# Patient Record
Sex: MALE | Race: WHITE | NOT HISPANIC OR LATINO | URBAN - METROPOLITAN AREA
[De-identification: names, ages, dates, MRNs, and addresses within clinical notes are randomized per-mention and may not be internally consistent; named-entity substitution may affect disease eponyms.]

---

## 2018-01-17 PROBLEM — Z00.00 ENCOUNTER FOR PREVENTIVE HEALTH EXAMINATION: Status: ACTIVE | Noted: 2018-01-17

## 2018-09-20 VITALS
OXYGEN SATURATION: 95 % | WEIGHT: 199.96 LBS | TEMPERATURE: 97 F | HEIGHT: 66 IN | DIASTOLIC BLOOD PRESSURE: 67 MMHG | HEART RATE: 61 BPM | SYSTOLIC BLOOD PRESSURE: 113 MMHG | RESPIRATION RATE: 18 BRPM

## 2018-09-20 NOTE — PATIENT PROFILE ADULT. - PMH
CAD (coronary artery disease)    Chronic bilateral low back pain with bilateral sciatica    Claudication    HLD (hyperlipidemia)    HTN (hypertension) CAD (coronary artery disease)  coronory stents x 2  Chronic bilateral low back pain with bilateral sciatica    Claudication    HLD (hyperlipidemia)    HTN (hypertension)

## 2018-09-20 NOTE — PATIENT PROFILE ADULT. - PSH
History of partial thyroidectomy    History of percutaneous transluminal coronary angioplasty History of appendectomy    History of partial thyroidectomy    History of percutaneous transluminal coronary angioplasty

## 2018-09-21 ENCOUNTER — INPATIENT (INPATIENT)
Facility: HOSPITAL | Age: 83
LOS: 2 days | Discharge: HOME HEALTH | DRG: 460 | End: 2018-09-24
Attending: NEUROLOGICAL SURGERY | Admitting: NEUROLOGICAL SURGERY
Payer: MEDICARE

## 2018-09-21 DIAGNOSIS — Z90.49 ACQUIRED ABSENCE OF OTHER SPECIFIED PARTS OF DIGESTIVE TRACT: Chronic | ICD-10-CM

## 2018-09-21 DIAGNOSIS — Z98.890 OTHER SPECIFIED POSTPROCEDURAL STATES: Chronic | ICD-10-CM

## 2018-09-21 DIAGNOSIS — I10 ESSENTIAL (PRIMARY) HYPERTENSION: ICD-10-CM

## 2018-09-21 DIAGNOSIS — E78.00 PURE HYPERCHOLESTEROLEMIA, UNSPECIFIED: ICD-10-CM

## 2018-09-21 DIAGNOSIS — M48.062 SPINAL STENOSIS, LUMBAR REGION WITH NEUROGENIC CLAUDICATION: ICD-10-CM

## 2018-09-21 DIAGNOSIS — Z98.61 CORONARY ANGIOPLASTY STATUS: Chronic | ICD-10-CM

## 2018-09-21 DIAGNOSIS — I25.10 ATHEROSCLEROTIC HEART DISEASE OF NATIVE CORONARY ARTERY WITHOUT ANGINA PECTORIS: ICD-10-CM

## 2018-09-21 RX ORDER — HYDROMORPHONE HYDROCHLORIDE 2 MG/ML
0.3 INJECTION INTRAMUSCULAR; INTRAVENOUS; SUBCUTANEOUS
Qty: 0 | Refills: 0 | Status: DISCONTINUED | OUTPATIENT
Start: 2018-09-21 | End: 2018-09-24

## 2018-09-21 RX ORDER — CYCLOBENZAPRINE HYDROCHLORIDE 10 MG/1
1 TABLET, FILM COATED ORAL
Qty: 30 | Refills: 0 | OUTPATIENT
Start: 2018-09-21

## 2018-09-21 RX ORDER — ACETAMINOPHEN 500 MG
975 TABLET ORAL EVERY 6 HOURS
Qty: 0 | Refills: 0 | Status: DISCONTINUED | OUTPATIENT
Start: 2018-09-21 | End: 2018-09-24

## 2018-09-21 RX ORDER — CYCLOBENZAPRINE HYDROCHLORIDE 10 MG/1
10 TABLET, FILM COATED ORAL THREE TIMES A DAY
Qty: 0 | Refills: 0 | Status: DISCONTINUED | OUTPATIENT
Start: 2018-09-21 | End: 2018-09-24

## 2018-09-21 RX ORDER — OXYCODONE HYDROCHLORIDE 5 MG/1
10 TABLET ORAL EVERY 4 HOURS
Qty: 0 | Refills: 0 | Status: DISCONTINUED | OUTPATIENT
Start: 2018-09-21 | End: 2018-09-24

## 2018-09-21 RX ORDER — OXYCODONE HYDROCHLORIDE 5 MG/1
5 TABLET ORAL EVERY 4 HOURS
Qty: 0 | Refills: 0 | Status: DISCONTINUED | OUTPATIENT
Start: 2018-09-21 | End: 2018-09-24

## 2018-09-21 RX ORDER — HYDROMORPHONE HYDROCHLORIDE 2 MG/ML
0.5 INJECTION INTRAMUSCULAR; INTRAVENOUS; SUBCUTANEOUS EVERY 4 HOURS
Qty: 0 | Refills: 0 | Status: DISCONTINUED | OUTPATIENT
Start: 2018-09-21 | End: 2018-09-24

## 2018-09-21 RX ORDER — SENNA PLUS 8.6 MG/1
2 TABLET ORAL AT BEDTIME
Qty: 0 | Refills: 0 | Status: DISCONTINUED | OUTPATIENT
Start: 2018-09-21 | End: 2018-09-24

## 2018-09-21 RX ORDER — SODIUM CHLORIDE 9 MG/ML
1000 INJECTION INTRAMUSCULAR; INTRAVENOUS; SUBCUTANEOUS
Qty: 0 | Refills: 0 | Status: DISCONTINUED | OUTPATIENT
Start: 2018-09-21 | End: 2018-09-21

## 2018-09-21 RX ORDER — ONDANSETRON 8 MG/1
4 TABLET, FILM COATED ORAL EVERY 6 HOURS
Qty: 0 | Refills: 0 | Status: DISCONTINUED | OUTPATIENT
Start: 2018-09-21 | End: 2018-09-24

## 2018-09-21 RX ORDER — DOCUSATE SODIUM 100 MG
1 CAPSULE ORAL
Qty: 40 | Refills: 0 | OUTPATIENT
Start: 2018-09-21

## 2018-09-21 RX ORDER — FAMOTIDINE 10 MG/ML
20 INJECTION INTRAVENOUS DAILY
Qty: 0 | Refills: 0 | Status: DISCONTINUED | OUTPATIENT
Start: 2018-09-21 | End: 2018-09-24

## 2018-09-21 RX ORDER — FAMOTIDINE 10 MG/ML
20 INJECTION INTRAVENOUS
Qty: 0 | Refills: 0 | Status: DISCONTINUED | OUTPATIENT
Start: 2018-09-21 | End: 2018-09-21

## 2018-09-21 RX ORDER — INFLUENZA VIRUS VACCINE 15; 15; 15; 15 UG/.5ML; UG/.5ML; UG/.5ML; UG/.5ML
0.5 SUSPENSION INTRAMUSCULAR ONCE
Qty: 0 | Refills: 0 | Status: COMPLETED | OUTPATIENT
Start: 2018-09-21 | End: 2018-09-24

## 2018-09-21 RX ORDER — CEFAZOLIN SODIUM 1 G
1000 VIAL (EA) INJECTION EVERY 8 HOURS
Qty: 0 | Refills: 0 | Status: COMPLETED | OUTPATIENT
Start: 2018-09-21 | End: 2018-09-22

## 2018-09-21 RX ORDER — LOSARTAN POTASSIUM 100 MG/1
50 TABLET, FILM COATED ORAL DAILY
Qty: 0 | Refills: 0 | Status: DISCONTINUED | OUTPATIENT
Start: 2018-09-21 | End: 2018-09-21

## 2018-09-21 RX ADMIN — Medication 100 MILLIGRAM(S): at 15:40

## 2018-09-21 RX ADMIN — HYDROMORPHONE HYDROCHLORIDE 0.3 MILLIGRAM(S): 2 INJECTION INTRAMUSCULAR; INTRAVENOUS; SUBCUTANEOUS at 13:09

## 2018-09-21 RX ADMIN — Medication 975 MILLIGRAM(S): at 22:12

## 2018-09-21 RX ADMIN — Medication 975 MILLIGRAM(S): at 17:52

## 2018-09-21 RX ADMIN — Medication 975 MILLIGRAM(S): at 21:08

## 2018-09-21 RX ADMIN — Medication 975 MILLIGRAM(S): at 15:44

## 2018-09-21 RX ADMIN — Medication 100 MILLIGRAM(S): at 21:18

## 2018-09-21 RX ADMIN — HYDROMORPHONE HYDROCHLORIDE 0.3 MILLIGRAM(S): 2 INJECTION INTRAMUSCULAR; INTRAVENOUS; SUBCUTANEOUS at 13:31

## 2018-09-21 RX ADMIN — SODIUM CHLORIDE 75 MILLILITER(S): 9 INJECTION INTRAMUSCULAR; INTRAVENOUS; SUBCUTANEOUS at 16:18

## 2018-09-21 NOTE — H&P ADULT - PMH
CAD (coronary artery disease)  coronory stents x 2  Chronic bilateral low back pain with bilateral sciatica    Claudication    HLD (hyperlipidemia)    HTN (hypertension)

## 2018-09-21 NOTE — BRIEF OPERATIVE NOTE - PRE-OP DX
Lumbar spondylosis  09/21/2018    Active  Lloyd Miranda  Lumbar stenosis with neurogenic claudication  09/21/2018  Right L2-3, L3-4  Active  Lloyd Miranda

## 2018-09-21 NOTE — H&P ADULT - HISTORY OF PRESENT ILLNESS
This is a 84 y/o male with PMHx of CAD ( on ASA)  HLD and HTN who presents with progressive LBP and radiculopathy.   The symptoms started suddenly without history of trauma. There is subjective________  He has failed conservative measures and his daily life has been affected due to the symptoms.   He denies urinary or bowel incontinence. Imaging revealed ______ and after discussion presents for surgical intervention. This is a LEFT HANDED/ FOOTED 82 y/o male with PMHx of CAD ( on ASA)  HLD and HTN who presents with progressive LBP and RLE radiculopathy.   The symptoms started suddenly without history of trauma. There is subjective weakness of his right foot and the p-ain goes from the back down the buttock to the posterior thigh ending at the knee. Though he does not need assistive ambulation devices these symptoms are exacerbated with walking more than a block and limit the distance he can do.   He has failed conservative measures and his daily life has been affected due to the symptoms.   He denies urinary or bowel incontinence. Imaging revealed neuroforaminal stenosis and HNP  and after discussion presents for surgical intervention.   (+) ASA use and stopped 1 week ago.

## 2018-09-21 NOTE — PROGRESS NOTE ADULT - SUBJECTIVE AND OBJECTIVE BOX
NEUROSURGERY POST OP NOTE:    POD# 0 S/P right L2-4 lami    S: Seen/evaluated at bedside.  Doing well, pain controlled.  No acute events.  No other complaints      T(C): 35.9 (09-21-18 @ 10:23), Max: 35.9 (09-21-18 @ 10:23)  HR: 76 (09-21-18 @ 12:30) (72 - 76)  BP: 115/63 (09-21-18 @ 12:30) (111/80 - 124/69)  RR: 19 (09-21-18 @ 12:30) (11 - 23)  SpO2: 95% (09-21-18 @ 12:30) (94% - 100%)      09-21-18 @ 07:01  -  09-21-18 @ 13:42  --------------------------------------------------------  IN: 250 mL / OUT: 150 mL / NET: 100 mL        acetaminophen   Tablet .. 975 milliGRAM(s) Oral every 6 hours  ceFAZolin   IVPB 1000 milliGRAM(s) IV Intermittent every 8 hours  cyclobenzaprine 10 milliGRAM(s) Oral three times a day PRN  famotidine    Tablet 20 milliGRAM(s) Oral daily  HYDROmorphone  Injectable 0.3 milliGRAM(s) IV Push every 30 minutes PRN  HYDROmorphone  Injectable 0.5 milliGRAM(s) IV Push every 4 hours PRN  influenza   Vaccine 0.5 milliLiter(s) IntraMuscular once  ondansetron Injectable 4 milliGRAM(s) IV Push every 6 hours PRN  oxyCODONE    IR 5 milliGRAM(s) Oral every 4 hours PRN  oxyCODONE    IR 10 milliGRAM(s) Oral every 4 hours PRN  senna 2 Tablet(s) Oral at bedtime  sodium chloride 0.9%. 1000 milliLiter(s) IV Continuous <Continuous>      Exam: A/Ox3, follows commands, speech clear  RODRIGUEZ 5/5 strength  Sensation intact light touch all dermatomes    WOUND/DRAINS: abdominal binder in place, +J      Assessment: 83yMale s/p right L2-4 lami      Plan:  -Pain control  -PT/OOB  -Advance diet  -D/C anita in AM  -DVT/GI ppx  -D/W Dr. schmitt NEUROSURGERY POST OP NOTE:    POD# 0 S/P right L2-4 lami    S: Seen/evaluated at bedside.  Doing well, pain controlled.  No acute events.  No other complaints      T(C): 35.9 (09-21-18 @ 10:23), Max: 35.9 (09-21-18 @ 10:23)  HR: 76 (09-21-18 @ 12:30) (72 - 76)  BP: 115/63 (09-21-18 @ 12:30) (111/80 - 124/69)  RR: 19 (09-21-18 @ 12:30) (11 - 23)  SpO2: 95% (09-21-18 @ 12:30) (94% - 100%)      09-21-18 @ 07:01  -  09-21-18 @ 13:42  --------------------------------------------------------  IN: 250 mL / OUT: 150 mL / NET: 100 mL        acetaminophen   Tablet .. 975 milliGRAM(s) Oral every 6 hours  ceFAZolin   IVPB 1000 milliGRAM(s) IV Intermittent every 8 hours  cyclobenzaprine 10 milliGRAM(s) Oral three times a day PRN  famotidine    Tablet 20 milliGRAM(s) Oral daily  HYDROmorphone  Injectable 0.3 milliGRAM(s) IV Push every 30 minutes PRN  HYDROmorphone  Injectable 0.5 milliGRAM(s) IV Push every 4 hours PRN  influenza   Vaccine 0.5 milliLiter(s) IntraMuscular once  ondansetron Injectable 4 milliGRAM(s) IV Push every 6 hours PRN  oxyCODONE    IR 5 milliGRAM(s) Oral every 4 hours PRN  oxyCODONE    IR 10 milliGRAM(s) Oral every 4 hours PRN  senna 2 Tablet(s) Oral at bedtime  sodium chloride 0.9%. 1000 milliLiter(s) IV Continuous <Continuous>      Exam: A/Ox3, follows commands, speech clear  RLE 4/5 strength, LLE 5/5 strength  Sensation intact light touch all dermatomes    WOUND/DRAINS: abdominal binder in place, +J      Assessment: 83yMale s/p right L2-4 lami      Plan:  -Pain control  -PT/OOB  -Advance diet  -D/C anita in AM  -DVT/GI ppx  -D/W Dr. schmitt NEUROSURGERY POST OP NOTE:    POD# 0 S/P right L2-4 lami    S: Seen/evaluated at bedside.  Doing well, pain controlled.  No acute events.  No other complaints      T(C): 35.9 (09-21-18 @ 10:23), Max: 35.9 (09-21-18 @ 10:23)  HR: 76 (09-21-18 @ 12:30) (72 - 76)  BP: 115/63 (09-21-18 @ 12:30) (111/80 - 124/69)  RR: 19 (09-21-18 @ 12:30) (11 - 23)  SpO2: 95% (09-21-18 @ 12:30) (94% - 100%)      09-21-18 @ 07:01  -  09-21-18 @ 13:42  --------------------------------------------------------  IN: 250 mL / OUT: 150 mL / NET: 100 mL        acetaminophen   Tablet .. 975 milliGRAM(s) Oral every 6 hours  ceFAZolin   IVPB 1000 milliGRAM(s) IV Intermittent every 8 hours  cyclobenzaprine 10 milliGRAM(s) Oral three times a day PRN  famotidine    Tablet 20 milliGRAM(s) Oral daily  HYDROmorphone  Injectable 0.3 milliGRAM(s) IV Push every 30 minutes PRN  HYDROmorphone  Injectable 0.5 milliGRAM(s) IV Push every 4 hours PRN  influenza   Vaccine 0.5 milliLiter(s) IntraMuscular once  ondansetron Injectable 4 milliGRAM(s) IV Push every 6 hours PRN  oxyCODONE    IR 5 milliGRAM(s) Oral every 4 hours PRN  oxyCODONE    IR 10 milliGRAM(s) Oral every 4 hours PRN  senna 2 Tablet(s) Oral at bedtime  sodium chloride 0.9%. 1000 milliLiter(s) IV Continuous <Continuous>      Exam: A/Ox3, follows commands, speech clear  RLE 4/5 strength, LLE 5/5 strength  Sensation intact light touch all dermatomes    WOUND/DRAINS: abdominal binder in place, +KEITH, dressing c/d/i       Assessment: 83y Male s/p right L2-4 lami POD#0      Plan:  -Pain control  -PT/OOB  -Advance diet  -D/C anita in AM  -DVT/GI ppx  -D/W Dr. schmitt

## 2018-09-21 NOTE — H&P ADULT - NSHPPHYSICALEXAM_GEN_ALL_CORE
A&Ox3 sitting in chair  speech clear   CN intact   RODRIGUEZ   LE Motor : right quad , HS and IS 4/5, rest is 5/5 throughout   sensation intact to LT BL   positive SLR on right

## 2018-09-21 NOTE — BRIEF OPERATIVE NOTE - PROCEDURE
<<-----Click on this checkbox to enter Procedure Lumbar laminectomy at multiple levels  09/21/2018  Right L2-3, L3-4,  Active  [email or web address]

## 2018-09-21 NOTE — H&P ADULT - ASSESSMENT
Lumbar HNP Lumbar HNP causing stenosis   Images reviewed with patient.   explained risks , benefits and alternatives to procedure.   Risks such as infection, CSF leak, hemorrhage, weakness, sensory changes, failure of surgery and/or need for further surgery explained.   Patient expressed understanding and wishes to proceed with procedure.

## 2018-09-21 NOTE — H&P ADULT - PSH
History of appendectomy    History of partial thyroidectomy    History of percutaneous transluminal coronary angioplasty

## 2018-09-21 NOTE — BRIEF OPERATIVE NOTE - POST-OP DX
Ligamentum flavum hypertrophy  09/21/2018    Active  Lloyd Miranda  Lumbar stenosis with neurogenic claudication  09/21/2018    Active  Lloyd Miranda

## 2018-09-22 LAB
ANION GAP SERPL CALC-SCNC: 15 MMOL/L — SIGNIFICANT CHANGE UP (ref 5–17)
BUN SERPL-MCNC: 26 MG/DL — HIGH (ref 7–23)
CALCIUM SERPL-MCNC: 9.1 MG/DL — SIGNIFICANT CHANGE UP (ref 8.4–10.5)
CHLORIDE SERPL-SCNC: 101 MMOL/L — SIGNIFICANT CHANGE UP (ref 96–108)
CO2 SERPL-SCNC: 22 MMOL/L — SIGNIFICANT CHANGE UP (ref 22–31)
CREAT SERPL-MCNC: 1.12 MG/DL — SIGNIFICANT CHANGE UP (ref 0.5–1.3)
GLUCOSE SERPL-MCNC: 123 MG/DL — HIGH (ref 70–99)
HCT VFR BLD CALC: 44 % — SIGNIFICANT CHANGE UP (ref 39–50)
HGB BLD-MCNC: 15 G/DL — SIGNIFICANT CHANGE UP (ref 13–17)
MCHC RBC-ENTMCNC: 31.7 PG — SIGNIFICANT CHANGE UP (ref 27–34)
MCHC RBC-ENTMCNC: 34.1 G/DL — SIGNIFICANT CHANGE UP (ref 32–36)
MCV RBC AUTO: 93 FL — SIGNIFICANT CHANGE UP (ref 80–100)
PLATELET # BLD AUTO: 192 K/UL — SIGNIFICANT CHANGE UP (ref 150–400)
POTASSIUM SERPL-MCNC: 5.1 MMOL/L — SIGNIFICANT CHANGE UP (ref 3.5–5.3)
POTASSIUM SERPL-SCNC: 5.1 MMOL/L — SIGNIFICANT CHANGE UP (ref 3.5–5.3)
RBC # BLD: 4.73 M/UL — SIGNIFICANT CHANGE UP (ref 4.2–5.8)
RBC # FLD: 13.5 % — SIGNIFICANT CHANGE UP (ref 10.3–16.9)
SODIUM SERPL-SCNC: 138 MMOL/L — SIGNIFICANT CHANGE UP (ref 135–145)
WBC # BLD: 16.3 K/UL — HIGH (ref 3.8–10.5)
WBC # FLD AUTO: 16.3 K/UL — HIGH (ref 3.8–10.5)

## 2018-09-22 RX ORDER — IPRATROPIUM/ALBUTEROL SULFATE 18-103MCG
3 AEROSOL WITH ADAPTER (GRAM) INHALATION EVERY 6 HOURS
Qty: 0 | Refills: 0 | Status: DISCONTINUED | OUTPATIENT
Start: 2018-09-22 | End: 2018-09-24

## 2018-09-22 RX ORDER — ENOXAPARIN SODIUM 100 MG/ML
40 INJECTION SUBCUTANEOUS AT BEDTIME
Qty: 0 | Refills: 0 | Status: DISCONTINUED | OUTPATIENT
Start: 2018-09-22 | End: 2018-09-24

## 2018-09-22 RX ORDER — TIOTROPIUM BROMIDE 18 UG/1
1 CAPSULE ORAL; RESPIRATORY (INHALATION) DAILY
Qty: 0 | Refills: 0 | Status: DISCONTINUED | OUTPATIENT
Start: 2018-09-22 | End: 2018-09-24

## 2018-09-22 RX ORDER — BENZOCAINE AND MENTHOL 5; 1 G/100ML; G/100ML
1 LIQUID ORAL
Qty: 0 | Refills: 0 | Status: DISCONTINUED | OUTPATIENT
Start: 2018-09-22 | End: 2018-09-24

## 2018-09-22 RX ORDER — ALBUTEROL 90 UG/1
1 AEROSOL, METERED ORAL EVERY 4 HOURS
Qty: 0 | Refills: 0 | Status: DISCONTINUED | OUTPATIENT
Start: 2018-09-22 | End: 2018-09-24

## 2018-09-22 RX ADMIN — Medication 100 MILLIGRAM(S): at 15:41

## 2018-09-22 RX ADMIN — Medication 975 MILLIGRAM(S): at 05:36

## 2018-09-22 RX ADMIN — BENZOCAINE AND MENTHOL 1 LOZENGE: 5; 1 LIQUID ORAL at 16:23

## 2018-09-22 RX ADMIN — Medication 975 MILLIGRAM(S): at 11:52

## 2018-09-22 RX ADMIN — Medication 975 MILLIGRAM(S): at 18:00

## 2018-09-22 RX ADMIN — Medication 100 MILLIGRAM(S): at 05:36

## 2018-09-22 RX ADMIN — Medication 975 MILLIGRAM(S): at 22:21

## 2018-09-22 RX ADMIN — Medication 975 MILLIGRAM(S): at 06:45

## 2018-09-22 RX ADMIN — Medication 3 MILLILITER(S): at 22:25

## 2018-09-22 RX ADMIN — Medication 975 MILLIGRAM(S): at 17:51

## 2018-09-22 RX ADMIN — Medication 975 MILLIGRAM(S): at 12:52

## 2018-09-22 RX ADMIN — ENOXAPARIN SODIUM 40 MILLIGRAM(S): 100 INJECTION SUBCUTANEOUS at 22:20

## 2018-09-22 RX ADMIN — Medication 975 MILLIGRAM(S): at 23:30

## 2018-09-22 RX ADMIN — SENNA PLUS 2 TABLET(S): 8.6 TABLET ORAL at 22:21

## 2018-09-22 NOTE — PHYSICAL THERAPY INITIAL EVALUATION ADULT - ADDITIONAL COMMENTS
Pt states in past month had difficulty amb and required spouse and son to assist him though did not use AD. Pt lives at home with spouse with 3 YEE, denies elevator access.

## 2018-09-22 NOTE — PROGRESS NOTE ADULT - SUBJECTIVE AND OBJECTIVE BOX
HPI:  This is a LEFT HANDED/ FOOTED 82 y/o male with PMHx of CAD ( on ASA)  HLD and HTN who presents with progressive LBP and RLE radiculopathy.   The symptoms started suddenly without history of trauma. There is subjective weakness of his right foot and the p-ain goes from the back down the buttock to the posterior thigh ending at the knee. Though he does not need assistive ambulation devices these symptoms are exacerbated with walking more than a block and limit the distance he can do.   He has failed conservative measures and his daily life has been affected due to the symptoms.   He denies urinary or bowel incontinence. Imaging revealed neuroforaminal stenosis and HNP  and after discussion presents for surgical intervention.   (+) ASA use and stopped 1 week ago. (21 Sep 2018 06:43)      Hospital course:   9/21: POD#0 S/P right L2-4 lami, passed TOV   9/22: POD#1, PT eval recommending home w/ home PT, KEITH with clot in tubing overnight so o/p only 3cc, but once stripped, o/p 80cc/12 hours, will keep in until tomorrow      Vital Signs Last 24 Hrs  T(C): 36.3 (22 Sep 2018 09:43), Max: 36.8 (22 Sep 2018 05:05)  T(F): 97.4 (22 Sep 2018 09:43), Max: 98.2 (22 Sep 2018 05:05)  HR: 83 (22 Sep 2018 09:43) (78 - 103)  BP: 109/70 (22 Sep 2018 09:43) (96/65 - 110/65)  BP(mean): --  RR: 18 (22 Sep 2018 09:43) (16 - 18)  SpO2: 98% (22 Sep 2018 09:43) (92% - 98%)    I&O's Summary    21 Sep 2018 07:01  -  22 Sep 2018 07:00  --------------------------------------------------------  IN: 700 mL / OUT: 1548 mL / NET: -848 mL    22 Sep 2018 07:01  -  22 Sep 2018 16:37  --------------------------------------------------------  IN: 0 mL / OUT: 330 mL / NET: -330 mL        PHYSICAL EXAM:  A/Ox3, follows commands, speech clear  5/5 strength throughout   Sensation intact light touch all dermatomes    WOUND/DRAINS: abdominal binder in place, +KEITH     TUBES/LINES:  [] Rousseau  [] Lumbar Drain  [x] Wound Drains  [] NGT   [] EVD   [] CVC  [] Other      DIET:  [] NPO  [x] Mechanical  [] Tube feeds    LABS:                        15.0   16.3  )-----------( 192      ( 22 Sep 2018 07:54 )             44.0     09-22    138  |  101  |  26<H>  ----------------------------<  123<H>  5.1   |  22  |  1.12    Ca    9.1      22 Sep 2018 07:54              CAPILLARY BLOOD GLUCOSE          Drug Levels: [] N/A    CSF Analysis: [] N/A      Allergies    No Known Allergies    Intolerances        Home Medications:  Aspir 81 oral delayed release tablet: 1 tab(s) orally once a day (21 Sep 2018 06:16)  Benicar 20 mg oral tablet: 1 tab(s) orally once a day (21 Sep 2018 06:16)  Lipitor 20 mg oral tablet: 1 tab(s) orally once a day (21 Sep 2018 06:16)      MEDICATIONS:  MEDICATIONS  (STANDING):  acetaminophen   Tablet .. 975 milliGRAM(s) Oral every 6 hours  famotidine    Tablet 20 milliGRAM(s) Oral daily  influenza   Vaccine 0.5 milliLiter(s) IntraMuscular once  senna 2 Tablet(s) Oral at bedtime    MEDICATIONS  (PRN):  benzocaine 15 mG/menthol 3.6 mG Lozenge 1 Lozenge Oral every 2 hours PRN Sore Throat  cyclobenzaprine 10 milliGRAM(s) Oral three times a day PRN Muscle Spasm  HYDROmorphone  Injectable 0.3 milliGRAM(s) IV Push every 30 minutes PRN Severe Pain (7 - 10)  HYDROmorphone  Injectable 0.5 milliGRAM(s) IV Push every 4 hours PRN breakthrough pain  ondansetron Injectable 4 milliGRAM(s) IV Push every 6 hours PRN Nausea and/or Vomiting  oxyCODONE    IR 5 milliGRAM(s) Oral every 4 hours PRN Moderate Pain (4 - 6)  oxyCODONE    IR 10 milliGRAM(s) Oral every 4 hours PRN Severe Pain (7 - 10)      CULTURES:      RADIOLOGY & ADDITIONAL TESTS:      ASSESSMENT:  83yMale s/p right L2-4 lami POD#1    PLAN:  -Pain control  -PT/OOB  -Continue KEITH, monitor output and strip drains frequently   -Reg diet   - Bowel regimen   -GI ppx   -Start SQL   -D/W Dr. schmitt    DVT PROPHYLAXIS:  [x] Venodynes                                [x] Lovenox    DISPOSITION:  - Floor status   - Full code   - Dispo: home w/ home PT   - D/w Dr. Schmitt HPI:  This is a LEFT HANDED/ FOOTED 82 y/o male with PMHx of CAD ( on ASA)  HLD and HTN who presents with progressive LBP and RLE radiculopathy.   The symptoms started suddenly without history of trauma. There is subjective weakness of his right foot and the p-ain goes from the back down the buttock to the posterior thigh ending at the knee. Though he does not need assistive ambulation devices these symptoms are exacerbated with walking more than a block and limit the distance he can do.   He has failed conservative measures and his daily life has been affected due to the symptoms.   He denies urinary or bowel incontinence. Imaging revealed neuroforaminal stenosis and HNP  and after discussion presents for surgical intervention.   (+) ASA use and stopped 1 week ago. (21 Sep 2018 06:43)      Hospital course:   9/21: POD#0 S/P right L2-4 lami, passed TOV   9/22: POD#1, PT eval recommending home w/ home PT, KEITH with clot in tubing overnight so o/p only 3cc, but once stripped, o/p 80cc/12 hours, will keep in until tomorrow      Vital Signs Last 24 Hrs  T(C): 36.3 (22 Sep 2018 09:43), Max: 36.8 (22 Sep 2018 05:05)  T(F): 97.4 (22 Sep 2018 09:43), Max: 98.2 (22 Sep 2018 05:05)  HR: 83 (22 Sep 2018 09:43) (78 - 103)  BP: 109/70 (22 Sep 2018 09:43) (96/65 - 110/65)  BP(mean): --  RR: 18 (22 Sep 2018 09:43) (16 - 18)  SpO2: 98% (22 Sep 2018 09:43) (92% - 98%)    I&O's Summary    21 Sep 2018 07:01  -  22 Sep 2018 07:00  --------------------------------------------------------  IN: 700 mL / OUT: 1548 mL / NET: -848 mL    22 Sep 2018 07:01  -  22 Sep 2018 16:37  --------------------------------------------------------  IN: 0 mL / OUT: 330 mL / NET: -330 mL        PHYSICAL EXAM:  A/Ox3, follows commands, speech clear  5/5 strength throughout   Sensation intact light touch all dermatomes    WOUND/DRAINS: abdominal binder in place, +KEITH, dressing changed 2/2 blood staining     TUBES/LINES:  [] Rousseau  [] Lumbar Drain  [x] Wound Drains  [] NGT   [] EVD   [] CVC  [] Other      DIET:  [] NPO  [x] Mechanical  [] Tube feeds    LABS:                        15.0   16.3  )-----------( 192      ( 22 Sep 2018 07:54 )             44.0     09-22    138  |  101  |  26<H>  ----------------------------<  123<H>  5.1   |  22  |  1.12    Ca    9.1      22 Sep 2018 07:54              CAPILLARY BLOOD GLUCOSE          Drug Levels: [] N/A    CSF Analysis: [] N/A      Allergies    No Known Allergies    Intolerances        Home Medications:  Aspir 81 oral delayed release tablet: 1 tab(s) orally once a day (21 Sep 2018 06:16)  Benicar 20 mg oral tablet: 1 tab(s) orally once a day (21 Sep 2018 06:16)  Lipitor 20 mg oral tablet: 1 tab(s) orally once a day (21 Sep 2018 06:16)      MEDICATIONS:  MEDICATIONS  (STANDING):  acetaminophen   Tablet .. 975 milliGRAM(s) Oral every 6 hours  famotidine    Tablet 20 milliGRAM(s) Oral daily  influenza   Vaccine 0.5 milliLiter(s) IntraMuscular once  senna 2 Tablet(s) Oral at bedtime    MEDICATIONS  (PRN):  benzocaine 15 mG/menthol 3.6 mG Lozenge 1 Lozenge Oral every 2 hours PRN Sore Throat  cyclobenzaprine 10 milliGRAM(s) Oral three times a day PRN Muscle Spasm  HYDROmorphone  Injectable 0.3 milliGRAM(s) IV Push every 30 minutes PRN Severe Pain (7 - 10)  HYDROmorphone  Injectable 0.5 milliGRAM(s) IV Push every 4 hours PRN breakthrough pain  ondansetron Injectable 4 milliGRAM(s) IV Push every 6 hours PRN Nausea and/or Vomiting  oxyCODONE    IR 5 milliGRAM(s) Oral every 4 hours PRN Moderate Pain (4 - 6)  oxyCODONE    IR 10 milliGRAM(s) Oral every 4 hours PRN Severe Pain (7 - 10)      CULTURES:      RADIOLOGY & ADDITIONAL TESTS:      ASSESSMENT:  83y Male s/p right L2-4 lami POD#1    PLAN:  -Pain control  -PT/OOB  -Continue KEITH, monitor output and strip drains frequently   -Reg diet   -Bowel regimen   -GI ppx   -Start SQL   -D/W Dr. schmitt    DVT PROPHYLAXIS:  [x] Venodynes                                [x] Lovenox    DISPOSITION:  - Floor status   - Full code   - Dispo: home w/ home PT   - D/w Dr. Schmitt

## 2018-09-22 NOTE — PHYSICAL THERAPY INITIAL EVALUATION ADULT - GENERAL OBSERVATIONS, REHAB EVAL
Rec'd pt supine in bed, non-acute distress, +KEITH x 1, cleared for PT and OOB activity by Rn (dylan). c/o incisional pain back 2/10 Rn dylan aware.

## 2018-09-23 LAB
ANION GAP SERPL CALC-SCNC: 9 MMOL/L — SIGNIFICANT CHANGE UP (ref 5–17)
BUN SERPL-MCNC: 29 MG/DL — HIGH (ref 7–23)
CALCIUM SERPL-MCNC: 9.3 MG/DL — SIGNIFICANT CHANGE UP (ref 8.4–10.5)
CHLORIDE SERPL-SCNC: 100 MMOL/L — SIGNIFICANT CHANGE UP (ref 96–108)
CO2 SERPL-SCNC: 29 MMOL/L — SIGNIFICANT CHANGE UP (ref 22–31)
CREAT SERPL-MCNC: 1.14 MG/DL — SIGNIFICANT CHANGE UP (ref 0.5–1.3)
GLUCOSE SERPL-MCNC: 98 MG/DL — SIGNIFICANT CHANGE UP (ref 70–99)
HCT VFR BLD CALC: 44.4 % — SIGNIFICANT CHANGE UP (ref 39–50)
HGB BLD-MCNC: 14.9 G/DL — SIGNIFICANT CHANGE UP (ref 13–17)
MAGNESIUM SERPL-MCNC: 2.2 MG/DL — SIGNIFICANT CHANGE UP (ref 1.6–2.6)
MCHC RBC-ENTMCNC: 31.2 PG — SIGNIFICANT CHANGE UP (ref 27–34)
MCHC RBC-ENTMCNC: 33.6 G/DL — SIGNIFICANT CHANGE UP (ref 32–36)
MCV RBC AUTO: 93.1 FL — SIGNIFICANT CHANGE UP (ref 80–100)
PHOSPHATE SERPL-MCNC: 3.5 MG/DL — SIGNIFICANT CHANGE UP (ref 2.5–4.5)
PLATELET # BLD AUTO: 172 K/UL — SIGNIFICANT CHANGE UP (ref 150–400)
POTASSIUM SERPL-MCNC: 5 MMOL/L — SIGNIFICANT CHANGE UP (ref 3.5–5.3)
POTASSIUM SERPL-SCNC: 5 MMOL/L — SIGNIFICANT CHANGE UP (ref 3.5–5.3)
RBC # BLD: 4.77 M/UL — SIGNIFICANT CHANGE UP (ref 4.2–5.8)
RBC # FLD: 13.7 % — SIGNIFICANT CHANGE UP (ref 10.3–16.9)
SODIUM SERPL-SCNC: 138 MMOL/L — SIGNIFICANT CHANGE UP (ref 135–145)
WBC # BLD: 11.9 K/UL — HIGH (ref 3.8–10.5)
WBC # FLD AUTO: 11.9 K/UL — HIGH (ref 3.8–10.5)

## 2018-09-23 PROCEDURE — 71045 X-RAY EXAM CHEST 1 VIEW: CPT | Mod: 26

## 2018-09-23 RX ADMIN — Medication 975 MILLIGRAM(S): at 17:24

## 2018-09-23 RX ADMIN — Medication 3 MILLILITER(S): at 21:26

## 2018-09-23 RX ADMIN — Medication 975 MILLIGRAM(S): at 21:27

## 2018-09-23 RX ADMIN — SENNA PLUS 2 TABLET(S): 8.6 TABLET ORAL at 16:30

## 2018-09-23 RX ADMIN — Medication 975 MILLIGRAM(S): at 16:26

## 2018-09-23 RX ADMIN — ENOXAPARIN SODIUM 40 MILLIGRAM(S): 100 INJECTION SUBCUTANEOUS at 21:25

## 2018-09-23 RX ADMIN — Medication 3 MILLILITER(S): at 08:25

## 2018-09-23 RX ADMIN — Medication 975 MILLIGRAM(S): at 22:15

## 2018-09-23 NOTE — OCCUPATIONAL THERAPY INITIAL EVALUATION ADULT - PLANNED THERAPY INTERVENTIONS, OT EVAL
ADL retraining/IADL retraining/balance training/bed mobility training/transfer training/strengthening

## 2018-09-23 NOTE — OCCUPATIONAL THERAPY INITIAL EVALUATION ADULT - ADDITIONAL COMMENTS
Per patient he was independent with ADL and ambulation until a couple of weeks PTA when he started requiring assist from family for all ADL and ambulation 2/2 RLE weakness and loss of sensation.

## 2018-09-23 NOTE — PROGRESS NOTE ADULT - SUBJECTIVE AND OBJECTIVE BOX
Subjective:   HAL o/n    Hospital course:   9/21: POD#0 S/P right L2-4 lami, passed TOV   9/22: POD#1, PT eval recommending home w/ home PT, KEITH with clot in tubing overnight so o/p only 3cc, but once stripped, o/p 80cc/12 hours, will keep in until tomorrow    9/23: KEITH removed, pain controlled, progress with PT, dc planning    T(C): 36.6 (09-23-18 @ 04:35), Max: 36.8 (09-22-18 @ 05:05)  HR: 59 (09-23-18 @ 04:35) (59 - 103)  BP: 110/77 (09-23-18 @ 04:35) (96/54 - 110/77)  RR: 18 (09-23-18 @ 04:35) (17 - 18)  SpO2: 97% (09-23-18 @ 04:35) (95% - 98%)  Wt(kg): --    Exam:  A/Ox3, follows commands, speech clear  5/5 strength throughout   Sensation intact light touch all dermatomes    WOUND/DRAINS: abdominal binder in place, +KEITH intact      CBC Full  -  ( 22 Sep 2018 07:54 )  WBC Count : 16.3 K/uL  Hemoglobin : 15.0 g/dL  Hematocrit : 44.0 %  Platelet Count - Automated : 192 K/uL  Mean Cell Volume : 93.0 fL  Mean Cell Hemoglobin : 31.7 pg  Mean Cell Hemoglobin Concentration : 34.1 g/dL  Auto Neutrophil # : x  Auto Lymphocyte # : x  Auto Monocyte # : x  Auto Eosinophil # : x  Auto Basophil # : x  Auto Neutrophil % : x  Auto Lymphocyte % : x  Auto Monocyte % : x  Auto Eosinophil % : x  Auto Basophil % : x    09-22    138  |  101  |  26<H>  ----------------------------<  123<H>  5.1   |  22  |  1.12    Ca    9.1      22 Sep 2018 07:54      Assessment/Plan:  83y Male s/p right L2-4 lami POD#2    PLAN:  -Pain control  -PT/OOB  -dc KEITH  -Reg diet   -Bowel regimen   -GI ppx   -SQL   -D/W Dr. schmitt    DVT PROPHYLAXIS:  [x] Venodynes                                [x] Lovenox    DISPOSITION:  - Floor status   - Full code   - Dispo: home w/ home PT   - D/w Dr. Schmitt

## 2018-09-23 NOTE — OCCUPATIONAL THERAPY INITIAL EVALUATION ADULT - GENERAL OBSERVATIONS, REHAB EVAL
Left hand dominant. Chart reviewed, patient cleared for OT eval by DEVONTE Simon. Received semi-supine, NAD, +heplock, +tele, +KEITH, denies pain.

## 2018-09-23 NOTE — OCCUPATIONAL THERAPY INITIAL EVALUATION ADULT - MD ORDER
Per chart, 82 y/o male with PMHx of CAD ( on ASA)  HLD and HTN who presents with progressive LBP and RLE radiculopathy.   The symptoms started suddenly without history of trauma. There is subjective weakness of his right foot and the p-ain goes from the back down the buttock to the posterior thigh ending at the knee. Though he does not need assistive ambulation devices these symptoms are exacerbated with walking more than a block and limit the distance he can do.

## 2018-09-23 NOTE — OCCUPATIONAL THERAPY INITIAL EVALUATION ADULT - RANGE OF MOTION EXAMINATION, UPPER EXTREMITY
bilateral UE Active ROM was WFL  (within functional limits)/Bilateral shoulder flexion limited to approximately 100 degrees

## 2018-09-23 NOTE — OCCUPATIONAL THERAPY INITIAL EVALUATION ADULT - STANDING BALANCE: DYNAMIC, REHAB EVAL
Patient ambulated approximately 75 feet with CGA and RW, slow/steady gait +VC for breathing/fair minus

## 2018-09-24 ENCOUNTER — TRANSCRIPTION ENCOUNTER (OUTPATIENT)
Age: 83
End: 2018-09-24

## 2018-09-24 VITALS
SYSTOLIC BLOOD PRESSURE: 128 MMHG | OXYGEN SATURATION: 97 % | TEMPERATURE: 98 F | RESPIRATION RATE: 16 BRPM | DIASTOLIC BLOOD PRESSURE: 80 MMHG | HEART RATE: 84 BPM

## 2018-09-24 DIAGNOSIS — R06.00 DYSPNEA, UNSPECIFIED: ICD-10-CM

## 2018-09-24 DIAGNOSIS — J98.6 DISORDERS OF DIAPHRAGM: ICD-10-CM

## 2018-09-24 DIAGNOSIS — Z98.890 OTHER SPECIFIED POSTPROCEDURAL STATES: ICD-10-CM

## 2018-09-24 PROCEDURE — 99223 1ST HOSP IP/OBS HIGH 75: CPT | Mod: GC

## 2018-09-24 RX ORDER — SENNA PLUS 8.6 MG/1
2 TABLET ORAL
Qty: 14 | Refills: 0 | OUTPATIENT
Start: 2018-09-24 | End: 2018-09-30

## 2018-09-24 RX ORDER — CYCLOBENZAPRINE HYDROCHLORIDE 10 MG/1
1 TABLET, FILM COATED ORAL
Qty: 21 | Refills: 0 | OUTPATIENT
Start: 2018-09-24 | End: 2018-09-30

## 2018-09-24 RX ORDER — ALBUTEROL 90 UG/1
1 AEROSOL, METERED ORAL
Qty: 1 | Refills: 0 | OUTPATIENT
Start: 2018-09-24 | End: 2018-10-23

## 2018-09-24 RX ORDER — ASPIRIN/CALCIUM CARB/MAGNESIUM 324 MG
1 TABLET ORAL
Qty: 0 | Refills: 0 | COMMUNITY

## 2018-09-24 RX ORDER — DOCUSATE SODIUM 100 MG
1 CAPSULE ORAL
Qty: 21 | Refills: 0 | OUTPATIENT
Start: 2018-09-24 | End: 2018-09-30

## 2018-09-24 RX ORDER — ACETAMINOPHEN 500 MG
3 TABLET ORAL
Qty: 0 | Refills: 0 | COMMUNITY
Start: 2018-09-24

## 2018-09-24 RX ORDER — FAMOTIDINE 10 MG/ML
1 INJECTION INTRAVENOUS
Qty: 7 | Refills: 0 | OUTPATIENT
Start: 2018-09-24 | End: 2018-09-30

## 2018-09-24 RX ADMIN — OXYCODONE HYDROCHLORIDE 5 MILLIGRAM(S): 5 TABLET ORAL at 05:00

## 2018-09-24 RX ADMIN — Medication 975 MILLIGRAM(S): at 03:51

## 2018-09-24 RX ADMIN — Medication 975 MILLIGRAM(S): at 11:12

## 2018-09-24 RX ADMIN — INFLUENZA VIRUS VACCINE 0.5 MILLILITER(S): 15; 15; 15; 15 SUSPENSION INTRAMUSCULAR at 11:10

## 2018-09-24 RX ADMIN — Medication 975 MILLIGRAM(S): at 04:40

## 2018-09-24 RX ADMIN — OXYCODONE HYDROCHLORIDE 5 MILLIGRAM(S): 5 TABLET ORAL at 06:13

## 2018-09-24 NOTE — DISCHARGE NOTE ADULT - CARE PROVIDER_API CALL
Leroy Diaz), Neurological Surgery  [address]  [address]  [place] [address]  Phone: [phone]  Fax: [phone]

## 2018-09-24 NOTE — DISCHARGE NOTE ADULT - CARE PLAN
Principal Discharge DX:	Lumbar stenosis with neurogenic claudication  Goal:	return home and regain your independent activity  Assessment and plan of treatment:	Once home call the office to make a follow up appt with Dr Diaz  No heavy lifting anything greater than 10 pounds, no bending or lifting  Able to shower and wash your incision  Pat it dry with a clean towel  Any drainage from the wound or temperature greater than 101.5 degrees F call the office

## 2018-09-24 NOTE — DISCHARGE NOTE ADULT - HOSPITAL COURSE
History of Present Illness: 	  This is a LEFT HANDED/ FOOTED 82 y/o male with PMHx of CAD ( on ASA)  HLD and HTN who presents with progressive LBP and RLE radiculopathy.   The symptoms started suddenly without history of trauma. There is subjective weakness of his right foot and the p-ain goes from the back down the buttock to the posterior thigh ending at the knee. Though he does not need assistive ambulation devices these symptoms are exacerbated with walking more than a block and limit the distance he can do.   He has failed conservative measures and his daily life has been affected due to the symptoms.   He denies urinary or bowel incontinence. Imaging revealed neuroforaminal stenosis and HNP  and after discussion presents for surgical intervention.   (+) ASA use and stopped 1 week ago.     Patient History:   Past Medical History:  CAD (coronary artery disease)  coronory stents x 2  Chronic bilateral low back pain with bilateral sciatica    Claudication    HLD (hyperlipidemia)    HTN (hypertension).    Past Surgical History:  History of appendectomy    History of partial thyroidectomy    History of percutaneous transluminal coronary angioplasty.    Pt underwent Lumbar laminectomy at multiple levels  09/21/2018  Right L2-3, L3-4    Pt discharge to home with services

## 2018-09-24 NOTE — DISCHARGE NOTE ADULT - NS AS ACTIVITY OBS
Do not make important decisions/No Heavy lifting/straining/Do not drive or operate machinery/Walking-Indoors allowed/Walking-Outdoors allowed/Stairs allowed

## 2018-09-24 NOTE — CONSULT NOTE ADULT - PROBLEM SELECTOR RECOMMENDATION 5
Physical condition and is seen by a pulmonologist. The condition could be related to deconditioning from limitation of his exercise capacity due to to spinal stenosis and also elevation of the right hemidiaphragm and atelectasis. Patient is on bronchodilators.

## 2018-09-24 NOTE — DISCHARGE NOTE ADULT - PLAN OF CARE
return home and regain your independent activity Once home call the office to make a follow up appt with Dr Diaz  No heavy lifting anything greater than 10 pounds, no bending or lifting  Able to shower and wash your incision  Pat it dry with a clean towel  Any drainage from the wound or temperature greater than 101.5 degrees F call the office

## 2018-09-24 NOTE — DISCHARGE NOTE ADULT - MEDICATION SUMMARY - MEDICATIONS TO TAKE
I will START or STAY ON the medications listed below when I get home from the hospital:    Medrol Dosepak 4 mg oral tablet  -- 1 packet(s) by mouth once a day   -- It is very important that you take or use this exactly as directed.  Do not skip doses or discontinue unless directed by your doctor.  Obtain medical advice before taking any non-prescription drugs as some may affect the action of this medication.  Take with food or milk.    -- Indication: For anti inflammatory    acetaminophen 325 mg oral tablet  -- 3 tab(s) by mouth every 6 hours  -- Indication: For Pain    Percocet 5/325 oral tablet  -- 1-2 tab(s) by mouth every 4 hours -for bronchospasm - for moderate pain MDD:12  -- Caution federal law prohibits the transfer of this drug to any person other  than the person for whom it was prescribed.  May cause drowsiness.  Alcohol may intensify this effect.  Use care when operating dangerous machinery.  This prescription cannot be refilled.  This product contains acetaminophen.  Do not use  with any other product containing acetaminophen to prevent possible liver damage.  Using more of this medication than prescribed may cause serious breathing problems.    -- Indication: For Moderate pain    Benicar 20 mg oral tablet  -- 1 tab(s) by mouth once a day  -- Indication: For blood pressure    Lipitor 20 mg oral tablet  -- 1 tab(s) by mouth once a day  -- Indication: For Cholestrol    albuterol 90 mcg/inh inhalation aerosol  -- 1 puff(s) inhaled every 4 hours, As needed, Shortness of Breath and/or Wheezing  -- Indication: For inhalant    famotidine 20 mg oral tablet  -- 1 tab(s) by mouth once a day  -- Indication: For antiacid    senna oral tablet  -- 2 tab(s) by mouth once a day (at bedtime)  -- Indication: For fiber pill    Colace 100 mg oral capsule  -- 1 cap(s) by mouth 3 times a day   -- Medication should be taken with plenty of water.    -- Indication: For stool softner    cyclobenzaprine 10 mg oral tablet  -- 1 tab(s) by mouth 3 times a day, As needed, Muscle Spasm MDD:3  -- Indication: For Prevent spasm

## 2018-09-24 NOTE — DISCHARGE NOTE ADULT - PATIENT PORTAL LINK FT
You can access the [facility] Patient Portal, offered by [facility], by registering with the following website: [email or web address]

## 2018-09-24 NOTE — CONSULT NOTE ADULT - PROBLEM SELECTOR RECOMMENDATION 9
Her blood pressure is on the low normal side. The patient is off antihypertensive medication most likely due to the pain meds. Observe and restart his antihypertensive meds

## 2018-09-24 NOTE — CONSULT NOTE ADULT - SUBJECTIVE AND OBJECTIVE BOX
Patient is a 83y old  Male who presents with a chief complaint of back pain (24 Sep 2018 07:13)      HPI:  This is a LEFT HANDED/ FOOTED 84 y/o male with PMHx of CAD ( on ASA)  HLD and HTN who presents with progressive LBP and RLE radiculopathy.   The symptoms started suddenly without history of trauma. There is subjective weakness of his right foot and the p-ain goes from the back down the buttock to the posterior thigh ending at the knee. Though he does not need assistive ambulation devices these symptoms are exacerbated with walking more than a block and limit the distance he can do.   He has failed conservative measures and his daily life has been affected due to the symptoms.   He denies urinary or bowel incontinence. Imaging revealed neuroforaminal stenosis and HNP  and after discussion presents for surgical intervention.   (+) ASA use and stopped 1 week ago. (21 Sep 2018 06:43)      PAST MEDICAL & SURGICAL HISTORY:  Chronic bilateral low back pain with bilateral sciatica  Claudication  HLD (hyperlipidemia)  CAD (coronary artery disease): coronory stents x 2  HTN (hypertension)  History of appendectomy  History of partial thyroidectomy  History of percutaneous transluminal coronary angioplasty      FAMILY HISTORY:      SOCIAL HISTORY:  Smoking Status: [ ] Current, [ ] Former, [x ] Never  Pack Years:    MEDICATIONS:  Pulmonary:  ALBUTerol    90 MICROgram(s) HFA Inhaler 1 Puff(s) Inhalation every 4 hours PRN  ALBUTerol/ipratropium for Nebulization 3 milliLiter(s) Nebulizer every 6 hours PRN  tiotropium 18 MICROgram(s) Capsule 1 Capsule(s) Inhalation daily PRN    Antimicrobials:    Anticoagulants:  enoxaparin Injectable 40 milliGRAM(s) SubCutaneous at bedtime    Onc:    GI/:  famotidine    Tablet 20 milliGRAM(s) Oral daily  senna 2 Tablet(s) Oral at bedtime    Endocrine:    Cardiac:    Other Medications:  acetaminophen   Tablet .. 975 milliGRAM(s) Oral every 6 hours  benzocaine 15 mG/menthol 3.6 mG Lozenge 1 Lozenge Oral every 2 hours PRN  cyclobenzaprine 10 milliGRAM(s) Oral three times a day PRN  HYDROmorphone  Injectable 0.3 milliGRAM(s) IV Push every 30 minutes PRN  HYDROmorphone  Injectable 0.5 milliGRAM(s) IV Push every 4 hours PRN  influenza   Vaccine 0.5 milliLiter(s) IntraMuscular once  ondansetron Injectable 4 milliGRAM(s) IV Push every 6 hours PRN  oxyCODONE    IR 5 milliGRAM(s) Oral every 4 hours PRN  oxyCODONE    IR 10 milliGRAM(s) Oral every 4 hours PRN      Allergies    No Known Allergies    Intolerances        Vital Signs Last 24 Hrs  T(C): 36.7 (24 Sep 2018 09:26), Max: 36.8 (23 Sep 2018 16:37)  T(F): 98.1 (24 Sep 2018 09:26), Max: 98.3 (23 Sep 2018 21:22)  HR: 84 (24 Sep 2018 09:26) (60 - 84)  BP: 128/80 (24 Sep 2018 09:26) (111/73 - 128/80)  BP(mean): --  RR: 16 (24 Sep 2018 09:26) (16 - 17)  SpO2: 97% (24 Sep 2018 09:26) (95% - 98%)    09-23 @ 07:01 - 09-24 @ 07:00  --------------------------------------------------------  IN: 0 mL / OUT: 1840 mL / NET: -1840 mL    09-24 @ 07:01  - 09-24 @ 10:05  --------------------------------------------------------  IN: 350 mL / OUT: 250 mL / NET: 100 mL          LABS:      CBC Full  -  ( 23 Sep 2018 06:24 )  WBC Count : 11.9 K/uL  Hemoglobin : 14.9 g/dL  Hematocrit : 44.4 %  Platelet Count - Automated : 172 K/uL  Mean Cell Volume : 93.1 fL  Mean Cell Hemoglobin : 31.2 pg  Mean Cell Hemoglobin Concentration : 33.6 g/dL  Auto Neutrophil # : x  Auto Lymphocyte # : x  Auto Monocyte # : x  Auto Eosinophil # : x  Auto Basophil # : x  Auto Neutrophil % : x  Auto Lymphocyte % : x  Auto Monocyte % : x  Auto Eosinophil % : x  Auto Basophil % : x    09-23    138  |  100  |  29<H>  ----------------------------<  98  5.0   |  29  |  1.14    Ca    9.3      23 Sep 2018 06:24  Phos  3.5     09-23  Mg     2.2     09-23                          RADIOLOGY & ADDITIONAL STUDIES (The following images were personally reviewed):

## 2018-09-24 NOTE — PROGRESS NOTE ADULT - SUBJECTIVE AND OBJECTIVE BOX
HPI:  This is a LEFT HANDED/ FOOTED 82 y/o male with PMHx of CAD ( on ASA)  HLD and HTN who presents with progressive LBP and RLE radiculopathy.   The symptoms started suddenly without history of trauma. There is subjective weakness of his right foot and the p-ain goes from the back down the buttock to the posterior thigh ending at the knee. Though he does not need assistive ambulation devices these symptoms are exacerbated with walking more than a block and limit the distance he can do.   He has failed conservative measures and his daily life has been affected due to the symptoms.   He denies urinary or bowel incontinence. Imaging revealed neuroforaminal stenosis and HNP  and after discussion presents for surgical intervention.   (+) ASA use and stopped 1 week ago. (21 Sep 2018 06:43)    OVERNIGHT EVENTS:  Vital Signs Last 24 Hrs  T(C): 36.4 (24 Sep 2018 04:35), Max: 36.8 (23 Sep 2018 16:37)  T(F): 97.5 (24 Sep 2018 04:35), Max: 98.3 (23 Sep 2018 21:22)  HR: 60 (24 Sep 2018 04:35) (60 - 75)  BP: 111/73 (24 Sep 2018 04:35) (108/70 - 118/78)  BP(mean): --  RR: 17 (24 Sep 2018 04:35) (16 - 17)  SpO2: 98% (24 Sep 2018 04:35) (95% - 98%)    I&O's Summary    23 Sep 2018 07:01  -  24 Sep 2018 07:00  --------------------------------------------------------  IN: 0 mL / OUT: 1840 mL / NET: -1840 mL        PHYSICAL EXAM:  Neurological:    Exam:  A/Ox3, follows commands, speech clear  5/5 strength throughout   Sensation intact light touch all dermatomes        Cardiovascular: RRR  Respiratory: Lungs CTAB  Gastrointestinal: +BS  Genitourinary: Voiding without difficulty  Extremities: warm and dry  Incision/Wound: CDI      DIET: Regular      LABS:                        14.9   11.9  )-----------( 172      ( 23 Sep 2018 06:24 )             44.4     09-23    138  |  100  |  29<H>  ----------------------------<  98  5.0   |  29  |  1.14    Ca    9.3      23 Sep 2018 06:24  Phos  3.5     09-23  Mg     2.2     09-23              CAPILLARY BLOOD GLUCOSE          Drug Levels: [] N/A    CSF Analysis: [] N/A      Allergies    No Known Allergies    Intolerances      MEDICATIONS:  Antibiotics:    Neuro:  acetaminophen   Tablet .. 975 milliGRAM(s) Oral every 6 hours  cyclobenzaprine 10 milliGRAM(s) Oral three times a day PRN  HYDROmorphone  Injectable 0.3 milliGRAM(s) IV Push every 30 minutes PRN  HYDROmorphone  Injectable 0.5 milliGRAM(s) IV Push every 4 hours PRN  ondansetron Injectable 4 milliGRAM(s) IV Push every 6 hours PRN  oxyCODONE    IR 5 milliGRAM(s) Oral every 4 hours PRN  oxyCODONE    IR 10 milliGRAM(s) Oral every 4 hours PRN    Anticoagulation:  enoxaparin Injectable 40 milliGRAM(s) SubCutaneous at bedtime    OTHER:  ALBUTerol    90 MICROgram(s) HFA Inhaler 1 Puff(s) Inhalation every 4 hours PRN  ALBUTerol/ipratropium for Nebulization 3 milliLiter(s) Nebulizer every 6 hours PRN  benzocaine 15 mG/menthol 3.6 mG Lozenge 1 Lozenge Oral every 2 hours PRN  famotidine    Tablet 20 milliGRAM(s) Oral daily  influenza   Vaccine 0.5 milliLiter(s) IntraMuscular once  senna 2 Tablet(s) Oral at bedtime  tiotropium 18 MICROgram(s) Capsule 1 Capsule(s) Inhalation daily PRN    IVF:    CULTURES:    RADIOLOGY & ADDITIONAL TESTS:      Assessment/Plan:  83y Male s/p right L2-4 lami POD#3    PLAN:  -Pain control  -PT/OOB  -Monitor KEITH output  -Reg diet   -Bowel regimen   -GI ppx   -SQL   -D/W Dr. schmitt HPI:  This is a LEFT HANDED/ FOOTED 82 y/o male with PMHx of CAD ( on ASA)  HLD and HTN who presents with progressive LBP and RLE radiculopathy.   The symptoms started suddenly without history of trauma. There is subjective weakness of his right foot and the p-ain goes from the back down the buttock to the posterior thigh ending at the knee. Though he does not need assistive ambulation devices these symptoms are exacerbated with walking more than a block and limit the distance he can do.   He has failed conservative measures and his daily life has been affected due to the symptoms.   He denies urinary or bowel incontinence. Imaging revealed neuroforaminal stenosis and HNP  and after discussion presents for surgical intervention.   (+) ASA use and stopped 1 week ago. (21 Sep 2018 06:43)    OVERNIGHT EVENTS:  Vital Signs Last 24 Hrs  T(C): 36.4 (24 Sep 2018 04:35), Max: 36.8 (23 Sep 2018 16:37)  T(F): 97.5 (24 Sep 2018 04:35), Max: 98.3 (23 Sep 2018 21:22)  HR: 60 (24 Sep 2018 04:35) (60 - 75)  BP: 111/73 (24 Sep 2018 04:35) (108/70 - 118/78)  BP(mean): --  RR: 17 (24 Sep 2018 04:35) (16 - 17)  SpO2: 98% (24 Sep 2018 04:35) (95% - 98%)    I&O's Summary    23 Sep 2018 07:01  -  24 Sep 2018 07:00  --------------------------------------------------------  IN: 0 mL / OUT: 1840 mL / NET: -1840 mL        PHYSICAL EXAM:  Neurological:    Exam:  A/Ox3, follows commands, speech clear  5/5 strength throughout   Sensation intact light touch all dermatomes        Cardiovascular: RRR  Respiratory: Lungs CTAB  Gastrointestinal: +BS  Genitourinary: Voiding without difficulty  Extremities: warm and dry  Incision/Wound: CDI      DIET: Regular      LABS:                        14.9   11.9  )-----------( 172      ( 23 Sep 2018 06:24 )             44.4     09-23    138  |  100  |  29<H>  ----------------------------<  98  5.0   |  29  |  1.14    Ca    9.3      23 Sep 2018 06:24  Phos  3.5     09-23  Mg     2.2     09-23              CAPILLARY BLOOD GLUCOSE          Drug Levels: [] N/A    CSF Analysis: [] N/A      Allergies    No Known Allergies    Intolerances      MEDICATIONS:  Antibiotics:    Neuro:  acetaminophen   Tablet .. 975 milliGRAM(s) Oral every 6 hours  cyclobenzaprine 10 milliGRAM(s) Oral three times a day PRN  HYDROmorphone  Injectable 0.3 milliGRAM(s) IV Push every 30 minutes PRN  HYDROmorphone  Injectable 0.5 milliGRAM(s) IV Push every 4 hours PRN  ondansetron Injectable 4 milliGRAM(s) IV Push every 6 hours PRN  oxyCODONE    IR 5 milliGRAM(s) Oral every 4 hours PRN  oxyCODONE    IR 10 milliGRAM(s) Oral every 4 hours PRN    Anticoagulation:  enoxaparin Injectable 40 milliGRAM(s) SubCutaneous at bedtime    OTHER:  ALBUTerol    90 MICROgram(s) HFA Inhaler 1 Puff(s) Inhalation every 4 hours PRN  ALBUTerol/ipratropium for Nebulization 3 milliLiter(s) Nebulizer every 6 hours PRN  benzocaine 15 mG/menthol 3.6 mG Lozenge 1 Lozenge Oral every 2 hours PRN  famotidine    Tablet 20 milliGRAM(s) Oral daily  influenza   Vaccine 0.5 milliLiter(s) IntraMuscular once  senna 2 Tablet(s) Oral at bedtime  tiotropium 18 MICROgram(s) Capsule 1 Capsule(s) Inhalation daily PRN    IVF:    CULTURES:    RADIOLOGY & ADDITIONAL TESTS:      Assessment/Plan:  83y Male s/p right L2-4 lami POD#3    PLAN:  -Pain control  -PT/OOB  -Monitor KEITH output  DC today  -Reg diet   -Bowel regimen   -GI ppx   -SQL   -D/W Dr. Diaz

## 2018-09-25 PROCEDURE — 80048 BASIC METABOLIC PNL TOTAL CA: CPT

## 2018-09-25 PROCEDURE — 36415 COLL VENOUS BLD VENIPUNCTURE: CPT

## 2018-09-25 PROCEDURE — 85027 COMPLETE CBC AUTOMATED: CPT

## 2018-09-25 PROCEDURE — 71045 X-RAY EXAM CHEST 1 VIEW: CPT

## 2018-09-25 PROCEDURE — 83735 ASSAY OF MAGNESIUM: CPT

## 2018-09-25 PROCEDURE — 97161 PT EVAL LOW COMPLEX 20 MIN: CPT

## 2018-09-25 PROCEDURE — 97116 GAIT TRAINING THERAPY: CPT

## 2018-09-25 PROCEDURE — C9399: CPT

## 2018-09-25 PROCEDURE — C1889: CPT

## 2018-09-25 PROCEDURE — 90686 IIV4 VACC NO PRSV 0.5 ML IM: CPT

## 2018-09-25 PROCEDURE — 97530 THERAPEUTIC ACTIVITIES: CPT

## 2018-09-25 PROCEDURE — 86850 RBC ANTIBODY SCREEN: CPT

## 2018-09-25 PROCEDURE — 86900 BLOOD TYPING SEROLOGIC ABO: CPT

## 2018-09-25 PROCEDURE — 86901 BLOOD TYPING SEROLOGIC RH(D): CPT

## 2018-09-25 PROCEDURE — 76000 FLUOROSCOPY <1 HR PHYS/QHP: CPT

## 2018-09-25 PROCEDURE — 84100 ASSAY OF PHOSPHORUS: CPT

## 2018-09-25 PROCEDURE — 94640 AIRWAY INHALATION TREATMENT: CPT

## 2018-09-27 DIAGNOSIS — M43.16 SPONDYLOLISTHESIS, LUMBAR REGION: ICD-10-CM

## 2018-09-27 DIAGNOSIS — I25.10 ATHEROSCLEROTIC HEART DISEASE OF NATIVE CORONARY ARTERY WITHOUT ANGINA PECTORIS: ICD-10-CM

## 2018-09-27 DIAGNOSIS — M54.32 SCIATICA, LEFT SIDE: ICD-10-CM

## 2018-09-27 DIAGNOSIS — M47.26 OTHER SPONDYLOSIS WITH RADICULOPATHY, LUMBAR REGION: ICD-10-CM

## 2018-09-27 DIAGNOSIS — M51.16 INTERVERTEBRAL DISC DISORDERS WITH RADICULOPATHY, LUMBAR REGION: ICD-10-CM

## 2018-09-27 DIAGNOSIS — M48.062 SPINAL STENOSIS, LUMBAR REGION WITH NEUROGENIC CLAUDICATION: ICD-10-CM

## 2018-09-27 DIAGNOSIS — E78.5 HYPERLIPIDEMIA, UNSPECIFIED: ICD-10-CM

## 2018-09-27 DIAGNOSIS — Z95.5 PRESENCE OF CORONARY ANGIOPLASTY IMPLANT AND GRAFT: ICD-10-CM

## 2018-09-27 DIAGNOSIS — M54.31 SCIATICA, RIGHT SIDE: ICD-10-CM

## 2018-09-27 DIAGNOSIS — N40.0 BENIGN PROSTATIC HYPERPLASIA WITHOUT LOWER URINARY TRACT SYMPTOMS: ICD-10-CM

## 2018-09-27 DIAGNOSIS — M41.9 SCOLIOSIS, UNSPECIFIED: ICD-10-CM

## 2018-09-27 DIAGNOSIS — I10 ESSENTIAL (PRIMARY) HYPERTENSION: ICD-10-CM

## 2018-10-04 ENCOUNTER — INPATIENT (INPATIENT)
Facility: HOSPITAL | Age: 83
LOS: 7 days | Discharge: HOME HEALTH | DRG: 856 | End: 2018-10-12
Payer: MEDICARE

## 2018-10-04 VITALS
RESPIRATION RATE: 18 BRPM | HEART RATE: 77 BPM | TEMPERATURE: 99 F | DIASTOLIC BLOOD PRESSURE: 76 MMHG | SYSTOLIC BLOOD PRESSURE: 107 MMHG | WEIGHT: 192.02 LBS | OXYGEN SATURATION: 98 %

## 2018-10-04 DIAGNOSIS — Z98.61 CORONARY ANGIOPLASTY STATUS: Chronic | ICD-10-CM

## 2018-10-04 DIAGNOSIS — Z98.890 OTHER SPECIFIED POSTPROCEDURAL STATES: Chronic | ICD-10-CM

## 2018-10-04 DIAGNOSIS — Z90.49 ACQUIRED ABSENCE OF OTHER SPECIFIED PARTS OF DIGESTIVE TRACT: Chronic | ICD-10-CM

## 2018-10-04 DIAGNOSIS — T14.8XXA OTHER INJURY OF UNSPECIFIED BODY REGION, INITIAL ENCOUNTER: ICD-10-CM

## 2018-10-04 DIAGNOSIS — Z01.818 ENCOUNTER FOR OTHER PREPROCEDURAL EXAMINATION: ICD-10-CM

## 2018-10-04 PROBLEM — I25.10 ATHEROSCLEROTIC HEART DISEASE OF NATIVE CORONARY ARTERY WITHOUT ANGINA PECTORIS: Chronic | Status: ACTIVE | Noted: 2018-09-20

## 2018-10-04 PROBLEM — E78.5 HYPERLIPIDEMIA, UNSPECIFIED: Chronic | Status: ACTIVE | Noted: 2018-09-20

## 2018-10-04 PROBLEM — M54.42 LUMBAGO WITH SCIATICA, LEFT SIDE: Chronic | Status: ACTIVE | Noted: 2018-09-20

## 2018-10-04 PROBLEM — I73.9 PERIPHERAL VASCULAR DISEASE, UNSPECIFIED: Chronic | Status: ACTIVE | Noted: 2018-09-20

## 2018-10-04 PROBLEM — I10 ESSENTIAL (PRIMARY) HYPERTENSION: Chronic | Status: ACTIVE | Noted: 2018-09-20

## 2018-10-04 LAB
ALBUMIN SERPL ELPH-MCNC: 3.5 G/DL — SIGNIFICANT CHANGE UP (ref 3.3–5)
ALBUMIN SERPL ELPH-MCNC: 3.7 G/DL — SIGNIFICANT CHANGE UP (ref 3.3–5)
ALP SERPL-CCNC: 64 U/L — SIGNIFICANT CHANGE UP (ref 40–120)
ALP SERPL-CCNC: 68 U/L — SIGNIFICANT CHANGE UP (ref 40–120)
ALT FLD-CCNC: 35 U/L — SIGNIFICANT CHANGE UP (ref 10–45)
ALT FLD-CCNC: SIGNIFICANT CHANGE UP U/L (ref 10–45)
ANION GAP SERPL CALC-SCNC: 12 MMOL/L — SIGNIFICANT CHANGE UP (ref 5–17)
ANION GAP SERPL CALC-SCNC: 13 MMOL/L — SIGNIFICANT CHANGE UP (ref 5–17)
APPEARANCE UR: CLEAR — SIGNIFICANT CHANGE UP
APTT BLD: 39 SEC — HIGH (ref 27.5–37.4)
AST SERPL-CCNC: 26 U/L — SIGNIFICANT CHANGE UP (ref 10–40)
AST SERPL-CCNC: SIGNIFICANT CHANGE UP U/L (ref 10–40)
BASOPHILS NFR BLD AUTO: 0.2 % — SIGNIFICANT CHANGE UP (ref 0–2)
BILIRUB SERPL-MCNC: 3.8 MG/DL — HIGH (ref 0.2–1.2)
BILIRUB SERPL-MCNC: 3.8 MG/DL — HIGH (ref 0.2–1.2)
BILIRUB UR-MCNC: NEGATIVE — SIGNIFICANT CHANGE UP
BUN SERPL-MCNC: 35 MG/DL — HIGH (ref 7–23)
BUN SERPL-MCNC: 36 MG/DL — HIGH (ref 7–23)
CALCIUM SERPL-MCNC: 9.4 MG/DL — SIGNIFICANT CHANGE UP (ref 8.4–10.5)
CALCIUM SERPL-MCNC: 9.6 MG/DL — SIGNIFICANT CHANGE UP (ref 8.4–10.5)
CHLORIDE SERPL-SCNC: 98 MMOL/L — SIGNIFICANT CHANGE UP (ref 96–108)
CHLORIDE SERPL-SCNC: 99 MMOL/L — SIGNIFICANT CHANGE UP (ref 96–108)
CO2 SERPL-SCNC: 24 MMOL/L — SIGNIFICANT CHANGE UP (ref 22–31)
CO2 SERPL-SCNC: 25 MMOL/L — SIGNIFICANT CHANGE UP (ref 22–31)
COLOR SPEC: YELLOW — SIGNIFICANT CHANGE UP
CREAT SERPL-MCNC: 1.3 MG/DL — SIGNIFICANT CHANGE UP (ref 0.5–1.3)
CREAT SERPL-MCNC: 1.3 MG/DL — SIGNIFICANT CHANGE UP (ref 0.5–1.3)
DIFF PNL FLD: NEGATIVE — SIGNIFICANT CHANGE UP
EOSINOPHIL NFR BLD AUTO: 0.1 % — SIGNIFICANT CHANGE UP (ref 0–6)
GLUCOSE SERPL-MCNC: 96 MG/DL — SIGNIFICANT CHANGE UP (ref 70–99)
GLUCOSE SERPL-MCNC: 96 MG/DL — SIGNIFICANT CHANGE UP (ref 70–99)
GLUCOSE UR QL: NEGATIVE — SIGNIFICANT CHANGE UP
HCT VFR BLD CALC: 44.6 % — SIGNIFICANT CHANGE UP (ref 39–50)
HGB BLD-MCNC: 15.5 G/DL — SIGNIFICANT CHANGE UP (ref 13–17)
INR BLD: 1.19 — HIGH (ref 0.88–1.16)
KETONES UR-MCNC: NEGATIVE — SIGNIFICANT CHANGE UP
LACTATE SERPL-SCNC: 2 MMOL/L — SIGNIFICANT CHANGE UP (ref 0.5–2)
LEUKOCYTE ESTERASE UR-ACNC: NEGATIVE — SIGNIFICANT CHANGE UP
LYMPHOCYTES # BLD AUTO: 7.5 % — LOW (ref 13–44)
MCHC RBC-ENTMCNC: 31.8 PG — SIGNIFICANT CHANGE UP (ref 27–34)
MCHC RBC-ENTMCNC: 34.8 G/DL — SIGNIFICANT CHANGE UP (ref 32–36)
MCV RBC AUTO: 91.6 FL — SIGNIFICANT CHANGE UP (ref 80–100)
MONOCYTES NFR BLD AUTO: 9.9 % — SIGNIFICANT CHANGE UP (ref 2–14)
NEUTROPHILS NFR BLD AUTO: 82.3 % — HIGH (ref 43–77)
NITRITE UR-MCNC: NEGATIVE — SIGNIFICANT CHANGE UP
PH UR: 7 — SIGNIFICANT CHANGE UP (ref 5–8)
PLATELET # BLD AUTO: 243 K/UL — SIGNIFICANT CHANGE UP (ref 150–400)
POTASSIUM SERPL-MCNC: 5.2 MMOL/L — SIGNIFICANT CHANGE UP (ref 3.5–5.3)
POTASSIUM SERPL-MCNC: SIGNIFICANT CHANGE UP MMOL/L (ref 3.5–5.3)
POTASSIUM SERPL-SCNC: 5.2 MMOL/L — SIGNIFICANT CHANGE UP (ref 3.5–5.3)
POTASSIUM SERPL-SCNC: SIGNIFICANT CHANGE UP MMOL/L (ref 3.5–5.3)
PROT SERPL-MCNC: 6.7 G/DL — SIGNIFICANT CHANGE UP (ref 6–8.3)
PROT SERPL-MCNC: 6.7 G/DL — SIGNIFICANT CHANGE UP (ref 6–8.3)
PROT UR-MCNC: NEGATIVE MG/DL — SIGNIFICANT CHANGE UP
PROTHROM AB SERPL-ACNC: 13.2 SEC — HIGH (ref 9.8–12.7)
RBC # BLD: 4.87 M/UL — SIGNIFICANT CHANGE UP (ref 4.2–5.8)
RBC # FLD: 13.6 % — SIGNIFICANT CHANGE UP (ref 10.3–16.9)
SODIUM SERPL-SCNC: 135 MMOL/L — SIGNIFICANT CHANGE UP (ref 135–145)
SODIUM SERPL-SCNC: 136 MMOL/L — SIGNIFICANT CHANGE UP (ref 135–145)
SP GR SPEC: <=1.005 — SIGNIFICANT CHANGE UP (ref 1–1.03)
UROBILINOGEN FLD QL: 1 E.U./DL — SIGNIFICANT CHANGE UP
WBC # BLD: 22.7 K/UL — HIGH (ref 3.8–10.5)
WBC # FLD AUTO: 22.7 K/UL — HIGH (ref 3.8–10.5)

## 2018-10-04 PROCEDURE — 72158 MRI LUMBAR SPINE W/O & W/DYE: CPT | Mod: 26

## 2018-10-04 PROCEDURE — 93970 EXTREMITY STUDY: CPT | Mod: 26

## 2018-10-04 PROCEDURE — 99285 EMERGENCY DEPT VISIT HI MDM: CPT | Mod: 25

## 2018-10-04 PROCEDURE — 99223 1ST HOSP IP/OBS HIGH 75: CPT | Mod: GC

## 2018-10-04 PROCEDURE — 72132 CT LUMBAR SPINE W/DYE: CPT | Mod: 26

## 2018-10-04 PROCEDURE — 93010 ELECTROCARDIOGRAM REPORT: CPT

## 2018-10-04 PROCEDURE — 74176 CT ABD & PELVIS W/O CONTRAST: CPT | Mod: 26

## 2018-10-04 PROCEDURE — 99222 1ST HOSP IP/OBS MODERATE 55: CPT | Mod: GC

## 2018-10-04 PROCEDURE — 93306 TTE W/DOPPLER COMPLETE: CPT | Mod: 26

## 2018-10-04 RX ORDER — PIPERACILLIN AND TAZOBACTAM 4; .5 G/20ML; G/20ML
3.38 INJECTION, POWDER, LYOPHILIZED, FOR SOLUTION INTRAVENOUS ONCE
Qty: 0 | Refills: 0 | Status: COMPLETED | OUTPATIENT
Start: 2018-10-04 | End: 2018-10-04

## 2018-10-04 RX ORDER — DOCUSATE SODIUM 100 MG
100 CAPSULE ORAL DAILY
Qty: 0 | Refills: 0 | Status: DISCONTINUED | OUTPATIENT
Start: 2018-10-04 | End: 2018-10-05

## 2018-10-04 RX ORDER — ATORVASTATIN CALCIUM 80 MG/1
1 TABLET, FILM COATED ORAL
Qty: 0 | Refills: 0 | COMMUNITY

## 2018-10-04 RX ORDER — ACETAMINOPHEN 500 MG
975 TABLET ORAL ONCE
Qty: 0 | Refills: 0 | Status: COMPLETED | OUTPATIENT
Start: 2018-10-04 | End: 2018-10-04

## 2018-10-04 RX ORDER — VANCOMYCIN HCL 1 G
1000 VIAL (EA) INTRAVENOUS EVERY 12 HOURS
Qty: 0 | Refills: 0 | Status: DISCONTINUED | OUTPATIENT
Start: 2018-10-04 | End: 2018-10-05

## 2018-10-04 RX ORDER — SODIUM CHLORIDE 9 MG/ML
1000 INJECTION INTRAMUSCULAR; INTRAVENOUS; SUBCUTANEOUS ONCE
Qty: 0 | Refills: 0 | Status: COMPLETED | OUTPATIENT
Start: 2018-10-04 | End: 2018-10-04

## 2018-10-04 RX ORDER — VANCOMYCIN HCL 1 G
1250 VIAL (EA) INTRAVENOUS ONCE
Qty: 0 | Refills: 0 | Status: COMPLETED | OUTPATIENT
Start: 2018-10-04 | End: 2018-10-04

## 2018-10-04 RX ORDER — SENNA PLUS 8.6 MG/1
2 TABLET ORAL AT BEDTIME
Qty: 0 | Refills: 0 | Status: DISCONTINUED | OUTPATIENT
Start: 2018-10-04 | End: 2018-10-05

## 2018-10-04 RX ORDER — CYCLOBENZAPRINE HYDROCHLORIDE 10 MG/1
10 TABLET, FILM COATED ORAL THREE TIMES A DAY
Qty: 0 | Refills: 0 | Status: DISCONTINUED | OUTPATIENT
Start: 2018-10-04 | End: 2018-10-05

## 2018-10-04 RX ORDER — LOSARTAN POTASSIUM 100 MG/1
25 TABLET, FILM COATED ORAL DAILY
Qty: 0 | Refills: 0 | Status: DISCONTINUED | OUTPATIENT
Start: 2018-10-04 | End: 2018-10-05

## 2018-10-04 RX ORDER — SODIUM CHLORIDE 9 MG/ML
1000 INJECTION INTRAMUSCULAR; INTRAVENOUS; SUBCUTANEOUS
Qty: 0 | Refills: 0 | Status: DISCONTINUED | OUTPATIENT
Start: 2018-10-04 | End: 2018-10-05

## 2018-10-04 RX ORDER — OXYCODONE AND ACETAMINOPHEN 5; 325 MG/1; MG/1
1 TABLET ORAL EVERY 4 HOURS
Qty: 0 | Refills: 0 | Status: DISCONTINUED | OUTPATIENT
Start: 2018-10-04 | End: 2018-10-05

## 2018-10-04 RX ORDER — VANCOMYCIN HCL 1 G
1250 VIAL (EA) INTRAVENOUS EVERY 12 HOURS
Qty: 0 | Refills: 0 | Status: DISCONTINUED | OUTPATIENT
Start: 2018-10-04 | End: 2018-10-04

## 2018-10-04 RX ORDER — ACETAMINOPHEN 500 MG
650 TABLET ORAL ONCE
Qty: 0 | Refills: 0 | Status: DISCONTINUED | OUTPATIENT
Start: 2018-10-04 | End: 2018-10-04

## 2018-10-04 RX ORDER — ATORVASTATIN CALCIUM 80 MG/1
10 TABLET, FILM COATED ORAL AT BEDTIME
Qty: 0 | Refills: 0 | Status: DISCONTINUED | OUTPATIENT
Start: 2018-10-04 | End: 2018-10-05

## 2018-10-04 RX ORDER — MORPHINE SULFATE 50 MG/1
4 CAPSULE, EXTENDED RELEASE ORAL ONCE
Qty: 0 | Refills: 0 | Status: DISCONTINUED | OUTPATIENT
Start: 2018-10-04 | End: 2018-10-04

## 2018-10-04 RX ORDER — PIPERACILLIN AND TAZOBACTAM 4; .5 G/20ML; G/20ML
3.38 INJECTION, POWDER, LYOPHILIZED, FOR SOLUTION INTRAVENOUS EVERY 8 HOURS
Qty: 0 | Refills: 0 | Status: DISCONTINUED | OUTPATIENT
Start: 2018-10-04 | End: 2018-10-05

## 2018-10-04 RX ORDER — SODIUM CHLORIDE 9 MG/ML
1000 INJECTION INTRAMUSCULAR; INTRAVENOUS; SUBCUTANEOUS
Qty: 0 | Refills: 0 | Status: DISCONTINUED | OUTPATIENT
Start: 2018-10-04 | End: 2018-10-04

## 2018-10-04 RX ORDER — FAMOTIDINE 10 MG/ML
20 INJECTION INTRAVENOUS DAILY
Qty: 0 | Refills: 0 | Status: DISCONTINUED | OUTPATIENT
Start: 2018-10-04 | End: 2018-10-05

## 2018-10-04 RX ORDER — ACETAMINOPHEN 500 MG
650 TABLET ORAL ONCE
Qty: 0 | Refills: 0 | Status: COMPLETED | OUTPATIENT
Start: 2018-10-04 | End: 2018-10-04

## 2018-10-04 RX ORDER — ACETAMINOPHEN 500 MG
650 TABLET ORAL EVERY 6 HOURS
Qty: 0 | Refills: 0 | Status: DISCONTINUED | OUTPATIENT
Start: 2018-10-04 | End: 2018-10-05

## 2018-10-04 RX ADMIN — PIPERACILLIN AND TAZOBACTAM 3.38 GRAM(S): 4; .5 INJECTION, POWDER, LYOPHILIZED, FOR SOLUTION INTRAVENOUS at 14:35

## 2018-10-04 RX ADMIN — MORPHINE SULFATE 4 MILLIGRAM(S): 50 CAPSULE, EXTENDED RELEASE ORAL at 15:05

## 2018-10-04 RX ADMIN — SODIUM CHLORIDE 2000 MILLILITER(S): 9 INJECTION INTRAMUSCULAR; INTRAVENOUS; SUBCUTANEOUS at 12:49

## 2018-10-04 RX ADMIN — SODIUM CHLORIDE 75 MILLILITER(S): 9 INJECTION INTRAMUSCULAR; INTRAVENOUS; SUBCUTANEOUS at 14:55

## 2018-10-04 RX ADMIN — Medication 166.67 MILLIGRAM(S): at 14:46

## 2018-10-04 RX ADMIN — PIPERACILLIN AND TAZOBACTAM 200 GRAM(S): 4; .5 INJECTION, POWDER, LYOPHILIZED, FOR SOLUTION INTRAVENOUS at 13:38

## 2018-10-04 RX ADMIN — OXYCODONE AND ACETAMINOPHEN 1 TABLET(S): 5; 325 TABLET ORAL at 19:22

## 2018-10-04 RX ADMIN — Medication 975 MILLIGRAM(S): at 13:38

## 2018-10-04 RX ADMIN — MORPHINE SULFATE 4 MILLIGRAM(S): 50 CAPSULE, EXTENDED RELEASE ORAL at 14:35

## 2018-10-04 RX ADMIN — SODIUM CHLORIDE 1000 MILLILITER(S): 9 INJECTION INTRAMUSCULAR; INTRAVENOUS; SUBCUTANEOUS at 14:35

## 2018-10-04 RX ADMIN — Medication 975 MILLIGRAM(S): at 14:08

## 2018-10-04 NOTE — H&P ADULT - NSHPPHYSICALEXAM_GEN_ALL_CORE
A&OX3 Cranial nerves intact  RODRIGUEZ antigravity  c/o RLQ abdominal pain and right flank pain  distal end of the wound is opened, red and purulent draiange

## 2018-10-04 NOTE — CONSULT NOTE ADULT - PROBLEM SELECTOR RECOMMENDATION 3
Her cardiac status is stable and there is no evidence of underlying angina no CHF.  Echocardiogram was unremarkable.

## 2018-10-04 NOTE — ED ADULT TRIAGE NOTE - ARRIVAL INFO ADDITIONAL COMMENTS
pt states had back surgery ~ 1 week ago with reddish-pinkish drain at site. denies fever or headache

## 2018-10-04 NOTE — H&P ADULT - ASSESSMENT
A/P   R/O Wound infection    Admit to Neurosurgery  F/U Admission labs  ESR/CRP  Blood cultures sent  Neuro monitoring  Wound cultures sent  Pain Managment  Resume home medications Medical consult  F/U on all CX  CT L Spine with and without contrast    Dispo: Discussed with attending

## 2018-10-04 NOTE — H&P ADULT - PSH
History of appendectomy    History of partial thyroidectomy    History of percutaneous transluminal coronary angioplasty    Status post lumbar laminectomy

## 2018-10-04 NOTE — ED PROVIDER NOTE - OBJECTIVE STATEMENT
82 yo male h/o asthma, cad s/p stent, hld, htn, dm, cva, bph, spinal stenosis s/p laminectomy 9/21 c/o severe pain R flank radiating to R abd since yest and hematuria this am, associated w le weakness w/o numbness, incontinence.  No n/v/d, dysuria, h/o stones.  Pt notes cont clear drainage from back since his surgery and increased pain in his back at surgery site.  Some relief w oxycodone 2 tab last pm, no meds today for sx.  Pain 8/10, worse when pt tries any movement.  No uri sx, cough, cp, sob.

## 2018-10-04 NOTE — ED PROVIDER NOTE - PROGRESS NOTE DETAILS
Pt discussed w nsg - will eval and request ct w iv contrast lumbar spine.  Pt w leukocytosis - cx's, lactate, empiric abx ordered.  CMP hemolyzed, repeat ordered. Eval by ns - tba to Dr Diaz, tele.  CT neg for stone, ua neg; await ct l spine. Pt discussed w nsg - will eval and request ct w iv contrast lumbar spine.  Pt w leukocytosis - cx's, lactate, crp, esr, empiric abx ordered.  CMP hemolyzed, repeat ordered.

## 2018-10-04 NOTE — ED PROVIDER NOTE - PSH
History of appendectomy    History of partial thyroidectomy    History of percutaneous transluminal coronary angioplasty H/O laminectomy    History of appendectomy    History of partial thyroidectomy    History of percutaneous transluminal coronary angioplasty    Status post lumbar laminectomy

## 2018-10-04 NOTE — CONSULT NOTE ADULT - SUBJECTIVE AND OBJECTIVE BOX
INFECTIOUS DISEASE SERVICE INITIAL CONSULT NOTE    HPI:  84yo left handed male with PMH CAD HDL HTN s/p 9/21/2018 Lumbar lami and discharge with home care. Pt doing well until yesterday noted pain right flank and RLQ pain. Distal end of wound noted to be dehiscence and purulent drainage. Pt chills, afebrile. Denies urinary or bowel dysfunction. (04 Oct 2018 14:18)      ADDITIONAL ID HPI:  Patient states he underwent lumbar laminectomy on 9/21 (L2-L3, L3-L4, L4-L5) with uncomplicated hospital course. His drains were removed POD 2 and he felt well, was able to ambulate unassisted and aside from some minor post-op pain had no complaints. He did note some clear drainage from his post-op wound for which his wife called his doctor who told them it was normal. Drainage has persisted and increased in quantity to the point where he is soaking through dressings/clothing. On the morning of 10/3, patient woke up with new onset right flank pain radiating to RLQ as well as new onset bilateral lower extremity weakness, R>L. He states he went to bed feeling well and woke up in the middle of the night with hematuria (bright red blood) which has since resolved. He also reports some urinary incontinence, states he had small volume of urine produced though he was unaware it was coming out, which is unusual for him. No dysuria, frequency, urgency otherwise. States he has had constipation, no diarrhea, cough, SOB, headaches. No trauma to his back, no quick or abnormal movements or lifting of heavy boxes. Denies paresthesias, saddle anesthesia, fecal incontinence. Reports chills.     PAST MEDICAL & SURGICAL HISTORY:  Asthma  BPH (benign prostatic hyperplasia)  Chronic bilateral low back pain with bilateral sciatica  Claudication  HLD (hyperlipidemia)  CAD (coronary artery disease): coronory stents x 2  HTN (hypertension)  H/O laminectomy  Status post lumbar laminectomy  History of appendectomy  History of partial thyroidectomy  History of percutaneous transluminal coronary angioplasty      REVIEW OF SYSTEMS:  Otherwise negative other than what is stated in the HPI.    ACTIVE ANTIMICROBIAL/ANTIBIOTIC MEDICATIONS:  piperacillin/tazobactam IVPB. 3.375 Gram(s) IV Intermittent every 8 hours  vancomycin  IVPB 1250 milliGRAM(s) IV Intermittent every 12 hours      PRIOR ANTIMICROBIAL HISTORY ON THIS ADMISSION:  piperacillin/tazobactam IVPB.   200 mL/Hr IV Intermittent (10-04-18 @ 13:38)    vancomycin  IVPB   166.67 mL/Hr IV Intermittent (10-04-18 @ 14:46)        OTHER MEDICATIONS:  acetaminophen   Tablet .. 650 milliGRAM(s) Oral every 6 hours PRN  atorvastatin 10 milliGRAM(s) Oral at bedtime  cyclobenzaprine 10 milliGRAM(s) Oral three times a day PRN  docusate sodium 100 milliGRAM(s) Oral daily  famotidine    Tablet 20 milliGRAM(s) Oral daily  losartan 25 milliGRAM(s) Oral daily  oxyCODONE    5 mG/acetaminophen 325 mG 1 Tablet(s) Oral every 4 hours PRN  senna 2 Tablet(s) Oral at bedtime  sodium chloride 0.9%. 1000 milliLiter(s) IV Continuous <Continuous>      ALLERGIES:  Allergies    No Known Allergies    Intolerances        SOCIAL HISTORY:    FAMILY HISTORY:      VITAL SIGNS:  ICU Vital Signs Last 24 Hrs  T(C): 36.8 (04 Oct 2018 19:09), Max: 37.1 (04 Oct 2018 10:37)  T(F): 98.2 (04 Oct 2018 19:09), Max: 98.7 (04 Oct 2018 10:37)  HR: 85 (04 Oct 2018 19:09) (71 - 85)  BP: 156/84 (04 Oct 2018 19:09) (107/76 - 156/84)  BP(mean): --  ABP: --  ABP(mean): --  RR: 18 (04 Oct 2018 19:09) (18 - 20)  SpO2: 98% (04 Oct 2018 19:09) (97% - 100%)      PHYSICAL EXAM:  Constitutional: Uncomfortable appearing, in NAD.   Head: NC/AT  Eyes: PERRL; anicteric sclera  ENMT: no rhinorrhea; no sinus tenderness on palpation; no oropharyngeal lesions, erythema, or exudates	  Neck: supple; no JVD or LAD  Respiratory: CTA B/L  Cardiovascular: +S1/S2, RRR  Gastrointestinal: soft, mild tenderness in RLQ. ND; intact BS; no HSM  Extremities: WWP; no clubbing, cyanosis, or edema  Vascular: 2+ radial, DP/PT pulses B/L  Back: Post-op wound draining large volume yellow fluid. paraspinal lumbar tenderness near wound R>L.   Neurologic: AAOx3; Strength: RLE hip flexor 3/5, LLE hip flexor 4/5, Feet flexor/extensor 5/5 bilaterally. per patient, not limited only to pain. No sensory deficits in b/l LE.     LABS:                        15.5   22.7  )-----------( 243      ( 04 Oct 2018 11:42 )             44.6     10-04    136  |  99  |  36<H>  ----------------------------<  96  5.2   |  24  |  1.30    Ca    9.4      04 Oct 2018 12:46    TPro  6.7  /  Alb  3.7  /  TBili  3.8<H>  /  DBili  x   /  AST  26  /  ALT  35  /  AlkPhos  68  10-04    PT/INR - ( 04 Oct 2018 11:42 )   PT: 13.2 sec;   INR: 1.19          PTT - ( 04 Oct 2018 11:42 )  PTT:39.0 sec  Urinalysis Basic - ( 04 Oct 2018 13:31 )    Color: Yellow / Appearance: Clear / SG: <=1.005 / pH: x  Gluc: x / Ketone: NEGATIVE  / Bili: Negative / Urobili: 1.0 E.U./dL   Blood: x / Protein: NEGATIVE mg/dL / Nitrite: NEGATIVE   Leuk Esterase: NEGATIVE / RBC: x / WBC x   Sq Epi: x / Non Sq Epi: x / Bacteria: x        MICROBIOLOGY:      Cultures pending     RADIOLOGY & ADDITIONAL STUDIES:       CT ABD/PELVIS IMPRESSION:   No hydronephrosis or nephrolithiasis.    Cholelithiasis.    Enlarged prostate.      CT LUMBAR SPINE IMPRESSION:     Status post right L2-3 and L3-4 hemilaminotomy. At the surgical bed, both   superficial to and to the right of the spinous processes, there is   suggestion of a partially rim-enhancing collection. This is nonspecific   and could be expected evolution of seroma, with phlegmon or early abscess   considered given the provided clinical information.    Advanced multilevel degenerative disc disease and facet arthropathy   throughout. See discussion above.

## 2018-10-04 NOTE — ED PROVIDER NOTE - RESPIRATORY NEGATIVE STATEMENT, MLM
Department of Anesthesiology  Preprocedure Note       Name:  Loc Knight   Age:  35 y.o.  :  1987                                          MRN:  [id number]         Date:  2018      Surgeon: Elise Nieto):  Madelyn Ybarra MD    Procedure: Procedure(s):  VITRECTOMY 25 GAUGE, MEMBRANE PEELING, SILICONE INJECTION    Medications prior to admission:   Prior to Admission medications    Medication Sig Start Date End Date Taking? Authorizing Provider   traMADol (ULTRAM) 50 MG tablet Take 50 mg by mouth 2 times daily as needed for Pain. Mickeal Rink Historical Provider, MD   lisinopril (PRINIVIL;ZESTRIL) 20 MG tablet Take 20 mg by mouth daily    Historical Provider, MD   acetaminophen (TYLENOL) 500 MG tablet Take 1,000 mg by mouth every 6 hours as needed for Pain    Historical Provider, MD   ibuprofen (ADVIL;MOTRIN) 800 MG tablet Take 800 mg by mouth as needed for Pain     Historical Provider, MD   insulin aspart (NOVOLOG) 100 UNIT/ML injection vial Inject into the skin 4 times daily (before meals and nightly) SLIDING SCALE    Historical Provider, MD   metFORMIN (GLUCOPHAGE) 1000 MG tablet Take 1,000 mg by mouth 2 times daily (with meals)    Historical Provider, MD   Insulin NPH Isophane & Regular (NOVOLIN 70/30 RELION SC) Inject 35 Units into the skin 2 times daily     Historical Provider, MD   insulin glargine (LANTUS) 100 UNIT/ML injection vial Inject 50 Units into the skin nightly     Historical Provider, MD   gabapentin (NEURONTIN) 600 MG tablet Take 1 tablet by mouth daily 16   Benito Calvert MD       Current medications:    No current facility-administered medications for this visit. No current outpatient prescriptions on file.      Facility-Administered Medications Ordered in Other Visits   Medication Dose Route Frequency Provider Last Rate Last Dose    tropicamide (MYDRIACYL) 1 % ophthalmic solution 1 drop  1 drop Right Eye Q15 Min Madelyn Ybarra MD   1 drop at 18 0994    interval change,                   Neuro/Psych:   (+) neuromuscular disease:,              ROS comment: neuropathy GI/Hepatic/Renal:             Endo/Other:    (+) DiabetesType II DM, no interval change, using insulin, . Pt had PAT visit. Abdominal:           Vascular:   + PVD, aortic or cerebral, . Anesthesia Plan      MAC     ASA 4     (Asa 4 dm)  Induction: intravenous. MIPS: Postoperative opioids intended. Anesthetic plan and risks discussed with patient. Plan discussed with CRNA.                   Jonathan Goyal MD   9/29/2018 no chest pain, no cough, and no shortness of breath.

## 2018-10-04 NOTE — CONSULT NOTE ADULT - SUBJECTIVE AND OBJECTIVE BOX
Patient is a 83y old  Male who presents with a chief complaint of Wound draiange (04 Oct 2018 14:18)      HPI:  82yo left handed male with PMH CAD HDL HTN s/p 9/21/2018 Lumbar lami and discharge with home care. Pt doing well until yesterday noted pain right flank and RLQ pain. Distal end of wound noted to be dehiscence and purulent drainage. Pt chills, afebrile. Denies urinary or bowel dysfunction. (04 Oct 2018 14:18)      PAST MEDICAL & SURGICAL HISTORY:  Asthma  BPH (benign prostatic hyperplasia)  Chronic bilateral low back pain with bilateral sciatica  Claudication  HLD (hyperlipidemia)  CAD (coronary artery disease): coronory stents x 2  HTN (hypertension)  H/O laminectomy  Status post lumbar laminectomy  History of appendectomy  History of partial thyroidectomy  History of percutaneous transluminal coronary angioplasty      FAMILY HISTORY:      SOCIAL HISTORY:  Smoking Status: [ ] Current, [ ] Former, [ ] Never  Pack Years:    MEDICATIONS:  Pulmonary:    Antimicrobials:    Anticoagulants:    Onc:    GI/:  docusate sodium 100 milliGRAM(s) Oral daily  famotidine    Tablet 20 milliGRAM(s) Oral daily  senna 2 Tablet(s) Oral at bedtime    Endocrine:  atorvastatin 10 milliGRAM(s) Oral at bedtime    Cardiac:  losartan 25 milliGRAM(s) Oral daily    Other Medications:  cyclobenzaprine 10 milliGRAM(s) Oral three times a day PRN  oxyCODONE    5 mG/acetaminophen 325 mG 1 Tablet(s) Oral every 4 hours PRN  sodium chloride 0.9%. 1000 milliLiter(s) IV Continuous <Continuous>  sodium chloride 0.9%. 1000 milliLiter(s) IV Continuous <Continuous>      Allergies    No Known Allergies    Intolerances        Vital Signs Last 24 Hrs  T(C): 36.6 (04 Oct 2018 14:30), Max: 37.1 (04 Oct 2018 10:37)  T(F): 97.8 (04 Oct 2018 14:30), Max: 98.7 (04 Oct 2018 10:37)  HR: 71 (04 Oct 2018 14:30) (71 - 77)  BP: 112/71 (04 Oct 2018 14:30) (107/76 - 112/71)  BP(mean): --  RR: 18 (04 Oct 2018 14:30) (18 - 18)  SpO2: 100% (04 Oct 2018 14:30) (98% - 100%)        LABS:      CBC Full  -  ( 04 Oct 2018 11:42 )  WBC Count : 22.7 K/uL  Hemoglobin : 15.5 g/dL  Hematocrit : 44.6 %  Platelet Count - Automated : 243 K/uL  Mean Cell Volume : 91.6 fL  Mean Cell Hemoglobin : 31.8 pg  Mean Cell Hemoglobin Concentration : 34.8 g/dL  Auto Neutrophil # : x  Auto Lymphocyte # : x  Auto Monocyte # : x  Auto Eosinophil # : x  Auto Basophil # : x  Auto Neutrophil % : 82.3 %  Auto Lymphocyte % : 7.5 %  Auto Monocyte % : 9.9 %  Auto Eosinophil % : 0.1 %  Auto Basophil % : 0.2 %    10-04    136  |  99  |  36<H>  ----------------------------<  96  5.2   |  24  |  1.30    Ca    9.4      04 Oct 2018 12:46    TPro  6.7  /  Alb  3.7  /  TBili  3.8<H>  /  DBili  x   /  AST  26  /  ALT  35  /  AlkPhos  68  10-04    PT/INR - ( 04 Oct 2018 11:42 )   PT: 13.2 sec;   INR: 1.19          PTT - ( 04 Oct 2018 11:42 )  PTT:39.0 sec      Urinalysis Basic - ( 04 Oct 2018 13:31 )    Color: Yellow / Appearance: Clear / SG: <=1.005 / pH: x  Gluc: x / Ketone: NEGATIVE  / Bili: Negative / Urobili: 1.0 E.U./dL   Blood: x / Protein: NEGATIVE mg/dL / Nitrite: NEGATIVE   Leuk Esterase: NEGATIVE / RBC: x / WBC x   Sq Epi: x / Non Sq Epi: x / Bacteria: x          < from: Xray Chest 1 View-PORTABLE IMMEDIATE (09.23.18 @ 10:42) >  EXAM:  XR CHEST PORTABLE IMMED 1V                          PROCEDURE DATE:  09/23/2018          INTERPRETATION:    PORTABLE CHEST X-RAY    Indication: Lumbar laminectomy. r/o effusion/atelectasis    Bedside examination of the chest dated 9/23/2018 10:42 AM is compared to   the previous study of 8/9/2006.    Findings: Worsening elevation of right hemidiaphragm and/or right   subpulmonic effusion. Left lung clear. No left effusion.   Cardiomediastinal silhouette suboptimally evaluated in this projection.   Degenerative changes right shoulder.    Impression: Since 8/9/2006, there has been worsening of elevation of   right hemidiaphragm.    < end of copied text >  < from: CT Abdomen and Pelvis No Cont (10.04.18 @ 13:20) >  EXAM:  CT ABDOMEN AND PELVIS                          PROCEDURE DATE:  10/04/2018          INTERPRETATION:  CT of the ABDOMEN and PELVIS without intravenous   contrast dated 10/4/2018 1:20 PM    INDICATION: Right flank pain. Hematuria. Rule out stones.    TECHNIQUE: CT of the abdomen and pelvis without intravenous or oral   contrast. Axial, sagittal, and coronal images were obtained and reviewed.    COMPARISON: None.    FINDINGS:    Evaluation of the abdominopelvic viscera is limited due to noncontrast   technique.    Lower chest: Clear lung bases. Severe coronary artery calcifications.   Atelectatic changes are seen in the lung bases.    Liver: Smooth in contour. No focal mass.     Biliary system: Cholelithiasis. No CT evidence of acute cholecystitis. No   biliary ductal dilatation.    Pancreas: Unremarkable.    Spleen: Unremarkable.    Adrenal glands: Unremarkable.    Kidneys: No hydronephrosis. No renal or ureteral calculus.   Urinary Bladder: Unremarkable.    Reproductive organs: Enlarged prostate measuring up to 5.6 cm.     Bowel/Peritoneum: Normal caliber without evidence of obstruction. Mild   colonic diverticulosis. No evidence of acute diverticulitis. Normal   appendix. No ascites or extraluminal gas.     Lymph nodes: No lymphadenopathy.    Aorta/IVC: Calcific atherosclerotic disease. No aneurysm.    Abdominal wall: Small fat-containing left inguinal hernia.    Bones/Soft tissues: Scoliosis and advanced degenerative changes.   Postoperative changes with evidence of lumbar laminectomy.      IMPRESSION:   No hydronephrosis or nephrolithiasis.    Cholelithiasis.    < end of copied text >  < from: Echocardiogram (10.04.18 @ 16:12) >  EXAM:  ECHOCARDIOGRAM (CARDIOL)                          PROCEDURE DATE:  10/04/2018          INTERPRETATION:  Patient Height: 167.0 cm  Patient Weight: 87.0 kg  Heart Rate: 58 bpm  Systolic Pressure: 89 mmHg  Diastolic Pressure: 55 mmHg  BSA: 2.0 m^2  Interpretation Summary  There is severe concentric left ventricular hypertrophy.The left   ventricular   wall motion is normal.The left ventricular ejection fraction is   estimated to   be 55-60%The left atrial size is normal.Right atrial size isnormal.The   right   ventricle is normal in size and function.Structurally normal aortic   valve.There is mild aortic regurgitation.Structurally normal mitral   valve.No   mitral regurgitation noted.Structurally normal tricuspid valve.There is   mild   tricuspid regurgitation.There is no echocardiographic evidence for   pulmonary   hypertension.Structurally normal pulmonic valve.No aortic root   dilatation.There is no pericardial effusion.    < end of copied text >        RADIOLOGY & ADDITIONAL STUDIES (The following images were personally reviewed):

## 2018-10-04 NOTE — ED CLERICAL - NS ED CLERK NOTE PRE-ARRIVAL INFORMATION; ADDITIONAL PRE-ARRIVAL INFORMATION
82 Y/O M NERY BOWEN BEING SENT IN BY DR MAYCO MAIER FOR RECENT FOR LAMINECTOMY ASTHMA HTN OBESITY C/O HEMATURIA FLANK PAIN STONE? PLEASE CALL UROLOGY

## 2018-10-04 NOTE — H&P ADULT - HISTORY OF PRESENT ILLNESS
82yo left handed male with PMH CAD HDL HTN s/p 9/21/2018 Lumbar lami and discharge with home care. Pt doing well until yesterday noted pain right flank and RLQ pain. Distal end of wound noted to be dehiscence and purulent drainage. Pt chills, afebrile. Denies urinary or bowel dysfunction.

## 2018-10-04 NOTE — H&P ADULT - NSHPREVIEWOFSYSTEMS_GEN_ALL_CORE
Neuro A&OX3 Cranial nerves intact  RODRIGUEZ antigravity  CV: RRR  PV: +peripheal pulses  Pulm: Lungs CTAB  GI: Abdomen round soft +BS   : Voiding without difficulty  Skin warm and dry distal end of incision opened red and purulent drainage

## 2018-10-04 NOTE — ED PROVIDER NOTE - PMH
CAD (coronary artery disease)  coronory stents x 2  Chronic bilateral low back pain with bilateral sciatica    Claudication    HLD (hyperlipidemia)    HTN (hypertension) Asthma    BPH (benign prostatic hyperplasia)    CAD (coronary artery disease)  coronory stents x 2  Chronic bilateral low back pain with bilateral sciatica    Claudication    HLD (hyperlipidemia)    HTN (hypertension)

## 2018-10-04 NOTE — CONSULT NOTE ADULT - PROBLEM SELECTOR RECOMMENDATION 7
Patient is scheduled for surgery tomorrow. Don't was aspirated. The antibiotic. Her white count is elevated

## 2018-10-04 NOTE — CONSULT NOTE ADULT - PROBLEM SELECTOR RECOMMENDATION 8
The patient's medical condition is optimized for surgery.  There is no contraindication for surgery.  There is no clinical evidence neither of angina, decompensated CHF, arrhthymias, nor valvular disease.   There is no limitation of exercise capacity.  MET is 5 .  ASA class is 2.  [place] cardiac risk factor is low  .  DVT prophylaxis is indicated.  Pain control.  Early mobilization.  Avoid fluid overload.  The patient is going for a minor procedure.Patient was doing well postoperatively until yesterday

## 2018-10-04 NOTE — ED PROVIDER NOTE - NEUROLOGICAL, MLM
awake, alert, oriented x 3, CN II-XII grossly intact, motor 5/5 ue, 4/5 bilat le, no saddle anesthesia, intact rectal tone, no gross sens deficits, speech clear.

## 2018-10-04 NOTE — CONSULT NOTE ADULT - ASSESSMENT
Patient is an 83 year old man with PMH CAD, HLD, HTN, lumbar radiculopathy for which he is s/p lumbar laminectomy performed 9/21. While post-op course initially uncomplicated, patient now presents with drainage from his post-op wound, WBC 22, new onset bilateral lower extremity weakness R>L and severe back pain. CT L-spine remarkable for partially rim-enhancing collection at surgical bed. Patient with post-op wound infection and neuro deficits requiring surgical washout for definitive treatment.     Recommendations are as follows:    1. Urgent surgical intervention for wound infection, need definitive source control and patient with neuro deficits requiring surgical intervention.   2. Please send sterile wound cultures from OR- this will be imperative in guiding antibiotic therapy going forward.  3. Start vancomycin and zosyn for now. Please check a vancomycin trough 1 hour before fourth dose.  4. F/u BC  5. Would likely benefit from MRI for better assessment of spine.     ID will continue to follow with you. Patient is an 83 year old man with PMH CAD, HLD, HTN, lumbar radiculopathy for which he is s/p lumbar laminectomy performed 9/21. While post-op course initially uncomplicated, patient now presents with drainage from his post-op wound, WBC 22, new onset bilateral lower extremity weakness R>L and severe back pain. CT L-spine remarkable for partially rim-enhancing collection at surgical bed. Patient with post-op wound infection and neuro deficits - expect that he will require surgical washout for definitive treatment.     Recommendations are as follows:    1. MRI of lumbar spine with and without rafael  2. Depending upon result may need urgent surgical intervention for deep infection.  Regardless, need definitive source control which is unlikely to be obtained with antibiotics alone.   3. Please send sterile wound cultures from OR- this will be imperative in guiding antibiotic therapy going forward.  4. Would decrease vancomycin to 1 g IV q12h - his Cr is above baseline, unclear whether or not his renal function is stable.  Would check trough prior to 4th dose.  5.  Pip-tazo 3.375 g IV q8h  6. F/u BC -pending    Recommendations discussed with primary team.  ID will continue to follow with you.

## 2018-10-04 NOTE — ED PROVIDER NOTE - MEDICAL DECISION MAKING DETAILS
Pt c/o new flank pain and hematuria w cvat on R - ? stone, pyelo or complication from pt's recent surgery.  Plan labs, ct for stone, discuss w nsg and imaging prn their recommendations for pt's back.  Pain meds prn.  Reassess. Pt c/o new flank pain and hematuria w cvat on R - ? stone, pyelo or complication from pt's recent surgery.  Pt w poss wound infection, + dehiscence of wound.  Plan labs, ct for stone, discuss w nsg and imaging ? mri vs ct based on their recommendations for pt's back.  Pain meds prn - pt requests tylenol at this time.  Reassess.

## 2018-10-04 NOTE — CONSULT NOTE ADULT - PROBLEM SELECTOR RECOMMENDATION 5
Physical condition and is seen by a pulmonologist. The condition could be related to deconditioning from limitation of his exercise capacity due to to spinal stenosis and also elevation of the right hemidiaphragm and atelectasis. Patient is on bronchodilators.Patient is stable and baseline oxygen saturation was normal

## 2018-10-04 NOTE — ED PROVIDER NOTE - MUSCULOSKELETAL, MLM
+ ttp lumbar spine, + dehiscence and serosanguinous drainage lower part of incision, + surrounding erythema, warmth; + pain in back w rom bilat le, + from bilat le, dp 1+

## 2018-10-05 LAB
CRP SERPL-MCNC: 19.8 MG/DL — HIGH (ref 0–0.4)
CULTURE RESULTS: SIGNIFICANT CHANGE UP
ERYTHROCYTE [SEDIMENTATION RATE] IN BLOOD: 31 MM/HR — HIGH
GRAM STN FLD: SIGNIFICANT CHANGE UP
LACTATE SERPL-SCNC: 1.4 MMOL/L — SIGNIFICANT CHANGE UP (ref 0.5–2)
MAGNESIUM SERPL-MCNC: 2.1 MG/DL — SIGNIFICANT CHANGE UP (ref 1.6–2.6)
METHOD TYPE: SIGNIFICANT CHANGE UP
MSSA DNA SPEC QL NAA+PROBE: SIGNIFICANT CHANGE UP
PHOSPHATE SERPL-MCNC: 2.7 MG/DL — SIGNIFICANT CHANGE UP (ref 2.5–4.5)
SPECIMEN SOURCE: SIGNIFICANT CHANGE UP

## 2018-10-05 PROCEDURE — 71045 X-RAY EXAM CHEST 1 VIEW: CPT | Mod: 26

## 2018-10-05 PROCEDURE — 99233 SBSQ HOSP IP/OBS HIGH 50: CPT | Mod: GC

## 2018-10-05 RX ORDER — CEFEPIME 1 G/1
2000 INJECTION, POWDER, FOR SOLUTION INTRAMUSCULAR; INTRAVENOUS ONCE
Qty: 0 | Refills: 0 | Status: COMPLETED | OUTPATIENT
Start: 2018-10-05 | End: 2018-10-05

## 2018-10-05 RX ORDER — OXYCODONE AND ACETAMINOPHEN 5; 325 MG/1; MG/1
1 TABLET ORAL EVERY 4 HOURS
Qty: 0 | Refills: 0 | Status: DISCONTINUED | OUTPATIENT
Start: 2018-10-05 | End: 2018-10-07

## 2018-10-05 RX ORDER — NAFCILLIN 10 G/100ML
2 INJECTION, POWDER, FOR SOLUTION INTRAVENOUS EVERY 4 HOURS
Qty: 0 | Refills: 0 | Status: DISCONTINUED | OUTPATIENT
Start: 2018-10-05 | End: 2018-10-12

## 2018-10-05 RX ORDER — NAFCILLIN 10 G/100ML
2 INJECTION, POWDER, FOR SOLUTION INTRAVENOUS EVERY 4 HOURS
Qty: 0 | Refills: 0 | Status: DISCONTINUED | OUTPATIENT
Start: 2018-10-05 | End: 2018-10-05

## 2018-10-05 RX ORDER — CEFEPIME 1 G/1
INJECTION, POWDER, FOR SOLUTION INTRAMUSCULAR; INTRAVENOUS
Qty: 0 | Refills: 0 | Status: DISCONTINUED | OUTPATIENT
Start: 2018-10-05 | End: 2018-10-07

## 2018-10-05 RX ORDER — NAFCILLIN 10 G/100ML
2 INJECTION, POWDER, FOR SOLUTION INTRAVENOUS ONCE
Qty: 0 | Refills: 0 | Status: DISCONTINUED | OUTPATIENT
Start: 2018-10-05 | End: 2018-10-05

## 2018-10-05 RX ORDER — ATORVASTATIN CALCIUM 80 MG/1
10 TABLET, FILM COATED ORAL AT BEDTIME
Qty: 0 | Refills: 0 | Status: DISCONTINUED | OUTPATIENT
Start: 2018-10-05 | End: 2018-10-12

## 2018-10-05 RX ORDER — CEFEPIME 1 G/1
INJECTION, POWDER, FOR SOLUTION INTRAMUSCULAR; INTRAVENOUS
Qty: 0 | Refills: 0 | Status: DISCONTINUED | OUTPATIENT
Start: 2018-10-05 | End: 2018-10-05

## 2018-10-05 RX ORDER — NAFCILLIN 10 G/100ML
INJECTION, POWDER, FOR SOLUTION INTRAVENOUS
Qty: 0 | Refills: 0 | Status: DISCONTINUED | OUTPATIENT
Start: 2018-10-05 | End: 2018-10-12

## 2018-10-05 RX ORDER — SENNA PLUS 8.6 MG/1
2 TABLET ORAL AT BEDTIME
Qty: 0 | Refills: 0 | Status: DISCONTINUED | OUTPATIENT
Start: 2018-10-05 | End: 2018-10-05

## 2018-10-05 RX ORDER — SENNA PLUS 8.6 MG/1
2 TABLET ORAL AT BEDTIME
Qty: 0 | Refills: 0 | Status: DISCONTINUED | OUTPATIENT
Start: 2018-10-05 | End: 2018-10-12

## 2018-10-05 RX ORDER — HYDROMORPHONE HYDROCHLORIDE 2 MG/ML
0.4 INJECTION INTRAMUSCULAR; INTRAVENOUS; SUBCUTANEOUS
Qty: 0 | Refills: 0 | Status: DISCONTINUED | OUTPATIENT
Start: 2018-10-05 | End: 2018-10-06

## 2018-10-05 RX ORDER — FAMOTIDINE 10 MG/ML
20 INJECTION INTRAVENOUS DAILY
Qty: 0 | Refills: 0 | Status: DISCONTINUED | OUTPATIENT
Start: 2018-10-05 | End: 2018-10-12

## 2018-10-05 RX ORDER — CYCLOBENZAPRINE HYDROCHLORIDE 10 MG/1
10 TABLET, FILM COATED ORAL THREE TIMES A DAY
Qty: 0 | Refills: 0 | Status: DISCONTINUED | OUTPATIENT
Start: 2018-10-05 | End: 2018-10-07

## 2018-10-05 RX ORDER — ONDANSETRON 8 MG/1
4 TABLET, FILM COATED ORAL EVERY 6 HOURS
Qty: 0 | Refills: 0 | Status: DISCONTINUED | OUTPATIENT
Start: 2018-10-05 | End: 2018-10-12

## 2018-10-05 RX ORDER — SODIUM CHLORIDE 9 MG/ML
500 INJECTION INTRAMUSCULAR; INTRAVENOUS; SUBCUTANEOUS ONCE
Qty: 0 | Refills: 0 | Status: COMPLETED | OUTPATIENT
Start: 2018-10-05 | End: 2018-10-05

## 2018-10-05 RX ORDER — DOCUSATE SODIUM 100 MG
100 CAPSULE ORAL DAILY
Qty: 0 | Refills: 0 | Status: DISCONTINUED | OUTPATIENT
Start: 2018-10-05 | End: 2018-10-06

## 2018-10-05 RX ORDER — NAFCILLIN 10 G/100ML
INJECTION, POWDER, FOR SOLUTION INTRAVENOUS
Qty: 0 | Refills: 0 | Status: DISCONTINUED | OUTPATIENT
Start: 2018-10-05 | End: 2018-10-05

## 2018-10-05 RX ORDER — SODIUM CHLORIDE 9 MG/ML
1000 INJECTION INTRAMUSCULAR; INTRAVENOUS; SUBCUTANEOUS
Qty: 0 | Refills: 0 | Status: DISCONTINUED | OUTPATIENT
Start: 2018-10-05 | End: 2018-10-06

## 2018-10-05 RX ORDER — CEFEPIME 1 G/1
2000 INJECTION, POWDER, FOR SOLUTION INTRAMUSCULAR; INTRAVENOUS EVERY 12 HOURS
Qty: 0 | Refills: 0 | Status: DISCONTINUED | OUTPATIENT
Start: 2018-10-06 | End: 2018-10-07

## 2018-10-05 RX ORDER — PIPERACILLIN AND TAZOBACTAM 4; .5 G/20ML; G/20ML
3.38 INJECTION, POWDER, LYOPHILIZED, FOR SOLUTION INTRAVENOUS ONCE
Qty: 0 | Refills: 0 | Status: DISCONTINUED | OUTPATIENT
Start: 2018-10-05 | End: 2018-10-05

## 2018-10-05 RX ORDER — HYDROMORPHONE HYDROCHLORIDE 2 MG/ML
30 INJECTION INTRAMUSCULAR; INTRAVENOUS; SUBCUTANEOUS
Qty: 0 | Refills: 0 | Status: DISCONTINUED | OUTPATIENT
Start: 2018-10-05 | End: 2018-10-06

## 2018-10-05 RX ORDER — NALOXONE HYDROCHLORIDE 4 MG/.1ML
0.1 SPRAY NASAL
Qty: 0 | Refills: 0 | Status: DISCONTINUED | OUTPATIENT
Start: 2018-10-05 | End: 2018-10-06

## 2018-10-05 RX ORDER — NAFCILLIN 10 G/100ML
2 INJECTION, POWDER, FOR SOLUTION INTRAVENOUS ONCE
Qty: 0 | Refills: 0 | Status: COMPLETED | OUTPATIENT
Start: 2018-10-05 | End: 2018-10-05

## 2018-10-05 RX ORDER — LOSARTAN POTASSIUM 100 MG/1
25 TABLET, FILM COATED ORAL DAILY
Qty: 0 | Refills: 0 | Status: DISCONTINUED | OUTPATIENT
Start: 2018-10-05 | End: 2018-10-08

## 2018-10-05 RX ORDER — HYDROMORPHONE HYDROCHLORIDE 2 MG/ML
0.5 INJECTION INTRAMUSCULAR; INTRAVENOUS; SUBCUTANEOUS ONCE
Qty: 0 | Refills: 0 | Status: DISCONTINUED | OUTPATIENT
Start: 2018-10-05 | End: 2018-10-05

## 2018-10-05 RX ADMIN — HYDROMORPHONE HYDROCHLORIDE 30 MILLILITER(S): 2 INJECTION INTRAMUSCULAR; INTRAVENOUS; SUBCUTANEOUS at 16:14

## 2018-10-05 RX ADMIN — PIPERACILLIN AND TAZOBACTAM 25 GRAM(S): 4; .5 INJECTION, POWDER, LYOPHILIZED, FOR SOLUTION INTRAVENOUS at 06:10

## 2018-10-05 RX ADMIN — OXYCODONE AND ACETAMINOPHEN 1 TABLET(S): 5; 325 TABLET ORAL at 07:50

## 2018-10-05 RX ADMIN — SENNA PLUS 2 TABLET(S): 8.6 TABLET ORAL at 00:10

## 2018-10-05 RX ADMIN — SENNA PLUS 2 TABLET(S): 8.6 TABLET ORAL at 22:43

## 2018-10-05 RX ADMIN — OXYCODONE AND ACETAMINOPHEN 1 TABLET(S): 5; 325 TABLET ORAL at 08:30

## 2018-10-05 RX ADMIN — HYDROMORPHONE HYDROCHLORIDE 0.5 MILLIGRAM(S): 2 INJECTION INTRAMUSCULAR; INTRAVENOUS; SUBCUTANEOUS at 15:13

## 2018-10-05 RX ADMIN — ATORVASTATIN CALCIUM 10 MILLIGRAM(S): 80 TABLET, FILM COATED ORAL at 00:10

## 2018-10-05 RX ADMIN — PIPERACILLIN AND TAZOBACTAM 25 GRAM(S): 4; .5 INJECTION, POWDER, LYOPHILIZED, FOR SOLUTION INTRAVENOUS at 00:11

## 2018-10-05 RX ADMIN — SODIUM CHLORIDE 1000 MILLILITER(S): 9 INJECTION INTRAMUSCULAR; INTRAVENOUS; SUBCUTANEOUS at 07:12

## 2018-10-05 RX ADMIN — LOSARTAN POTASSIUM 25 MILLIGRAM(S): 100 TABLET, FILM COATED ORAL at 06:09

## 2018-10-05 RX ADMIN — Medication 250 MILLIGRAM(S): at 06:09

## 2018-10-05 RX ADMIN — CEFEPIME 100 MILLIGRAM(S): 1 INJECTION, POWDER, FOR SOLUTION INTRAMUSCULAR; INTRAVENOUS at 17:00

## 2018-10-05 RX ADMIN — NAFCILLIN 200 GRAM(S): 10 INJECTION, POWDER, FOR SOLUTION INTRAVENOUS at 22:43

## 2018-10-05 RX ADMIN — ATORVASTATIN CALCIUM 10 MILLIGRAM(S): 80 TABLET, FILM COATED ORAL at 22:43

## 2018-10-05 RX ADMIN — NAFCILLIN 200 GRAM(S): 10 INJECTION, POWDER, FOR SOLUTION INTRAVENOUS at 19:01

## 2018-10-05 RX ADMIN — HYDROMORPHONE HYDROCHLORIDE 0.5 MILLIGRAM(S): 2 INJECTION INTRAMUSCULAR; INTRAVENOUS; SUBCUTANEOUS at 14:45

## 2018-10-05 RX ADMIN — NAFCILLIN 200 GRAM(S): 10 INJECTION, POWDER, FOR SOLUTION INTRAVENOUS at 16:18

## 2018-10-05 RX ADMIN — SODIUM CHLORIDE 125 MILLILITER(S): 9 INJECTION INTRAMUSCULAR; INTRAVENOUS; SUBCUTANEOUS at 00:13

## 2018-10-05 NOTE — BRIEF OPERATIVE NOTE - OPERATION/FINDINGS
dehiscence to posterior pole of incision. superficial and subfascial layers with very minimal clear yellow drainage. No overt fluid collection or abscess identified.

## 2018-10-05 NOTE — PROGRESS NOTE ADULT - SUBJECTIVE AND OBJECTIVE BOX
NEUROSURGERY POST OP NOTE:    POD# 0 S/P lumbar wound washout POD#1    S: Patient reports some belching and difficulty focusing but tolerating PO diet and pain well controlled.      T(C): 36.1 (10-05-18 @ 16:15), Max: 37.6 (10-05-18 @ 05:22)  HR: 67 (10-05-18 @ 16:30) (65 - 100)  BP: 100/53 (10-05-18 @ 16:30) (90/51 - 156/84)  RR: 18 (10-05-18 @ 16:30) (18 - 20)  SpO2: 98% (10-05-18 @ 16:30) (93% - 99%)      10-04-18 @ 07:01  -  10-05-18 @ 07:00  --------------------------------------------------------  IN: 2400 mL / OUT: 550 mL / NET: 1850 mL    10-05-18 @ 07:01  -  10-05-18 @ 18:47  --------------------------------------------------------  IN: 1010 mL / OUT: 0 mL / NET: 1010 mL        atorvastatin 10 milliGRAM(s) Oral at bedtime  cefepime   IVPB      cyclobenzaprine 10 milliGRAM(s) Oral three times a day PRN  docusate sodium 100 milliGRAM(s) Oral daily  famotidine    Tablet 20 milliGRAM(s) Oral daily  HYDROmorphone PCA (1 mG/mL) 30 milliLiter(s) PCA Continuous PCA Continuous  HYDROmorphone PCA (1 mG/mL) Rescue Clinician Bolus 0.4 milliGRAM(s) IV Push every 1 hour PRN  losartan 25 milliGRAM(s) Oral daily  nafcillin  IVPB      nafcillin  IVPB 2 Gram(s) IV Intermittent every 4 hours  naloxone Injectable 0.1 milliGRAM(s) IV Push every 3 minutes PRN  ondansetron Injectable 4 milliGRAM(s) IV Push every 6 hours PRN  oxyCODONE    5 mG/acetaminophen 325 mG 1 Tablet(s) Oral every 4 hours PRN  senna 2 Tablet(s) Oral at bedtime  sodium chloride 0.9%. 1000 milliLiter(s) IV Continuous <Continuous>      Exam: NAD, AAOx3  PERRL. EOMI.  Neck FROM, nontender  Lungs clear b/l  Heart S1, S2. NSR.  Abd soft, NT/ND. +BS  Exts Pulses 2+ throughout  Back: C/D/I w/ dressing.  Neuro: CNs intact. 5/5 str x4 extremities. Sensation to LT intact. Following commands.        Assessment: 83y Male with CAD, HDL, HTN, h/o lumbar laminectomy 9/21/18 now with suspected wound infection with wound culture showing MSSA, now s/p lumbar wound washout POD#0      Plan:  -Continue antibiotics, appreciate ID follow-up  -PCA for pain management,  -OOB and PO as tolerated,  -Continue home medications as ordered,  -D/w Dr. Diaz

## 2018-10-05 NOTE — PROGRESS NOTE ADULT - SUBJECTIVE AND OBJECTIVE BOX
INFECTIOUS DISEASE FOLLOW UP NOTE    INTERVAL HPI: Overnight went down for MRI spine, kept NPO after midnight for surgery today. On interview this AM patient stated though his pain is better, his weakness is worse and he can no longer lift his left lower extremity against gravity.    BACK PAIN; WOUND INFECTION    Preoperative clearance  Wound infection      ROS:  CONSTITUTIONAL:  Chills, general malaise.   EYES:  Negative  blurry vision or double vision  CARDIOVASCULAR:  Negative for chest pain or palpitations  RESPIRATORY:  Negative for cough, wheezing, or SOB   GASTROINTESTINAL:  Negative for nausea, vomiting, diarrhea, constipation, or abdominal pain  GENITOURINARY:  Negative frequency, urgency or dysuria  NEUROLOGIC:  No headache, confusion, dizziness, lightheadedness    Allergies    No Known Allergies    Intolerances        ANTIBIOTICS/RELEVANT:  antimicrobials  cefepime   IVPB 2000 milliGRAM(s) IV Intermittent once  cefepime   IVPB      nafcillin  IVPB      nafcillin  IVPB 2 Gram(s) IV Intermittent every 4 hours    immunologic:    OTHER:  atorvastatin 10 milliGRAM(s) Oral at bedtime  cyclobenzaprine 10 milliGRAM(s) Oral three times a day PRN  docusate sodium 100 milliGRAM(s) Oral daily  famotidine    Tablet 20 milliGRAM(s) Oral daily  HYDROmorphone PCA (1 mG/mL) 30 milliLiter(s) PCA Continuous PCA Continuous  HYDROmorphone PCA (1 mG/mL) Rescue Clinician Bolus 0.4 milliGRAM(s) IV Push every 1 hour PRN  losartan 25 milliGRAM(s) Oral daily  naloxone Injectable 0.1 milliGRAM(s) IV Push every 3 minutes PRN  ondansetron Injectable 4 milliGRAM(s) IV Push every 6 hours PRN  oxyCODONE    5 mG/acetaminophen 325 mG 1 Tablet(s) Oral every 4 hours PRN  senna 2 Tablet(s) Oral at bedtime  sodium chloride 0.9%. 1000 milliLiter(s) IV Continuous <Continuous>      Objective:  Vital Signs Last 24 Hrs  T(C): 35.7 (05 Oct 2018 14:35), Max: 37.6 (05 Oct 2018 05:22)  T(F): 96.3 (05 Oct 2018 14:35), Max: 99.7 (05 Oct 2018 05:22)  HR: 72 (05 Oct 2018 15:30) (71 - 100)  BP: 90/52 (05 Oct 2018 15:30) (90/51 - 156/84)  BP(mean): 65 (05 Oct 2018 15:30) (64 - 95)  RR: 18 (05 Oct 2018 15:30) (18 - 20)  SpO2: 99% (05 Oct 2018 15:30) (93% - 99%)    PHYSICAL EXAM:  Constitutional:Well-developed, well nourishe  Eyes:ALICIA, EOMI  Ear/Nose/Throat: no oral lesion, no sinus tenderness on percussion	  Neck:no JVD, no lymphadenopathy, supple  Respiratory: CTA gabby  Cardiovascular: S1S2 RRR, no murmurs  Gastrointestinal:soft, (+) BS, no HSM  Extremities:no e/e/c. LLE unable to move antigravity. RLE unable to move antigravity. Sensation intact.         LABS:                        15.5   22.7  )-----------( 243      ( 04 Oct 2018 11:42 )             44.6     10-04    136  |  99  |  36<H>  ----------------------------<  96  5.2   |  24  |  1.30    Ca    9.4      04 Oct 2018 12:46  Phos  2.7     10-05  Mg     2.1     10-05    TPro  6.7  /  Alb  3.7  /  TBili  3.8<H>  /  DBili  x   /  AST  26  /  ALT  35  /  AlkPhos  68  10-04    PT/INR - ( 04 Oct 2018 11:42 )   PT: 13.2 sec;   INR: 1.19          PTT - ( 04 Oct 2018 11:42 )  PTT:39.0 sec  Urinalysis Basic - ( 04 Oct 2018 13:31 )    Color: Yellow / Appearance: Clear / SG: <=1.005 / pH: x  Gluc: x / Ketone: NEGATIVE  / Bili: Negative / Urobili: 1.0 E.U./dL   Blood: x / Protein: NEGATIVE mg/dL / Nitrite: NEGATIVE   Leuk Esterase: NEGATIVE / RBC: x / WBC x   Sq Epi: x / Non Sq Epi: x / Bacteria: x          MICROBIOLOGY:  Culture Results:   Moderate Staphylococcus aureus  Susceptibility to follow. (10-04 @ 21:54)      Culture - Surgical Swab (collected 05 Oct 2018 14:53)  Source: .Surgical Swab None  Gram Stain (05 Oct 2018 16:25):    Rare Gram Positive Cocci in Clusters    Few White blood cells    Culture - Surgical Swab (collected 05 Oct 2018 14:53)  Source: .Surgical Swab None  Gram Stain (05 Oct 2018 16:22):    Rare Gram positive cocci in pairs    Few White blood cells    Culture - Surgical Swab (collected 05 Oct 2018 14:53)  Source: .Surgical Swab None  Gram Stain (05 Oct 2018 16:16):    Few Gram positive cocci in pairs    Few Gram Positive Cocci in Clusters    Moderate White blood cells    Culture - Other (collected 04 Oct 2018 21:54)  Source: .Other Wound  Gram Stain (05 Oct 2018 13:41):    Few Gram Positive Cocci in Clusters    Numerous white blood cells  Preliminary Report (05 Oct 2018 13:50):    Moderate Staphylococcus aureus    Susceptibility to follow.    Culture - Other (collected 04 Oct 2018 14:48)  Source: Wound L spine  Gram Stain (05 Oct 2018 13:43):    Moderate Gram Positive Cocci in Clusters    Numerous white blood cells  Preliminary Report (05 Oct 2018 09:49):    Moderate Staphylococcus aureus    Susceptibility to follow.    Culture - Urine (collected 04 Oct 2018 14:30)  Source: .Urine Clean Catch (Midstream)  Final Report (05 Oct 2018 08:52):    Less than 10,000 cols/cc; Insignificant amount of growth.    Culture - Blood (collected 04 Oct 2018 14:17)  Source: .Blood Blood  Gram Stain (05 Oct 2018 09:34):    Aerobic and Anaerobic Bottle: Gram Positive Cocci in Clusters    Result called to and read back byCaterina Farrell RN 10/05/2018 09:32:37  Preliminary Report (05 Oct 2018 09:34):    Culture in progress    Culture - Blood (collected 04 Oct 2018 14:17)  Source: .Blood Blood  Gram Stain (05 Oct 2018 09:33):    Aerobic and Anaerobic Bottle: Gram Positive Cocci in Clusters    Result called to and read back byCaterina Farrell RN 10/05/2018 09:32:37    ***Blood Panel PCR results on this specimen are available    approximately 3 hours after the Gram stain result.***    Gram stain, PCR, and/or culture results may not always    correspond due to difference in methodologies.    ************************************************************    This PCR assay was performed using [employer].    The following targets are tested for: Enterococcus,    vancomycin resistant enterococci, Listeria monocytogenes,    coagulase negative staphylococci, S. aureus,    methicillin resistant S. aureus, Streptococcus agalactiae    (Group B), S. pneumoniae, S. pyogenes (Group A),    Acinetobacter baumannii, Enterobacter cloacae, E. coli,    Klebsiella oxytoca, K. pneumoniae, Proteus sp.,    Serratia marcescens, Haemophilus influenzae,    Neisseria meningitidis, Pseudomonas aeruginosa, Candida    albicans, C. glabrata, C krusei, C parapsilosis,    C. tropicalis and the KPC resistance gene.    "Due to technical problems, Proteus sp. will Not be reported as part of    the BCID panel until further notice"  Preliminary Report (05 Oct 2018 09:33):    Culture in progress  Organism: Blood Culture PCR (05 Oct 2018 09:53)  Organism: Blood Culture PCR (05 Oct 2018 09:53)            RADIOLOGY & ADDITIONAL STUDIES:    MR L SPINE W/WOUT CONTRAST IMPRESSION:     Fluid collection along the surgical incision and laminectomy bed shows   peripheral inflammatory enhancement and myositis.   Inflammatory/phlegmonous enhancement at the right L2-3 and L3-4 foramina.   Findings suggest infected collection. No epidural abscess.    Multilevel degenerative disc disease, as above.

## 2018-10-05 NOTE — BRIEF OPERATIVE NOTE - PROCEDURE
<<-----Click on this checkbox to enter Procedure Washout of wound of spine  10/05/2018    Active  [id number]

## 2018-10-05 NOTE — PROGRESS NOTE ADULT - SUBJECTIVE AND OBJECTIVE BOX
Cardiology Attending      Subjective Assessment: Mr. Dykes was seen and examined today. He has pmh of hypertension who is sp recent back sx. laminectomy. he was discharged recently but came back to er with severe lower back pain and unable to move his legs. IneEr he was found to have infection of laminectomy site. WbC counts elevated. He is started on iv antibiotics and planned to go to or for a wash out under general anesthesia.    he denies any chest pain or shrotness of breath today. sp echo is done for pre op clearance.  	          MEDICATIONS:  losartan 25 milliGRAM(s) Oral daily    piperacillin/tazobactam IVPB. 3.375 Gram(s) IV Intermittent every 8 hours  vancomycin  IVPB 1000 milliGRAM(s) IV Intermittent every 12 hours      acetaminophen   Tablet .. 650 milliGRAM(s) Oral every 6 hours PRN  cyclobenzaprine 10 milliGRAM(s) Oral three times a day PRN  oxyCODONE    5 mG/acetaminophen 325 mG 1 Tablet(s) Oral every 4 hours PRN    docusate sodium 100 milliGRAM(s) Oral daily  famotidine    Tablet 20 milliGRAM(s) Oral daily  senna 2 Tablet(s) Oral at bedtime    atorvastatin 10 milliGRAM(s) Oral at bedtime    sodium chloride 0.9%. 1000 milliLiter(s) IV Continuous <Continuous>      	    [PHYSICAL EXAM:  TELEMETRY:  T(C): 37 (10-05-18 @ 09:20), Max: 37.6 (10-05-18 @ 05:22)  HR: 89 (10-05-18 @ 09:20) (71 - 100)  BP: 122/72 (10-05-18 @ 09:20) (112/71 - 156/84)  RR: 18 (10-05-18 @ 09:20) (18 - 20)  SpO2: 98% (10-05-18 @ 09:20) (93% - 100%)  Wt(kg): --  I&O's Summary    04 Oct 2018 07:01  -  05 Oct 2018 07:00  --------------------------------------------------------  IN: 2400 mL / OUT: 550 mL / NET: 1850 mL        Rousseau:  Central/PICC/Mid Line:                                         Appearance: Normal	  HEENT:   Normal oral mucosa, PERRL, EOMI	  Neck: Supple, + JVD/ - JVD; Carotid Bruit   Cardiovascular: Normal S1 S2, No JVD, No murmurs,   Respiratory: Lungs clear to auscultation/Decreased Breath Sounds/No Rales, Rhonchi, Wheezing	  Gastrointestinal:  Soft, Non-tender, + BS	  Skin: No rashes, No ecchymoses, No cyanosis  Extremities: Normal range of motion, No clubbing, cyanosis or edema  Vascular: Peripheral pulses palpable 2+ bilaterally  Neurologic: Non-focal  Psychiatry: A & O x 3, Mood & affect appropriate      	    ECG:  	  RADIOLOGY:   DIAGNOSTIC TESTING:  [ ] Echocardiogram:  [ ]  Catheterization:  [ ] Stress Test:    [ ] JENIFER  OTHER: 	    LABS:	 	  CARDIAC MARKERS:                                  15.5   22.7  )-----------( 243      ( 04 Oct 2018 11:42 )             44.6     10-04    136  |  99  |  36<H>  ----------------------------<  96  5.2   |  24  |  1.30    Ca    9.4      04 Oct 2018 12:46  Phos  2.7     10-05  Mg     2.1     10-05    TPro  6.7  /  Alb  3.7  /  TBili  3.8<H>  /  DBili  x   /  AST  26  /  ALT  35  /  AlkPhos  68  10-04    TSH:   PT/INR - ( 04 Oct 2018 11:42 )   PT: 13.2 sec;   INR: 1.19          PTT - ( 04 Oct 2018 11:42 )  PTT:39.0 sec    ASSESSMENT/PLAN: 	    1 pre op clearance: for washout/ debridement and drainage of laminectomy wound infection  low risk surgery  no angina  ef normal  average ex capacity  low risk candidate for the surgery    2 hypertension  controlled  on benicar    3 ckd  mild creat 1.3  sec to htn  follow    4 wound infection/ leukocytosis  : on broad spectrum abx  follow neurosx recomm      DVT ppx Cardiology Attending      Subjective Assessment: 84yo left handed male with PMH CAd/ pci x 2HDL HTN s/p 9/21/2018 Lumbar lami and discharge with home care. Pt doing well until yesterday noted pain right flank and RLQ pain. Distal end of wound noted to be dehiscence and purulent drainage. Pt chills, afebrile. Denies urinary or bowel dysfunction. (04 Oct 2018 14:18)    Patient states he underwent lumbar laminectomy on 9/21 (L2-L3, L3-L4, L4-L5) with uncomplicated hospital course. His drains were removed POD 2 and he felt well, was able to ambulate unassisted and aside from some minor post-op pain had no complaints. He did note some clear drainage from his post-op wound for which his wife called his doctor who told them it was normal. Drainage has persisted and increased in quantity to the point where he is soaking through dressings/clothing. On the morning of 10/3, patient woke up with new onset right flank pain radiating to RLQ as well as new onset bilateral lower extremity weakness, R>L. He states he went to bed feeling well and woke up in the middle of the night with hematuria (bright red blood) which has since resolved. He also reports some urinary incontinence, states he had small volume of urine produced though he was unaware it was coming out, which is unusual for him. No dysuria, frequency, urgency otherwise. States he has had constipation, no diarrhea, cough, SOB, headaches. No trauma to his back, no quick or abnormal movements or lifting of heavy boxes. Denies paresthesias, saddle anesthesia, fecal incontinence. Reports chills.     patient seen and examined today    no chest pain or angina  back pain noted  on iv abx      PAST MEDICAL & SURGICAL HISTORY:  Asthma  BPH (benign prostatic hyperplasia)  Chronic bilateral low back pain with bilateral sciatica  Claudication  HLD (hyperlipidemia)  CAD (coronary artery disease): coronory stents x 2  HTN (hypertension)  H/O laminectomy  Status post lumbar laminectomy  History of appendectomy  History of partial thyroidectomy  History of percutaneous transluminal coronary angioplasty        MEDICATIONS:  losartan 25 milliGRAM(s) Oral daily    piperacillin/tazobactam IVPB. 3.375 Gram(s) IV Intermittent every 8 hours  vancomycin  IVPB 1000 milliGRAM(s) IV Intermittent every 12 hours      acetaminophen   Tablet .. 650 milliGRAM(s) Oral every 6 hours PRN  cyclobenzaprine 10 milliGRAM(s) Oral three times a day PRN  oxyCODONE    5 mG/acetaminophen 325 mG 1 Tablet(s) Oral every 4 hours PRN    docusate sodium 100 milliGRAM(s) Oral daily  famotidine    Tablet 20 milliGRAM(s) Oral daily  senna 2 Tablet(s) Oral at bedtime    atorvastatin 10 milliGRAM(s) Oral at bedtime    sodium chloride 0.9%. 1000 milliLiter(s) IV Continuous <Continuous>      	    [PHYSICAL EXAM:  TELEMETRY:  T(C): 37 (10-05-18 @ 09:20), Max: 37.6 (10-05-18 @ 05:22)  HR: 89 (10-05-18 @ 09:20) (71 - 100)  BP: 122/72 (10-05-18 @ 09:20) (112/71 - 156/84)  RR: 18 (10-05-18 @ 09:20) (18 - 20)  SpO2: 98% (10-05-18 @ 09:20) (93% - 100%)  Wt(kg): --  I&O's Summary    04 Oct 2018 07:01  -  05 Oct 2018 07:00  --------------------------------------------------------  IN: 2400 mL / OUT: 550 mL / NET: 1850 mL        Rousseau:  Central/PICC/Mid Line:                                         Appearance: Normal	  HEENT:   Normal oral mucosa, PERRL, EOMI	  Neck: Supple, + JVD/ - JVD; Carotid Bruit   Cardiovascular: Normal S1 S2, No JVD, No murmurs,   Respiratory: Lungs clear to auscultation/Decreased Breath Sounds/No Rales, Rhonchi, Wheezing	  Gastrointestinal:  Soft, Non-tender, + BS	  Skin: No rashes, No ecchymoses, No cyanosis  Extremities: Normal range of motion, No clubbing, cyanosis or edema  Vascular: Peripheral pulses palpable 2+ bilaterally  Neurologic: Non-focal  Psychiatry: A & O x 3, Mood & affect appropriate      	    ECG:  	  RADIOLOGY:   DIAGNOSTIC TESTING:  [ ] Echocardiogram:  [ ]  Catheterization:  [ ] Stress Test:    [ ] JENIFER  OTHER: 	    LABS:	 	  CARDIAC MARKERS:                                  15.5   22.7  )-----------( 243      ( 04 Oct 2018 11:42 )             44.6     10-04    136  |  99  |  36<H>  ----------------------------<  96  5.2   |  24  |  1.30    Ca    9.4      04 Oct 2018 12:46  Phos  2.7     10-05  Mg     2.1     10-05    TPro  6.7  /  Alb  3.7  /  TBili  3.8<H>  /  DBili  x   /  AST  26  /  ALT  35  /  AlkPhos  68  10-04    TSH:   PT/INR - ( 04 Oct 2018 11:42 )   PT: 13.2 sec;   INR: 1.19          PTT - ( 04 Oct 2018 11:42 )  PTT:39.0 sec    ASSESSMENT/PLAN: 	    1 pre op clearance: for washout/ debridement and drainage of laminectomy wound infection  low risk surgery  no angina  ef normal  average ex capacity  intermediate risk candidate for the surgery  no invasive cardiac work up needed    2 hypertension/ CAD  no angina  on asa 81  controlled  on benicar    3 ckd  mild creat 1.3  sec to htn  follow    4 wound infection/ leukocytosis  : on broad spectrum abx  follow neurosx recomm      DVT ppx

## 2018-10-05 NOTE — PROGRESS NOTE ADULT - ASSESSMENT
Patient is an 83 year old man with PMH CAD, HLD, HTN, lumbar radiculopathy for which he is s/p lumbar laminectomy performed 9/21. While post-op course initially uncomplicated, patient now presents with drainage from his post-op wound, WBC 22, new onset bilateral lower extremity weakness R>L and severe back pain. CT L-spine remarkable for partially rim-enhancing collection at surgical bed. Patient with post-op wound infection and neuro deficits now s/p washout in OR with wound cultures growing MSSA, bacteremia with MSSA.     Recommendations are as follows:    1. Vancomycin discontinued, c/w nafcillin 2g IV q4h for MSSA coverage.   2. Continue cefepime 2g IV q12h for now.   3. Repeat blood culture today for surveillance.     ID will continue to follow with you.

## 2018-10-06 DIAGNOSIS — N17.9 ACUTE KIDNEY FAILURE, UNSPECIFIED: ICD-10-CM

## 2018-10-06 LAB
-  CEFAZOLIN: SIGNIFICANT CHANGE UP
-  CEFAZOLIN: SIGNIFICANT CHANGE UP
-  CLINDAMYCIN: SIGNIFICANT CHANGE UP
-  CLINDAMYCIN: SIGNIFICANT CHANGE UP
-  ERYTHROMYCIN: SIGNIFICANT CHANGE UP
-  ERYTHROMYCIN: SIGNIFICANT CHANGE UP
-  LINEZOLID: SIGNIFICANT CHANGE UP
-  LINEZOLID: SIGNIFICANT CHANGE UP
-  OXACILLIN: SIGNIFICANT CHANGE UP
-  OXACILLIN: SIGNIFICANT CHANGE UP
-  PENICILLIN: SIGNIFICANT CHANGE UP
-  PENICILLIN: SIGNIFICANT CHANGE UP
-  RIFAMPIN: SIGNIFICANT CHANGE UP
-  RIFAMPIN: SIGNIFICANT CHANGE UP
-  TRIMETHOPRIM/SULFAMETHOXAZOLE: SIGNIFICANT CHANGE UP
-  TRIMETHOPRIM/SULFAMETHOXAZOLE: SIGNIFICANT CHANGE UP
-  VANCOMYCIN: SIGNIFICANT CHANGE UP
-  VANCOMYCIN: SIGNIFICANT CHANGE UP
ALBUMIN SERPL ELPH-MCNC: 3 G/DL — LOW (ref 3.3–5)
ALP SERPL-CCNC: 94 U/L — SIGNIFICANT CHANGE UP (ref 40–120)
ALT FLD-CCNC: 41 U/L — SIGNIFICANT CHANGE UP (ref 10–45)
ANION GAP SERPL CALC-SCNC: 15 MMOL/L — SIGNIFICANT CHANGE UP (ref 5–17)
AST SERPL-CCNC: 43 U/L — HIGH (ref 10–40)
BILIRUB SERPL-MCNC: 1.8 MG/DL — HIGH (ref 0.2–1.2)
BUN SERPL-MCNC: 28 MG/DL — HIGH (ref 7–23)
CALCIUM SERPL-MCNC: 8.5 MG/DL — SIGNIFICANT CHANGE UP (ref 8.4–10.5)
CHLORIDE SERPL-SCNC: 96 MMOL/L — SIGNIFICANT CHANGE UP (ref 96–108)
CO2 SERPL-SCNC: 23 MMOL/L — SIGNIFICANT CHANGE UP (ref 22–31)
CREAT SERPL-MCNC: 1.44 MG/DL — HIGH (ref 0.5–1.3)
CRP SERPL-MCNC: 14.74 MG/DL — HIGH (ref 0–0.4)
CULTURE RESULTS: SIGNIFICANT CHANGE UP
CULTURE RESULTS: SIGNIFICANT CHANGE UP
ERYTHROCYTE [SEDIMENTATION RATE] IN BLOOD: 35 MM/HR — HIGH
GLUCOSE SERPL-MCNC: 116 MG/DL — HIGH (ref 70–99)
GRAM STN FLD: SIGNIFICANT CHANGE UP
HCT VFR BLD CALC: 42.2 % — SIGNIFICANT CHANGE UP (ref 39–50)
HGB BLD-MCNC: 14.3 G/DL — SIGNIFICANT CHANGE UP (ref 13–17)
MCHC RBC-ENTMCNC: 31.2 PG — SIGNIFICANT CHANGE UP (ref 27–34)
MCHC RBC-ENTMCNC: 33.9 G/DL — SIGNIFICANT CHANGE UP (ref 32–36)
MCV RBC AUTO: 92.1 FL — SIGNIFICANT CHANGE UP (ref 80–100)
METHOD TYPE: SIGNIFICANT CHANGE UP
METHOD TYPE: SIGNIFICANT CHANGE UP
ORGANISM # SPEC MICROSCOPIC CNT: SIGNIFICANT CHANGE UP
PLATELET # BLD AUTO: 212 K/UL — SIGNIFICANT CHANGE UP (ref 150–400)
POTASSIUM SERPL-MCNC: 4.2 MMOL/L — SIGNIFICANT CHANGE UP (ref 3.5–5.3)
POTASSIUM SERPL-SCNC: 4.2 MMOL/L — SIGNIFICANT CHANGE UP (ref 3.5–5.3)
PROT SERPL-MCNC: 6 G/DL — SIGNIFICANT CHANGE UP (ref 6–8.3)
RBC # BLD: 4.58 M/UL — SIGNIFICANT CHANGE UP (ref 4.2–5.8)
RBC # FLD: 14.1 % — SIGNIFICANT CHANGE UP (ref 10.3–16.9)
SODIUM SERPL-SCNC: 134 MMOL/L — LOW (ref 135–145)
SPECIMEN SOURCE: SIGNIFICANT CHANGE UP
SPECIMEN SOURCE: SIGNIFICANT CHANGE UP
WBC # BLD: 7 K/UL — SIGNIFICANT CHANGE UP (ref 3.8–10.5)
WBC # FLD AUTO: 7 K/UL — SIGNIFICANT CHANGE UP (ref 3.8–10.5)

## 2018-10-06 PROCEDURE — 99233 SBSQ HOSP IP/OBS HIGH 50: CPT | Mod: GC

## 2018-10-06 PROCEDURE — 99232 SBSQ HOSP IP/OBS MODERATE 35: CPT

## 2018-10-06 PROCEDURE — 71045 X-RAY EXAM CHEST 1 VIEW: CPT | Mod: 26

## 2018-10-06 RX ORDER — DOCUSATE SODIUM 100 MG
100 CAPSULE ORAL THREE TIMES A DAY
Qty: 0 | Refills: 0 | Status: DISCONTINUED | OUTPATIENT
Start: 2018-10-06 | End: 2018-10-12

## 2018-10-06 RX ORDER — ENOXAPARIN SODIUM 100 MG/ML
40 INJECTION SUBCUTANEOUS AT BEDTIME
Qty: 0 | Refills: 0 | Status: DISCONTINUED | OUTPATIENT
Start: 2018-10-06 | End: 2018-10-08

## 2018-10-06 RX ORDER — DIAZEPAM 5 MG
5 TABLET ORAL EVERY 6 HOURS
Qty: 0 | Refills: 0 | Status: DISCONTINUED | OUTPATIENT
Start: 2018-10-06 | End: 2018-10-07

## 2018-10-06 RX ORDER — SODIUM CHLORIDE 9 MG/ML
2 INJECTION INTRAMUSCULAR; INTRAVENOUS; SUBCUTANEOUS EVERY 6 HOURS
Qty: 0 | Refills: 0 | Status: DISCONTINUED | OUTPATIENT
Start: 2018-10-06 | End: 2018-10-11

## 2018-10-06 RX ORDER — ACETAMINOPHEN 500 MG
650 TABLET ORAL EVERY 6 HOURS
Qty: 0 | Refills: 0 | Status: DISCONTINUED | OUTPATIENT
Start: 2018-10-06 | End: 2018-10-12

## 2018-10-06 RX ORDER — METOCLOPRAMIDE HCL 10 MG
10 TABLET ORAL EVERY 6 HOURS
Qty: 0 | Refills: 0 | Status: DISCONTINUED | OUTPATIENT
Start: 2018-10-06 | End: 2018-10-12

## 2018-10-06 RX ORDER — OXYCODONE AND ACETAMINOPHEN 5; 325 MG/1; MG/1
2 TABLET ORAL EVERY 4 HOURS
Qty: 0 | Refills: 0 | Status: DISCONTINUED | OUTPATIENT
Start: 2018-10-06 | End: 2018-10-06

## 2018-10-06 RX ORDER — HYDROMORPHONE HYDROCHLORIDE 2 MG/ML
1 INJECTION INTRAMUSCULAR; INTRAVENOUS; SUBCUTANEOUS EVERY 4 HOURS
Qty: 0 | Refills: 0 | Status: DISCONTINUED | OUTPATIENT
Start: 2018-10-06 | End: 2018-10-07

## 2018-10-06 RX ORDER — OXYCODONE AND ACETAMINOPHEN 5; 325 MG/1; MG/1
2 TABLET ORAL EVERY 4 HOURS
Qty: 0 | Refills: 0 | Status: DISCONTINUED | OUTPATIENT
Start: 2018-10-06 | End: 2018-10-07

## 2018-10-06 RX ORDER — SODIUM CHLORIDE 9 MG/ML
1000 INJECTION INTRAMUSCULAR; INTRAVENOUS; SUBCUTANEOUS
Qty: 0 | Refills: 0 | Status: DISCONTINUED | OUTPATIENT
Start: 2018-10-06 | End: 2018-10-08

## 2018-10-06 RX ADMIN — SODIUM CHLORIDE 125 MILLILITER(S): 9 INJECTION INTRAMUSCULAR; INTRAVENOUS; SUBCUTANEOUS at 00:51

## 2018-10-06 RX ADMIN — Medication 5 MILLIGRAM(S): at 17:05

## 2018-10-06 RX ADMIN — SENNA PLUS 2 TABLET(S): 8.6 TABLET ORAL at 21:34

## 2018-10-06 RX ADMIN — Medication 100 MILLIGRAM(S): at 09:59

## 2018-10-06 RX ADMIN — FAMOTIDINE 20 MILLIGRAM(S): 10 INJECTION INTRAVENOUS at 09:59

## 2018-10-06 RX ADMIN — OXYCODONE AND ACETAMINOPHEN 2 TABLET(S): 5; 325 TABLET ORAL at 12:17

## 2018-10-06 RX ADMIN — Medication 10 MILLIGRAM(S): at 12:17

## 2018-10-06 RX ADMIN — LOSARTAN POTASSIUM 25 MILLIGRAM(S): 100 TABLET, FILM COATED ORAL at 05:43

## 2018-10-06 RX ADMIN — NAFCILLIN 200 GRAM(S): 10 INJECTION, POWDER, FOR SOLUTION INTRAVENOUS at 13:39

## 2018-10-06 RX ADMIN — Medication 650 MILLIGRAM(S): at 06:45

## 2018-10-06 RX ADMIN — NAFCILLIN 200 GRAM(S): 10 INJECTION, POWDER, FOR SOLUTION INTRAVENOUS at 17:42

## 2018-10-06 RX ADMIN — OXYCODONE AND ACETAMINOPHEN 2 TABLET(S): 5; 325 TABLET ORAL at 12:47

## 2018-10-06 RX ADMIN — SODIUM CHLORIDE 2 GRAM(S): 9 INJECTION INTRAMUSCULAR; INTRAVENOUS; SUBCUTANEOUS at 13:49

## 2018-10-06 RX ADMIN — Medication 5 MILLIGRAM(S): at 12:23

## 2018-10-06 RX ADMIN — ATORVASTATIN CALCIUM 10 MILLIGRAM(S): 80 TABLET, FILM COATED ORAL at 21:34

## 2018-10-06 RX ADMIN — SODIUM CHLORIDE 2 GRAM(S): 9 INJECTION INTRAMUSCULAR; INTRAVENOUS; SUBCUTANEOUS at 17:05

## 2018-10-06 RX ADMIN — NAFCILLIN 200 GRAM(S): 10 INJECTION, POWDER, FOR SOLUTION INTRAVENOUS at 21:34

## 2018-10-06 RX ADMIN — NAFCILLIN 200 GRAM(S): 10 INJECTION, POWDER, FOR SOLUTION INTRAVENOUS at 10:00

## 2018-10-06 RX ADMIN — SODIUM CHLORIDE 75 MILLILITER(S): 9 INJECTION INTRAMUSCULAR; INTRAVENOUS; SUBCUTANEOUS at 19:56

## 2018-10-06 RX ADMIN — Medication 100 MILLIGRAM(S): at 21:34

## 2018-10-06 RX ADMIN — Medication 650 MILLIGRAM(S): at 05:42

## 2018-10-06 RX ADMIN — NAFCILLIN 200 GRAM(S): 10 INJECTION, POWDER, FOR SOLUTION INTRAVENOUS at 06:49

## 2018-10-06 RX ADMIN — NAFCILLIN 200 GRAM(S): 10 INJECTION, POWDER, FOR SOLUTION INTRAVENOUS at 02:50

## 2018-10-06 RX ADMIN — CEFEPIME 100 MILLIGRAM(S): 1 INJECTION, POWDER, FOR SOLUTION INTRAMUSCULAR; INTRAVENOUS at 17:05

## 2018-10-06 RX ADMIN — OXYCODONE AND ACETAMINOPHEN 1 TABLET(S): 5; 325 TABLET ORAL at 19:59

## 2018-10-06 RX ADMIN — Medication 100 MILLIGRAM(S): at 13:48

## 2018-10-06 RX ADMIN — OXYCODONE AND ACETAMINOPHEN 1 TABLET(S): 5; 325 TABLET ORAL at 20:45

## 2018-10-06 RX ADMIN — CEFEPIME 100 MILLIGRAM(S): 1 INJECTION, POWDER, FOR SOLUTION INTRAMUSCULAR; INTRAVENOUS at 05:03

## 2018-10-06 NOTE — PROGRESS NOTE ADULT - SUBJECTIVE AND OBJECTIVE BOX
HPI:  84yo left handed male with PMH CAD HDL HTN s/p 9/21/2018 Lumbar lami and discharge with home care. Pt doing well until yesterday noted pain right flank and RLQ pain. Distal end of wound noted to be dehiscence and purulent drainage. Pt chills, afebrile. Denies urinary or bowel dysfunction. (04 Oct 2018 14:18)    OVERNIGHT EVENTS: No acute events overnight. F/u blood cultures    10/5: s/p lumbar wound washout  10/6: no acute events overnight    Vital Signs Last 24 Hrs  T(C): 35.7 (05 Oct 2018 23:02), Max: 37.6 (05 Oct 2018 05:22)  T(F): 96.2 (05 Oct 2018 23:02), Max: 99.7 (05 Oct 2018 05:22)  HR: 84 (06 Oct 2018 00:39) (65 - 95)  BP: 115/57 (06 Oct 2018 00:39) (90/51 - 137/64)  BP(mean): 71 (05 Oct 2018 16:30) (64 - 78)  RR: 18 (06 Oct 2018 00:39) (18 - 19)  SpO2: 98% (06 Oct 2018 00:39) (93% - 100%)    I&O's Detail    04 Oct 2018 07:01  -  05 Oct 2018 07:00  --------------------------------------------------------  IN:    Oral Fluid: 200 mL    sodium chloride 0.9%: 1250 mL    Solution: 950 mL  Total IN: 2400 mL    OUT:    Voided: 550 mL  Total OUT: 550 mL    Total NET: 1850 mL      05 Oct 2018 07:01  -  06 Oct 2018 01:05  --------------------------------------------------------  IN:    Other: 910 mL    sodium chloride 0.9%.: 625 mL    Solution: 100 mL  Total IN: 1635 mL    OUT:    Bulb: 55 mL    Voided: 450 mL  Total OUT: 505 mL    Total NET: 1130 mL        I&O's Summary    04 Oct 2018 07:01  -  05 Oct 2018 07:00  --------------------------------------------------------  IN: 2400 mL / OUT: 550 mL / NET: 1850 mL    05 Oct 2018 07:01  -  06 Oct 2018 01:05  --------------------------------------------------------  IN: 1635 mL / OUT: 505 mL / NET: 1130 mL        PHYSICAL EXAM:  Gen: laying in hospital bed, NAD  Neurological: AA+Ox3, opens eyes spontaneously, following commands  CN II-XII PERRL, EOMI  Motor exam: MAEx4, 5/5 strength throughout  Cardiovascular: regular rate and rhythm  Respiratory: clear to ausculation  Gastrointestinal: soft, nontender, nondistended  Incision/Wound: C/D/I    TUBES/LINES:  [] CVC  [] A-line  [] Lumbar Drain  [] Ventriculostomy  [x] Other: KEITH drain    DIET:  [] NPO  [x] Mechanical  [] Tube feeds    LABS:                        15.5   22.7  )-----------( 243      ( 04 Oct 2018 11:42 )             44.6     10-04    136  |  99  |  36<H>  ----------------------------<  96  5.2   |  24  |  1.30    Ca    9.4      04 Oct 2018 12:46  Phos  2.7     10-05  Mg     2.1     10-05    TPro  6.7  /  Alb  3.7  /  TBili  3.8<H>  /  DBili  x   /  AST  26  /  ALT  35  /  AlkPhos  68  10-04    PT/INR - ( 04 Oct 2018 11:42 )   PT: 13.2 sec;   INR: 1.19          PTT - ( 04 Oct 2018 11:42 )  PTT:39.0 sec  Urinalysis Basic - ( 04 Oct 2018 13:31 )    Color: Yellow / Appearance: Clear / SG: <=1.005 / pH: x  Gluc: x / Ketone: NEGATIVE  / Bili: Negative / Urobili: 1.0 E.U./dL   Blood: x / Protein: NEGATIVE mg/dL / Nitrite: NEGATIVE   Leuk Esterase: NEGATIVE / RBC: x / WBC x   Sq Epi: x / Non Sq Epi: x / Bacteria: x          CAPILLARY BLOOD GLUCOSE          Drug Levels: [] N/A    CSF Analysis: [] N/A      Allergies    No Known Allergies    Intolerances      MEDICATIONS:  Antibiotics:  cefepime   IVPB 2000 milliGRAM(s) IV Intermittent every 12 hours  cefepime   IVPB      nafcillin  IVPB      nafcillin  IVPB 2 Gram(s) IV Intermittent every 4 hours    Neuro:  cyclobenzaprine 10 milliGRAM(s) Oral three times a day PRN  HYDROmorphone PCA (1 mG/mL) 30 milliLiter(s) PCA Continuous PCA Continuous  HYDROmorphone PCA (1 mG/mL) Rescue Clinician Bolus 0.4 milliGRAM(s) IV Push every 1 hour PRN  ondansetron Injectable 4 milliGRAM(s) IV Push every 6 hours PRN  oxyCODONE    5 mG/acetaminophen 325 mG 1 Tablet(s) Oral every 4 hours PRN    Anticoagulation:    OTHER:  atorvastatin 10 milliGRAM(s) Oral at bedtime  docusate sodium 100 milliGRAM(s) Oral daily  famotidine    Tablet 20 milliGRAM(s) Oral daily  losartan 25 milliGRAM(s) Oral daily  naloxone Injectable 0.1 milliGRAM(s) IV Push every 3 minutes PRN  senna 2 Tablet(s) Oral at bedtime    IVF:  sodium chloride 0.9%. 1000 milliLiter(s) IV Continuous <Continuous>    CULTURES:  Culture Results:   Moderate Staphylococcus aureus  Susceptibility to follow. (10-04 @ 21:54)  Culture Results:   Moderate Staphylococcus aureus  Susceptibility to follow. (10-04 @ 14:48)    RADIOLOGY & ADDITIONAL TESTS:    ASSESSMENT:  83y Male s/p lumbar laminectomy 9/21, now with suspected wound infection s/p lumbar wound washout POD #1     PLAN:  - neuro checks  - vitals checks  - pain control  - regular diet  - bowel regimen: colace, senna  - cont Nafcillin, Cefepime for MSSA bacteremia and wound infection, as per ID  - ID following, recs appreciated  - f/u blood cultures  - DVT PROPHYLAXIS: [x] Venodynes [x] Heparin/Lovenox  - PT/OT/OOB      DISPOSITION: regional status, dispo pending    d/w Dr. Diaz HPI:  82yo left handed male with PMH CAD HDL HTN s/p 9/21/2018 Lumbar lami and discharge with home care. Pt doing well until yesterday noted pain right flank and RLQ pain. Distal end of wound noted to be dehiscence and purulent drainage. Pt chills, afebrile. Denies urinary or bowel dysfunction. (04 Oct 2018 14:18)    OVERNIGHT EVENTS: No acute events overnight. F/u blood cultures    10/5: s/p lumbar wound washout  10/6: febrile to 102 overnight. Creatinine bump today 1.4 from 1.1, cont IVF @125, sputum cx sent, keep KEITH drain in place o/p 75cc/12 hours and 85cc/24 hours, started on salt tabs for Na 134, started on percs and dilaudid PCA d/c'd. OR and wound cx growing MSSA, cont naficillin and cefepime per ID     Vital Signs Last 24 Hrs  T(C): 35.7 (05 Oct 2018 23:02), Max: 37.6 (05 Oct 2018 05:22)  T(F): 96.2 (05 Oct 2018 23:02), Max: 99.7 (05 Oct 2018 05:22)  HR: 84 (06 Oct 2018 00:39) (65 - 95)  BP: 115/57 (06 Oct 2018 00:39) (90/51 - 137/64)  BP(mean): 71 (05 Oct 2018 16:30) (64 - 78)  RR: 18 (06 Oct 2018 00:39) (18 - 19)  SpO2: 98% (06 Oct 2018 00:39) (93% - 100%)    I&O's Detail    04 Oct 2018 07:01  -  05 Oct 2018 07:00  --------------------------------------------------------  IN:    Oral Fluid: 200 mL    sodium chloride 0.9%: 1250 mL    Solution: 950 mL  Total IN: 2400 mL    OUT:    Voided: 550 mL  Total OUT: 550 mL    Total NET: 1850 mL      05 Oct 2018 07:01  -  06 Oct 2018 01:05  --------------------------------------------------------  IN:    Other: 910 mL    sodium chloride 0.9%.: 625 mL    Solution: 100 mL  Total IN: 1635 mL    OUT:    Bulb: 55 mL    Voided: 450 mL  Total OUT: 505 mL    Total NET: 1130 mL        I&O's Summary    04 Oct 2018 07:01  -  05 Oct 2018 07:00  --------------------------------------------------------  IN: 2400 mL / OUT: 550 mL / NET: 1850 mL    05 Oct 2018 07:01  -  06 Oct 2018 01:05  --------------------------------------------------------  IN: 1635 mL / OUT: 505 mL / NET: 1130 mL        PHYSICAL EXAM:  Gen: laying in hospital bed, NAD  Neurological: AA+Ox3, opens eyes spontaneously, following commands  CN II-XII PERRL, EOMI  Motor exam: MAEx4, 5/5 strength throughout  Cardiovascular: regular rate and rhythm  Respiratory: clear to ausculation  Gastrointestinal: soft, nontender, nondistended  Incision/Wound: C/D/I    TUBES/LINES:  [] CVC  [] A-line  [] Lumbar Drain  [] Ventriculostomy  [x] Other: KEITH drain    DIET:  [] NPO  [x] Mechanical  [] Tube feeds    LABS:                        15.5   22.7  )-----------( 243      ( 04 Oct 2018 11:42 )             44.6     10-04    136  |  99  |  36<H>  ----------------------------<  96  5.2   |  24  |  1.30    Ca    9.4      04 Oct 2018 12:46  Phos  2.7     10-05  Mg     2.1     10-05    TPro  6.7  /  Alb  3.7  /  TBili  3.8<H>  /  DBili  x   /  AST  26  /  ALT  35  /  AlkPhos  68  10-04    PT/INR - ( 04 Oct 2018 11:42 )   PT: 13.2 sec;   INR: 1.19          PTT - ( 04 Oct 2018 11:42 )  PTT:39.0 sec  Urinalysis Basic - ( 04 Oct 2018 13:31 )    Color: Yellow / Appearance: Clear / SG: <=1.005 / pH: x  Gluc: x / Ketone: NEGATIVE  / Bili: Negative / Urobili: 1.0 E.U./dL   Blood: x / Protein: NEGATIVE mg/dL / Nitrite: NEGATIVE   Leuk Esterase: NEGATIVE / RBC: x / WBC x   Sq Epi: x / Non Sq Epi: x / Bacteria: x          CAPILLARY BLOOD GLUCOSE          Drug Levels: [] N/A    CSF Analysis: [] N/A      Allergies    No Known Allergies    Intolerances      MEDICATIONS:  Antibiotics:  cefepime   IVPB 2000 milliGRAM(s) IV Intermittent every 12 hours  cefepime   IVPB      nafcillin  IVPB      nafcillin  IVPB 2 Gram(s) IV Intermittent every 4 hours    Neuro:  cyclobenzaprine 10 milliGRAM(s) Oral three times a day PRN  HYDROmorphone PCA (1 mG/mL) 30 milliLiter(s) PCA Continuous PCA Continuous  HYDROmorphone PCA (1 mG/mL) Rescue Clinician Bolus 0.4 milliGRAM(s) IV Push every 1 hour PRN  ondansetron Injectable 4 milliGRAM(s) IV Push every 6 hours PRN  oxyCODONE    5 mG/acetaminophen 325 mG 1 Tablet(s) Oral every 4 hours PRN    Anticoagulation:    OTHER:  atorvastatin 10 milliGRAM(s) Oral at bedtime  docusate sodium 100 milliGRAM(s) Oral daily  famotidine    Tablet 20 milliGRAM(s) Oral daily  losartan 25 milliGRAM(s) Oral daily  naloxone Injectable 0.1 milliGRAM(s) IV Push every 3 minutes PRN  senna 2 Tablet(s) Oral at bedtime    IVF:  sodium chloride 0.9%. 1000 milliLiter(s) IV Continuous <Continuous>    CULTURES:  Culture Results:   Moderate Staphylococcus aureus  Susceptibility to follow. (10-04 @ 21:54)  Culture Results:   Moderate Staphylococcus aureus  Susceptibility to follow. (10-04 @ 14:48)    RADIOLOGY & ADDITIONAL TESTS:    ASSESSMENT:  83y Male s/p lumbar laminectomy 9/21, now with suspected wound infection s/p lumbar wound washout POD #1     PLAN:  - neuro checks  - vitals checks  - pain control - d/c dilaudud PCA, start percocet w/ IVP dilaudud for breakthru pain, started on valium   - regular diet  - bowel regimen: add dulcolax PO and suppository, drink prune juice, cont. colace, senna  - cont Nafcillin, Cefepime for MSSA bacteremia and wound infection, as per ID  - ID following, recs appreciated  - f/u repeat blood cultures  - Started on NaCl tabs 2q6 for Na 134 - will trend  - Trend Cr and continue NS @125  - DVT PROPHYLAXIS: [x] Venodynes [] Heparin/Lovenox - POD #1  - PT/OT/OOB      DISPOSITION: regional status, dispo pending    d/w Dr. Diaz

## 2018-10-06 NOTE — PHYSICAL THERAPY INITIAL EVALUATION ADULT - PERTINENT HX OF CURRENT PROBLEM, REHAB EVAL
84yo left handed male with PMH CAD HDL HTN s/p 9/21/2018 Lumbar lami and discharge with home care. Pt doing well until yesterday noted pain right flank and RLQ pain. Distal end of wound noted to be dehiscence and purulent drainage. Pt chills, afebrile. Denies urinary or bowel dysfunction.. Now s/p Washout of wound of spine Washout of wound of spine

## 2018-10-06 NOTE — PHYSICAL THERAPY INITIAL EVALUATION ADULT - ADDITIONAL COMMENTS
Patient lives with spouse in private house with no steps to enter (no steps inside). Ambulates without any Assistive Device at baseline, however owns RW and SC. Denies falls.

## 2018-10-06 NOTE — PHYSICAL THERAPY INITIAL EVALUATION ADULT - GAIT DEVIATIONS NOTED, PT EVAL
decreased step length/decreased swing-to-stance ratio/decreased velocity of limb motion/decreased stride length/increased stride width/increased time in double stance

## 2018-10-06 NOTE — PHYSICAL THERAPY INITIAL EVALUATION ADULT - MODALITIES TREATMENT COMMENTS
Supine, bilateral LEs: glute sets, quad sets, heel slides, ankle pumps (all through full range, 1 set, 10 reps). Patient tolerated therex well. Educated patient to perform therex regimen 3-5x/day, patient verbalized understanding;

## 2018-10-06 NOTE — PHYSICAL THERAPY INITIAL EVALUATION ADULT - DIAGNOSIS, PT EVAL
4D: impaired joint mobility, motor function, muscle performance, and range of motion associated with connective tissue dysfunction

## 2018-10-06 NOTE — PROGRESS NOTE ADULT - SUBJECTIVE AND OBJECTIVE BOX
INTERVAL HPI/OVERNIGHT EVENTS:  Was febrile overnight (Tm 102).  Has severe back pain, ongoing LE weakness.    CONSTITUTIONAL:  Felt fever  EYES:  No photophobia or visual changes  CARDIOVASCULAR:  No chest pain  RESPIRATORY:  No cough, wheezing, or SOB   GASTROINTESTINAL:  No nausea, vomiting, diarrhea, constipation, or abdominal pain  GENITOURINARY:  No frequency, urgency, dysuria or hematuria  NEUROLOGIC:  No headache, lightheadedness      ANTIBIOTICS/RELEVANT:    Nafcillin 2 g IV q4h (10/5-present)  Cefepime 2 g IV q12h (10/5-present)    Vancomycin 10/4-10/5  Pip-tazo 10/4-10/5      Vital Signs Last 24 Hrs  T(C): 37.9 (06 Oct 2018 06:54), Max: 38.9 (06 Oct 2018 05:12)  T(F): 100.2 (06 Oct 2018 06:54), Max: 102 (06 Oct 2018 05:12)  HR: 90 (06 Oct 2018 12:30) (70 - 90)  BP: 139/70 (06 Oct 2018 12:30) (115/57 - 139/70)  BP(mean): --  RR: 18 (06 Oct 2018 05:12) (18 - 18)  SpO2: 92% (06 Oct 2018 12:30) (92% - 100%)    PHYSICAL EXAM:  Constitutional:  Ill appearing  Eyes:  Sclerae anicteric, conjunctivae clear, pupils miotic  Ear/Nose/Throat:  No nasal exudate or sinus tenderness;  No buccal mucosal lesions, no pharyngeal erythema or exudate	  Neck:  Supple, no adenopathy  Respiratory:  Clear bilaterally  Cardiovascular:  RRR, distant S1S2, no murmur appreciated  Gastrointestinal:  Symmetric, normoactive BS, soft, NT, no masses, guarding or rebound.  No HSM  Extremities:  No edema.  Unable to raise      LABS:                        14.3   7.0   )-----------( 212      ( 06 Oct 2018 08:36 )             42.2         10-06    134<L>  |  96  |  28<H>  ----------------------------<  116<H>  4.2   |  23  |  1.44<H>    Ca    8.5      06 Oct 2018 08:36  Phos  2.7     10-05  Mg     2.1     10-05    TPro  6.0  /  Alb  3.0<L>  /  TBili  1.8<H>  /  DBili  x   /  AST  43<H>  /  ALT  41  /  AlkPhos  94  10-06          MICROBIOLOGY:  Blood cultures:  10/4 X 2 - MSSA (4/4 bottles);  10/6 X 2 - NGTD    10/4 - Wound L spine:  numerous WBCs, mod GPCCL; mod MSSA  Wound other: numerous WBCs, few GPCCL; mod MSSA    OR cultures 10/5:  Surgical swabs X 3 - Staph aureus    RADIOLOGY & ADDITIONAL STUDIES:  CXR 10/6 (my review) - apical lordotic, elevated right hemidiaphragm INTERVAL HPI/OVERNIGHT EVENTS:  Was febrile overnight (Tm 102).  Has severe back pain, ongoing LE weakness.  Has hiccups    CONSTITUTIONAL:  Felt fever, had rigors  EYES:  No photophobia or visual changes  CARDIOVASCULAR:  No chest pain  RESPIRATORY:  Has had nonproductive cough since last night.  No sputum, wheezing, or SOB   GASTROINTESTINAL:  No nausea, vomiting, diarrhea, constipation, or abdominal pain  GENITOURINARY:  No frequency, urgency, dysuria or hematuria  NEUROLOGIC:  No headache, lightheadedness      ANTIBIOTICS/RELEVANT:    Nafcillin 2 g IV q4h (10/5-present)  Cefepime 2 g IV q12h (10/5-present)    Vancomycin 10/4-10/5  Pip-tazo 10/4-10/5      Vital Signs Last 24 Hrs  T(C): 37.9 (06 Oct 2018 06:54), Max: 38.9 (06 Oct 2018 05:12)  T(F): 100.2 (06 Oct 2018 06:54), Max: 102 (06 Oct 2018 05:12)  HR: 90 (06 Oct 2018 12:30) (70 - 90)  BP: 139/70 (06 Oct 2018 12:30) (115/57 - 139/70)  BP(mean): --  RR: 18 (06 Oct 2018 05:12) (18 - 18)  SpO2: 92% (06 Oct 2018 12:30) (92% - 100%)    PHYSICAL EXAM:  Constitutional:  Ill appearing  Eyes:  Sclerae anicteric, conjunctivae clear, pupils miotic  Ear/Nose/Throat:  No nasal exudate or sinus tenderness;  No buccal mucosal lesions, no pharyngeal erythema or exudate	  Neck:  Supple, no adenopathy  Respiratory:  Clear bilaterally  Cardiovascular:  RRR, distant S1S2, no murmur appreciated  Gastrointestinal:  Symmetric, normoactive BS, soft, NT, no masses, guarding or rebound.  No HSM  Extremities:  No edema.  LEs strength 4/5 bilaterally      LABS:                        14.3   7.0   )-----------( 212      ( 06 Oct 2018 08:36 )             42.2         10-06    134<L>  |  96  |  28<H>  ----------------------------<  116<H>  4.2   |  23  |  1.44<H>    Ca    8.5      06 Oct 2018 08:36  Phos  2.7     10-05  Mg     2.1     10-05    TPro  6.0  /  Alb  3.0<L>  /  TBili  1.8<H>  /  DBili  x   /  AST  43<H>  /  ALT  41  /  AlkPhos  94  10-06          MICROBIOLOGY:  Blood cultures:  10/4 X 2 - MSSA (4/4 bottles);  10/6 X 2 - NGTD    10/4 - Wound L spine:  numerous WBCs, mod GPCCL; mod MSSA  Wound other: numerous WBCs, few GPCCL; mod MSSA    OR cultures 10/5:  Surgical swabs X 3 - Staph aureus    RADIOLOGY & ADDITIONAL STUDIES:  CXR 10/6 (my review) - apical lordotic, elevated right hemidiaphragm

## 2018-10-06 NOTE — PROGRESS NOTE ADULT - ASSESSMENT
84 yo M with HTN, CAD s/p stents s/p lumbar laminectomy 9/21 with deep SSI with MSSA in blood, superficial wound cultures and OR cultures s/p I&D 10/5.  Would continue broad spectrum coverage until cultures are finalized.  Suggest:  - F/U blood cultures from 10/6  - Continue nafcillin 2 g IV q4h  - Continue cefepime 2 g IV q12h for now.  Recommendations discussed with primary team.  Will follow with you - ID service.

## 2018-10-06 NOTE — PROGRESS NOTE ADULT - SUBJECTIVE AND OBJECTIVE BOX
Interval Events: Reviewed  Patient seen and examined at bedside.    Patient is a 83y old  Male who presents with a chief complaint of Wound draiange (06 Oct 2018 01:05)      PAST MEDICAL & SURGICAL HISTORY:  Asthma  BPH (benign prostatic hyperplasia)  Chronic bilateral low back pain with bilateral sciatica  Claudication  HLD (hyperlipidemia)  CAD (coronary artery disease): coronory stents x 2  HTN (hypertension)  H/O laminectomy  Status post lumbar laminectomy  History of appendectomy  History of partial thyroidectomy  History of percutaneous transluminal coronary angioplasty      MEDICATIONS:  Pulmonary:    Antimicrobials:  cefepime   IVPB 2000 milliGRAM(s) IV Intermittent every 12 hours  cefepime   IVPB      nafcillin  IVPB      nafcillin  IVPB 2 Gram(s) IV Intermittent every 4 hours    Anticoagulants:    Cardiac:  losartan 25 milliGRAM(s) Oral daily      Allergies    No Known Allergies    Intolerances        Vital Signs Last 24 Hrs  T(C): 37.9 (06 Oct 2018 06:54), Max: 38.9 (06 Oct 2018 05:12)  T(F): 100.2 (06 Oct 2018 06:54), Max: 102 (06 Oct 2018 05:12)  HR: 90 (06 Oct 2018 12:30) (70 - 90)  BP: 139/70 (06 Oct 2018 12:30) (115/57 - 139/70)  BP(mean): --  RR: 18 (06 Oct 2018 05:12) (18 - 18)  SpO2: 92% (06 Oct 2018 12:30) (92% - 100%)    10-05 @ 07:01  -  10-06 @ 07:00  --------------------------------------------------------  IN: 2035 mL / OUT: 535 mL / NET: 1500 mL    10-06 @ 07:01  -  10-06 @ 17:24  --------------------------------------------------------  IN: 1075 mL / OUT: 15 mL / NET: 1060 mL          LABS:      CBC Full  -  ( 06 Oct 2018 08:36 )  WBC Count : 7.0 K/uL  Hemoglobin : 14.3 g/dL  Hematocrit : 42.2 %  Platelet Count - Automated : 212 K/uL  Mean Cell Volume : 92.1 fL  Mean Cell Hemoglobin : 31.2 pg  Mean Cell Hemoglobin Concentration : 33.9 g/dL  Auto Neutrophil # : x  Auto Lymphocyte # : x  Auto Monocyte # : x  Auto Eosinophil # : x  Auto Basophil # : x  Auto Neutrophil % : x  Auto Lymphocyte % : x  Auto Monocyte % : x  Auto Eosinophil % : x  Auto Basophil % : x    10-06    134<L>  |  96  |  28<H>  ----------------------------<  116<H>  4.2   |  23  |  1.44<H>    Ca    8.5      06 Oct 2018 08:36  Phos  2.7     10-05  Mg     2.1     10-05    TPro  6.0  /  Alb  3.0<L>  /  TBili  1.8<H>  /  DBili  x   /  AST  43<H>  /  ALT  41  /  AlkPhos  94  10-06        Culture - Blood (10.06.18 @ 00:15)    Specimen Source: .Blood Blood    Culture Results:   No growth at 12 hours    Culture - Surgical Swab (10.05.18 @ 14:53)    Gram Stain:   Rare Gram Positive Cocci in Clusters  Few White blood cells    Specimen Source: .Surgical Swab None    Culture Results:   Moderate Staphylococcus aureus  Susceptibility to follow.                Culture Results:   No growth at 12 hours (10-06 @ 00:15)  Culture Results:   No growth at 12 hours (10-06 @ 00:15)  Culture Results:   Moderate Staphylococcus aureus  See previous culture for sensitivity result (10-05 @ 14:53)  Culture Results:   Moderate Staphylococcus aureus  Susceptibility to follow. (10-05 @ 14:53)  Culture Results:   Moderate Staphylococcus aureus  Susceptibility to follow. (10-05 @ 14:53)  Culture Results:   Moderate Staphylococcus aureus (10-04 @ 21:54)      RADIOLOGY & ADDITIONAL STUDIES (The following images were personally reviewed):  Rousseau:                                     No  Urine output:                       adequate  DVT prophylaxis:                 Yes  Flattus:                                  Yes  Bowel movement:              No

## 2018-10-06 NOTE — PHYSICAL THERAPY INITIAL EVALUATION ADULT - GENERAL OBSERVATIONS, REHAB EVAL
Patient received semi-supine in no acute distress, c/o pain 4/10, +Telemetry, +Wound vac, +SCDs. Spouse present. Cleared by DEVONTE Darling.

## 2018-10-06 NOTE — PHYSICAL THERAPY INITIAL EVALUATION ADULT - PLANNED THERAPY INTERVENTIONS, PT EVAL
transfer training/strengthening/postural re-education/gait training/balance training/bed mobility training

## 2018-10-07 DIAGNOSIS — I25.10 ATHEROSCLEROTIC HEART DISEASE OF NATIVE CORONARY ARTERY WITHOUT ANGINA PECTORIS: ICD-10-CM

## 2018-10-07 DIAGNOSIS — I10 ESSENTIAL (PRIMARY) HYPERTENSION: ICD-10-CM

## 2018-10-07 DIAGNOSIS — K59.00 CONSTIPATION, UNSPECIFIED: ICD-10-CM

## 2018-10-07 DIAGNOSIS — R06.6 HICCOUGH: ICD-10-CM

## 2018-10-07 DIAGNOSIS — E87.1 HYPO-OSMOLALITY AND HYPONATREMIA: ICD-10-CM

## 2018-10-07 DIAGNOSIS — E78.5 HYPERLIPIDEMIA, UNSPECIFIED: ICD-10-CM

## 2018-10-07 LAB
-  CEFAZOLIN: SIGNIFICANT CHANGE UP
-  CLINDAMYCIN: SIGNIFICANT CHANGE UP
-  ERYTHROMYCIN: SIGNIFICANT CHANGE UP
-  LINEZOLID: SIGNIFICANT CHANGE UP
-  OXACILLIN: SIGNIFICANT CHANGE UP
-  PENICILLIN: SIGNIFICANT CHANGE UP
-  RIFAMPIN: SIGNIFICANT CHANGE UP
-  TRIMETHOPRIM/SULFAMETHOXAZOLE: SIGNIFICANT CHANGE UP
-  VANCOMYCIN: SIGNIFICANT CHANGE UP
ANION GAP SERPL CALC-SCNC: 16 MMOL/L — SIGNIFICANT CHANGE UP (ref 5–17)
APPEARANCE UR: CLEAR — SIGNIFICANT CHANGE UP
BILIRUB UR-MCNC: NEGATIVE — SIGNIFICANT CHANGE UP
BUN SERPL-MCNC: 35 MG/DL — HIGH (ref 7–23)
CALCIUM SERPL-MCNC: 8.6 MG/DL — SIGNIFICANT CHANGE UP (ref 8.4–10.5)
CHLORIDE SERPL-SCNC: 101 MMOL/L — SIGNIFICANT CHANGE UP (ref 96–108)
CO2 SERPL-SCNC: 21 MMOL/L — LOW (ref 22–31)
COLOR SPEC: YELLOW — SIGNIFICANT CHANGE UP
CREAT SERPL-MCNC: 2.14 MG/DL — HIGH (ref 0.5–1.3)
CULTURE RESULTS: SIGNIFICANT CHANGE UP
DIFF PNL FLD: ABNORMAL
GLUCOSE SERPL-MCNC: 129 MG/DL — HIGH (ref 70–99)
GLUCOSE UR QL: NEGATIVE — SIGNIFICANT CHANGE UP
GRAM STN FLD: SIGNIFICANT CHANGE UP
GRAM STN FLD: SIGNIFICANT CHANGE UP
HCT VFR BLD CALC: 44.1 % — SIGNIFICANT CHANGE UP (ref 39–50)
HGB BLD-MCNC: 15.1 G/DL — SIGNIFICANT CHANGE UP (ref 13–17)
KETONES UR-MCNC: NEGATIVE — SIGNIFICANT CHANGE UP
LEUKOCYTE ESTERASE UR-ACNC: NEGATIVE — SIGNIFICANT CHANGE UP
MAGNESIUM SERPL-MCNC: 2.4 MG/DL — SIGNIFICANT CHANGE UP (ref 1.6–2.6)
MCHC RBC-ENTMCNC: 32.3 PG — SIGNIFICANT CHANGE UP (ref 27–34)
MCHC RBC-ENTMCNC: 34.2 G/DL — SIGNIFICANT CHANGE UP (ref 32–36)
MCV RBC AUTO: 94.4 FL — SIGNIFICANT CHANGE UP (ref 80–100)
METHOD TYPE: SIGNIFICANT CHANGE UP
NITRITE UR-MCNC: NEGATIVE — SIGNIFICANT CHANGE UP
ORGANISM # SPEC MICROSCOPIC CNT: SIGNIFICANT CHANGE UP
PH UR: 6 — SIGNIFICANT CHANGE UP (ref 5–8)
PHOSPHATE SERPL-MCNC: 4 MG/DL — SIGNIFICANT CHANGE UP (ref 2.5–4.5)
PLATELET # BLD AUTO: 205 K/UL — SIGNIFICANT CHANGE UP (ref 150–400)
POTASSIUM SERPL-MCNC: 4.1 MMOL/L — SIGNIFICANT CHANGE UP (ref 3.5–5.3)
POTASSIUM SERPL-SCNC: 4.1 MMOL/L — SIGNIFICANT CHANGE UP (ref 3.5–5.3)
PROT UR-MCNC: ABNORMAL MG/DL
RBC # BLD: 4.67 M/UL — SIGNIFICANT CHANGE UP (ref 4.2–5.8)
RBC # FLD: 14.4 % — SIGNIFICANT CHANGE UP (ref 10.3–16.9)
SODIUM SERPL-SCNC: 138 MMOL/L — SIGNIFICANT CHANGE UP (ref 135–145)
SP GR SPEC: 1.01 — SIGNIFICANT CHANGE UP (ref 1–1.03)
SPECIMEN SOURCE: SIGNIFICANT CHANGE UP
UROBILINOGEN FLD QL: 0.2 E.U./DL — SIGNIFICANT CHANGE UP
WBC # BLD: 7.1 K/UL — SIGNIFICANT CHANGE UP (ref 3.8–10.5)
WBC # FLD AUTO: 7.1 K/UL — SIGNIFICANT CHANGE UP (ref 3.8–10.5)

## 2018-10-07 PROCEDURE — 99233 SBSQ HOSP IP/OBS HIGH 50: CPT

## 2018-10-07 PROCEDURE — 99223 1ST HOSP IP/OBS HIGH 75: CPT

## 2018-10-07 RX ORDER — LACTULOSE 10 G/15ML
10 SOLUTION ORAL ONCE
Qty: 0 | Refills: 0 | Status: COMPLETED | OUTPATIENT
Start: 2018-10-07 | End: 2018-10-07

## 2018-10-07 RX ORDER — CHLORPROMAZINE HCL 10 MG
25 TABLET ORAL ONCE
Qty: 0 | Refills: 0 | Status: COMPLETED | OUTPATIENT
Start: 2018-10-07 | End: 2018-10-07

## 2018-10-07 RX ADMIN — CEFEPIME 100 MILLIGRAM(S): 1 INJECTION, POWDER, FOR SOLUTION INTRAMUSCULAR; INTRAVENOUS at 05:03

## 2018-10-07 RX ADMIN — Medication 100 MILLIGRAM(S): at 13:11

## 2018-10-07 RX ADMIN — Medication 650 MILLIGRAM(S): at 09:17

## 2018-10-07 RX ADMIN — OXYCODONE AND ACETAMINOPHEN 1 TABLET(S): 5; 325 TABLET ORAL at 03:40

## 2018-10-07 RX ADMIN — NAFCILLIN 200 GRAM(S): 10 INJECTION, POWDER, FOR SOLUTION INTRAVENOUS at 06:09

## 2018-10-07 RX ADMIN — Medication 5 MILLIGRAM(S): at 06:08

## 2018-10-07 RX ADMIN — FAMOTIDINE 20 MILLIGRAM(S): 10 INJECTION INTRAVENOUS at 10:22

## 2018-10-07 RX ADMIN — ATORVASTATIN CALCIUM 10 MILLIGRAM(S): 80 TABLET, FILM COATED ORAL at 22:09

## 2018-10-07 RX ADMIN — Medication 650 MILLIGRAM(S): at 22:17

## 2018-10-07 RX ADMIN — Medication 650 MILLIGRAM(S): at 23:15

## 2018-10-07 RX ADMIN — NAFCILLIN 200 GRAM(S): 10 INJECTION, POWDER, FOR SOLUTION INTRAVENOUS at 09:19

## 2018-10-07 RX ADMIN — Medication 10 MILLIGRAM(S): at 10:22

## 2018-10-07 RX ADMIN — OXYCODONE AND ACETAMINOPHEN 1 TABLET(S): 5; 325 TABLET ORAL at 04:40

## 2018-10-07 RX ADMIN — SODIUM CHLORIDE 2 GRAM(S): 9 INJECTION INTRAMUSCULAR; INTRAVENOUS; SUBCUTANEOUS at 00:22

## 2018-10-07 RX ADMIN — ENOXAPARIN SODIUM 40 MILLIGRAM(S): 100 INJECTION SUBCUTANEOUS at 22:11

## 2018-10-07 RX ADMIN — SODIUM CHLORIDE 75 MILLILITER(S): 9 INJECTION INTRAMUSCULAR; INTRAVENOUS; SUBCUTANEOUS at 13:16

## 2018-10-07 RX ADMIN — Medication 100 MILLIGRAM(S): at 06:09

## 2018-10-07 RX ADMIN — NAFCILLIN 200 GRAM(S): 10 INJECTION, POWDER, FOR SOLUTION INTRAVENOUS at 22:10

## 2018-10-07 RX ADMIN — NAFCILLIN 200 GRAM(S): 10 INJECTION, POWDER, FOR SOLUTION INTRAVENOUS at 02:18

## 2018-10-07 RX ADMIN — LACTULOSE 10 GRAM(S): 10 SOLUTION ORAL at 13:11

## 2018-10-07 RX ADMIN — Medication 102 MILLIGRAM(S): at 09:18

## 2018-10-07 RX ADMIN — Medication 10 MILLIGRAM(S): at 05:03

## 2018-10-07 RX ADMIN — NAFCILLIN 200 GRAM(S): 10 INJECTION, POWDER, FOR SOLUTION INTRAVENOUS at 17:51

## 2018-10-07 RX ADMIN — NAFCILLIN 200 GRAM(S): 10 INJECTION, POWDER, FOR SOLUTION INTRAVENOUS at 13:11

## 2018-10-07 NOTE — PROGRESS NOTE ADULT - SUBJECTIVE AND OBJECTIVE BOX
INTERVAL HPI/OVERNIGHT EVENTS:    CONSTITUTIONAL:  No fever, chills, night sweats  EYES:  No photophobia or visual changes  CARDIOVASCULAR:  No chest pain  RESPIRATORY:  No cough, wheezing, or SOB   GASTROINTESTINAL:  No nausea, vomiting, diarrhea, constipation, or abdominal pain  GENITOURINARY:  No frequency, urgency, dysuria or hematuria  NEUROLOGIC:  No headache, lightheadedness      ANTIBIOTICS/RELEVANT:          Vital Signs Last 24 Hrs  T(C): 36.4 (07 Oct 2018 09:14), Max: 37.7 (06 Oct 2018 20:40)  T(F): 97.5 (07 Oct 2018 09:14), Max: 99.8 (06 Oct 2018 20:40)  HR: 92 (07 Oct 2018 09:14) (77 - 92)  BP: 119/64 (07 Oct 2018 09:14) (96/59 - 139/70)  BP(mean): --  RR: 18 (07 Oct 2018 09:14) (17 - 18)  SpO2: 96% (07 Oct 2018 09:14) (92% - 98%)    PHYSICAL EXAM:  Constitutional:  Well-developed, well nourished  Eyes:  Sclerae anicterica, conjunctivae clear, PERRL  Ear/Nose/Throat:  No nasal exudate or sinus tenderness;  No buccal mucosal lesions, no pharyngeal erythema or exudate	  Neck:  Supple, no adenopathy  Respiratory:  Clear bilaterally  Cardiovascular:  RRR, S1S2, no murmur appreciated  Gastrointestinal:  Symmetric, normoactive BS, soft, NT, no masses, guarding or rebound.  No HSM  Extremities:  No edema      LABS:                        15.1   7.1   )-----------( 205      ( 07 Oct 2018 07:54 )             44.1         10-07    138  |  101  |  35<H>  ----------------------------<  129<H>  4.1   |  21<L>  |  2.14<H>    Ca    8.6      07 Oct 2018 07:54  Phos  4.0     10-07  Mg     2.4     10-07    TPro  6.0  /  Alb  3.0<L>  /  TBili  1.8<H>  /  DBili  x   /  AST  43<H>  /  ALT  41  /  AlkPhos  94  10-06          MICROBIOLOGY:        RADIOLOGY & ADDITIONAL STUDIES: INTERVAL HPI/OVERNIGHT EVENTS:    CONSTITUTIONAL:  No fever, chills, night sweats  EYES:  No photophobia or visual changes  CARDIOVASCULAR:  No chest pain  RESPIRATORY:  No cough, wheezing, or SOB   GASTROINTESTINAL:  No nausea, vomiting, diarrhea, constipation, or abdominal pain  GENITOURINARY:  No frequency, urgency, dysuria or hematuria  NEUROLOGIC:  No headache, lightheadedness      ANTIBIOTICS/RELEVANT:    Nafcillin 2 g IV q4h (10/5-present)  Cefepime 2 g IV q12h (10/5-present)    Vancomycin 10/4-10/5  Pip-tazo 10/4-10/5          Vital Signs Last 24 Hrs  T(C): 36.4 (07 Oct 2018 09:14), Max: 37.7 (06 Oct 2018 20:40)  T(F): 97.5 (07 Oct 2018 09:14), Max: 99.8 (06 Oct 2018 20:40)  HR: 92 (07 Oct 2018 09:14) (77 - 92)  BP: 119/64 (07 Oct 2018 09:14) (96/59 - 139/70)  BP(mean): --  RR: 18 (07 Oct 2018 09:14) (17 - 18)  SpO2: 96% (07 Oct 2018 09:14) (92% - 98%)    PHYSICAL EXAM:  Constitutional:  Well-developed, well nourished  Eyes:  Sclerae anicterica, conjunctivae clear, PERRL  Ear/Nose/Throat:  No nasal exudate or sinus tenderness;  No buccal mucosal lesions, no pharyngeal erythema or exudate	  Neck:  Supple, no adenopathy  Respiratory:  Clear bilaterally  Cardiovascular:  RRR, S1S2, no murmur appreciated  Gastrointestinal:  Symmetric, normoactive BS, soft, NT, no masses, guarding or rebound.  No HSM  Extremities:  No edema      LABS:                        15.1   7.1   )-----------( 205      ( 07 Oct 2018 07:54 )             44.1         10-07    138  |  101  |  35<H>  ----------------------------<  129<H>  4.1   |  21<L>  |  2.14<H>    Ca    8.6      07 Oct 2018 07:54  Phos  4.0     10-07  Mg     2.4     10-07    TPro  6.0  /  Alb  3.0<L>  /  TBili  1.8<H>  /  DBili  x   /  AST  43<H>  /  ALT  41  /  AlkPhos  94  10-06          MICROBIOLOGY:    Blood cultures:  10/4 X 2 - MSSA (4/4 bottles);  10/6 X 2 - Gram positive    10/4 - Wound L spine:  numerous WBCs, mod GPCCL; mod MSSA  Wound other: numerous WBCs, few GPCCL; mod MSSA    OR cultures 10/5:  Surgical swabs X 3 - Staph aureus        RADIOLOGY & ADDITIONAL STUDIES: INTERVAL HPI/OVERNIGHT EVENTS:    Blood cultures from 10/6 are now growing GPC.    ROS:  Unobtainable - very lethergic    ANTIBIOTICS/RELEVANT:    Nafcillin 2 g IV q4h (10/5-present)  Cefepime 2 g IV q12h (10/5-present)    Vancomycin 10/4-10/5  Pip-tazo 10/4-10/5      Vital Signs Last 24 Hrs  T(C): 36.4 (07 Oct 2018 09:14), Max: 37.7 (06 Oct 2018 20:40)  T(F): 97.5 (07 Oct 2018 09:14), Max: 99.8 (06 Oct 2018 20:40)  HR: 92 (07 Oct 2018 09:14) (77 - 92)  BP: 119/64 (07 Oct 2018 09:14) (96/59 - 139/70)  BP(mean): --  RR: 18 (07 Oct 2018 09:14) (17 - 18)  SpO2: 96% (07 Oct 2018 09:14) (92% - 98%)    PHYSICAL EXAM:  Constitutional:  Very lethargic, unable to fully arouse  Eyes:  Sclerae anicteric, conjunctivae clear  Ear/Nose/Throat:  No nasal exudate or sinus tenderness;  No buccal mucosal lesions, no pharyngeal erythema or exudate	  Neck:  Supple, no adenopathy  Respiratory:  Clear bilaterally anteriorly  Cardiovascular:  RRR, distant S1S2, no murmur appreciated  Gastrointestinal:  Symmetric, normoactive BS, soft, NT, no masses, guarding or rebound.  No HSM  Back:  Unable to assess b/o mental status  Extremities:  No edema  Neuro:  Unable to assess b/o mental status      LABS:                        15.1   7.1   )-----------( 205      ( 07 Oct 2018 07:54 )             44.1         10-07    138  |  101  |  35<H>  ----------------------------<  129<H>  4.1   |  21<L>  |  2.14<H>    Ca    8.6      07 Oct 2018 07:54  Phos  4.0     10-07  Mg     2.4     10-07    TPro  6.0  /  Alb  3.0<L>  /  TBili  1.8<H>  /  DBili  x   /  AST  43<H>  /  ALT  41  /  AlkPhos  94  10-06          MICROBIOLOGY:    Blood cultures:  10/4 X 2 - MSSA (4/4 bottles);  10/6 X 2 - GPCCL (2/4 bottles, 2 sets)    10/4 - Wound L spine:  numerous WBCs, mod GPCCL; mod MSSA  Wound other: numerous WBCs, few GPCCL; mod MSSA    OR cultures 10/5:  Surgical swabs X 3 - Staph aureus        RADIOLOGY & ADDITIONAL STUDIES:  < from: Xray Chest 1 View- PORTABLE-Urgent (10.06.18 @ 07:24) >  FINDINGS: Heart size is stable. Right hemidiaphragm is again noted to be   elevated. The lungs are hypoinflated. There may be a small left pleural   effusion. There is no pneumothorax. There is no focal consolidation.    IMPRESSION: As above.    < end of copied text >

## 2018-10-07 NOTE — PROGRESS NOTE ADULT - PROBLEM SELECTOR PLAN 5
continue IVFs, encouraged PO intake  continue dulcolax, colace, and senna  maybe 2/2 pain meds, consider decreasing narcotics

## 2018-10-07 NOTE — PROGRESS NOTE ADULT - ASSESSMENT
Repeat blood cultures  Continue nafcillin  D/c cefepime. Persistent septicemia, ANJU    Repeat blood cultures  Continue nafcillin  D/c cefepime. 84 yo M with HTN, CAD s/p stents s/p lumbar laminectomy 9/21 with deep SSI with MSSA in blood, superficial wound cultures and OR cultures s/p I&D 10/5. Blood cultures from 10/6 are now positive indicating persistent septicemia - was bacteremic for at least 3 d. Temp curve is now trending down and leukocytosis has resolved.  Nothing else has grown from OR cultures.  Would narrow therapy.  Now also with ANJU - Cr 2.1 - was 1.1 on admission.  Mental status is worrisome - unclear if related to meds.  Suggest:  -Repeat blood cultures X 2 sets  -Continue nafcillin 2 g IV q4h  -D/c cefepime  -Would consider head CT if his mental status does not improve soon - unfortunately, he cannot receive IV contrast, which would be required to distinguish infectious focus.  -He will need JENIFER when more stable  Would consider observing on unit with higher accuity of care - discussed with Dr. Lucero and NS PA.

## 2018-10-07 NOTE — PROGRESS NOTE ADULT - PROBLEM SELECTOR PLAN 3
pt had reaction to salt tabs, repeat Na pt had reaction to salt tabs, repeat Na normal. may dc salt tabs

## 2018-10-07 NOTE — PROGRESS NOTE ADULT - ASSESSMENT
83y old Male with PMH CAD, HTN, HLD, who presented with a chief complaint of Wound drainage (07 Oct 2018 06:09) s/p lumbar laminectomy on 9/21/18, wound infection on naficillin and cefepime.

## 2018-10-07 NOTE — PROGRESS NOTE ADULT - SUBJECTIVE AND OBJECTIVE BOX
HPI:  82yo left handed male with PMH CAD HDL HTN s/p 9/21/2018 Lumbar lami and discharge with home care. Pt doing well until yesterday noted pain right flank and RLQ pain. Distal end of wound noted to be dehiscence and purulent drainage. Pt chills, afebrile. Denies urinary or bowel dysfunction. (04 Oct 2018 14:18)    OVERNIGHT EVENTS: Hiccups since OR, started on Reglan. Significant incisional pain, somewhat improved with PO pain meds. KEITH drain - 70cc overnight.    10/5: s/p lumbar wound washout  10/6: febrile to 102 overnight. Creatinine bump today 1.4 from 1.1, cont IVF @125, sputum cx sent, keep KEITH drain in place o/p 75cc/12 hours and 85cc/24 hours, started on salt tabs for Na 134, started on percs and dilaudid PCA d/c'd. OR and wound cx growing MSSA, cont naficillin and cefepime per ID  10/7: On Reglan for hiccups, cont pain meds. PT/OT evaluation. Post op Blood cultures positive for staph, pending final results.      Vital Signs Last 24 Hrs  T(C): 36.6 (07 Oct 2018 03:39), Max: 37.9 (06 Oct 2018 06:54)  T(F): 97.9 (07 Oct 2018 03:39), Max: 100.2 (06 Oct 2018 06:54)  HR: 87 (07 Oct 2018 03:39) (77 - 90)  BP: 130/76 (07 Oct 2018 03:39) (96/59 - 139/70)  BP(mean): --  RR: 17 (07 Oct 2018 03:39) (17 - 18)  SpO2: 95% (07 Oct 2018 03:39) (92% - 97%)    I&O's Summary    05 Oct 2018 07:01  -  06 Oct 2018 07:00  --------------------------------------------------------  IN: 2035 mL / OUT: 535 mL / NET: 1500 mL    06 Oct 2018 07:01  -  07 Oct 2018 06:10  --------------------------------------------------------  IN: 2450 mL / OUT: 785 mL / NET: 1665 mL        PHYSICAL EXAM: Gen: NAD, AAOx3  HEENT: PERRL. EOMI.  Neck FROM, nontender  Lungs clear b/l  Heart S1, S2. NSR.  Abd soft, obese, NT/ND. +BS  Exts Pulses 2+ throughout  Back: C/D/I w/ dressing, mild spotting.  Neuro: CNs intact. 5/5 str x4 extremities, with RLE somewhat effort limited. Sensation to LT intact. Following commands.      TUBES/LINES:  [] Rousseau  [] Lumbar Drain  [] Wound Drains  [x] Others      DIET:  [] NPO  [x] Mechanical  [] Tube feeds    LABS:                        14.3   7.0   )-----------( 212      ( 06 Oct 2018 08:36 )             42.2     10-06    134<L>  |  96  |  28<H>  ----------------------------<  116<H>  4.2   |  23  |  1.44<H>    Ca    8.5      06 Oct 2018 08:36  Phos  2.7     10-05  Mg     2.1     10-05    TPro  6.0  /  Alb  3.0<L>  /  TBili  1.8<H>  /  DBili  x   /  AST  43<H>  /  ALT  41  /  AlkPhos  94  10-06            CAPILLARY BLOOD GLUCOSE          Drug Levels: [] N/A    CSF Analysis: [] N/A      Allergies    No Known Allergies    Intolerances      MEDICATIONS:  Antibiotics:  cefepime   IVPB 2000 milliGRAM(s) IV Intermittent every 12 hours  cefepime   IVPB      nafcillin  IVPB      nafcillin  IVPB 2 Gram(s) IV Intermittent every 4 hours    Neuro:  acetaminophen   Tablet .. 650 milliGRAM(s) Oral every 6 hours PRN  cyclobenzaprine 10 milliGRAM(s) Oral three times a day PRN  diazepam    Tablet 5 milliGRAM(s) Oral every 6 hours  HYDROmorphone  Injectable 1 milliGRAM(s) IV Push every 4 hours PRN  metoclopramide Injectable 10 milliGRAM(s) IV Push every 6 hours PRN  ondansetron Injectable 4 milliGRAM(s) IV Push every 6 hours PRN  oxyCODONE    5 mG/acetaminophen 325 mG 2 Tablet(s) Oral every 4 hours PRN  oxyCODONE    5 mG/acetaminophen 325 mG 1 Tablet(s) Oral every 4 hours PRN    Anticoagulation:  enoxaparin Injectable 40 milliGRAM(s) SubCutaneous at bedtime    OTHER:  atorvastatin 10 milliGRAM(s) Oral at bedtime  bisacodyl 5 milliGRAM(s) Oral every 12 hours  bisacodyl Suppository 10 milliGRAM(s) Rectal daily  docusate sodium 100 milliGRAM(s) Oral three times a day  famotidine    Tablet 20 milliGRAM(s) Oral daily  losartan 25 milliGRAM(s) Oral daily  senna 2 Tablet(s) Oral at bedtime    IVF:  sodium chloride 2 Gram(s) Oral every 6 hours  sodium chloride 0.9%. 1000 milliLiter(s) IV Continuous <Continuous>    CULTURES:  Culture Results:   Culture in progress (10-06 @ 00:15)  Culture Results:   No growth at 1 day. (10-06 @ 00:15)    RADIOLOGY & ADDITIONAL TESTS:      ASSESSMENT: 83y Male with CAD, HDL, HTN, h/o lumbar laminectomy 9/21/18 now with suspected wound infection with wound culture showing MSSA, now s/p lumbar wound washout POD#2.    PLAN:  -Continue Antibiotics as per ID, pending final OR and blood cultures,  -Likely to need PICC line,  -PT/OT for activity and disposition,  -Pain meds PRN,  -Reglan for hiccups, consider Thorazine if persistent,  -Sputum culture sent,  -IVF for increased Crt, pending AM labs,  -Will monitor KEITH output  -SQL for dvt prophylaxis,  -D/w Dr. Rick

## 2018-10-07 NOTE — PROGRESS NOTE ADULT - SUBJECTIVE AND OBJECTIVE BOX
Interval Events: Reviewed  Patient seen and examined at bedside.    Patient is a 83y old Male with PMH CAD, HTN, HLD, who presented with a chief complaint of Wound drainage (07 Oct 2018 06:09) s/p lumbar laminectomy on 9/21/18. pt doing well aside from hiccups and constipation. states reglan helped relieve hiccups over night but hiccups returned this morning, would like to try thorazine if they do not resolve. pt also notes he did have some CP after taking salt tabs yesterday, does not want any more salt tabs. denies any pain at this time.       PAST MEDICAL & SURGICAL HISTORY:  Asthma  BPH (benign prostatic hyperplasia)  Chronic bilateral low back pain with bilateral sciatica  Claudication  HLD (hyperlipidemia)  CAD (coronary artery disease): coronory stents x 2  HTN (hypertension)  H/O laminectomy  Status post lumbar laminectomy  History of appendectomy  History of partial thyroidectomy  History of percutaneous transluminal coronary angioplasty      MEDICATIONS:  Pulmonary:    Antimicrobials:  cefepime   IVPB 2000 milliGRAM(s) IV Intermittent every 12 hours  cefepime   IVPB      nafcillin  IVPB      nafcillin  IVPB 2 Gram(s) IV Intermittent every 4 hours    Anticoagulants:  enoxaparin Injectable 40 milliGRAM(s) SubCutaneous at bedtime    Cardiac:  losartan 25 milliGRAM(s) Oral daily      Allergies    No Known Allergies    Intolerances        Vital Signs Last 24 Hrs  T(C): 36.4 (07 Oct 2018 09:14), Max: 37.7 (06 Oct 2018 20:40)  T(F): 97.5 (07 Oct 2018 09:14), Max: 99.8 (06 Oct 2018 20:40)  HR: 92 (07 Oct 2018 09:14) (77 - 92)  BP: 119/64 (07 Oct 2018 09:14) (96/59 - 139/70)  BP(mean): --  RR: 18 (07 Oct 2018 09:14) (17 - 18)  SpO2: 96% (07 Oct 2018 09:14) (92% - 98%)    10-06 @ 07:01  -  10-07 @ 07:00  --------------------------------------------------------  IN: 2450 mL / OUT: 785 mL / NET: 1665 mL          LABS:      CBC Full  -  ( 07 Oct 2018 07:54 )  WBC Count : 7.1 K/uL  Hemoglobin : 15.1 g/dL  Hematocrit : 44.1 %  Platelet Count - Automated : 205 K/uL  Mean Cell Volume : 94.4 fL  Mean Cell Hemoglobin : 32.3 pg  Mean Cell Hemoglobin Concentration : 34.2 g/dL  Auto Neutrophil # : x  Auto Lymphocyte # : x  Auto Monocyte # : x  Auto Eosinophil # : x  Auto Basophil # : x  Auto Neutrophil % : x  Auto Lymphocyte % : x  Auto Monocyte % : x  Auto Eosinophil % : x  Auto Basophil % : x    10-07    138  |  101  |  35<H>  ----------------------------<  129<H>  4.1   |  21<L>  |  2.14<H>    Ca    8.6      07 Oct 2018 07:54  Phos  4.0     10-07  Mg     2.4     10-07    TPro  6.0  /  Alb  3.0<L>  /  TBili  1.8<H>  /  DBili  x   /  AST  43<H>  /  ALT  41  /  AlkPhos  94  10-06                    Culture Results:   Growth in aerobic bottle: Staphylococcus aureus  Susceptibility to follow. (10-06 @ 00:15)  Culture Results:   No growth at 1 day. (10-06 @ 00:15)  Culture Results:   Moderate Staphylococcus aureus  See previous culture for sensitivity result (10-05 @ 14:53)  Culture Results:   Moderate Staphylococcus aureus  Susceptibility to follow. (10-05 @ 14:53)  Culture Results:   Moderate Staphylococcus aureus  Susceptibility to follow. (10-05 @ 14:53)      RADIOLOGY & ADDITIONAL STUDIES (The following images were personally reviewed):  Rousseau:                                     No  Urine output:                       adequate  DVT prophylaxis:                 Yes  Flattus:                                  Yes  Bowel movement:              No

## 2018-10-08 LAB
ANION GAP SERPL CALC-SCNC: 17 MMOL/L — SIGNIFICANT CHANGE UP (ref 5–17)
BUN SERPL-MCNC: 37 MG/DL — HIGH (ref 7–23)
CALCIUM SERPL-MCNC: 8.1 MG/DL — LOW (ref 8.4–10.5)
CHLORIDE SERPL-SCNC: 101 MMOL/L — SIGNIFICANT CHANGE UP (ref 96–108)
CO2 SERPL-SCNC: 18 MMOL/L — LOW (ref 22–31)
CREAT SERPL-MCNC: 2.69 MG/DL — HIGH (ref 0.5–1.3)
EOSINOPHIL NFR URNS MANUAL: SIGNIFICANT CHANGE UP
GLUCOSE SERPL-MCNC: 102 MG/DL — HIGH (ref 70–99)
HCT VFR BLD CALC: 41.8 % — SIGNIFICANT CHANGE UP (ref 39–50)
HGB BLD-MCNC: 14.4 G/DL — SIGNIFICANT CHANGE UP (ref 13–17)
LACTATE SERPL-SCNC: 2.7 MMOL/L — HIGH (ref 0.5–2)
MAGNESIUM SERPL-MCNC: 2.4 MG/DL — SIGNIFICANT CHANGE UP (ref 1.6–2.6)
MCHC RBC-ENTMCNC: 31.2 PG — SIGNIFICANT CHANGE UP (ref 27–34)
MCHC RBC-ENTMCNC: 34.4 G/DL — SIGNIFICANT CHANGE UP (ref 32–36)
MCV RBC AUTO: 90.5 FL — SIGNIFICANT CHANGE UP (ref 80–100)
PHOSPHATE SERPL-MCNC: 4.1 MG/DL — SIGNIFICANT CHANGE UP (ref 2.5–4.5)
PLATELET # BLD AUTO: 218 K/UL — SIGNIFICANT CHANGE UP (ref 150–400)
POTASSIUM SERPL-MCNC: 4.1 MMOL/L — SIGNIFICANT CHANGE UP (ref 3.5–5.3)
POTASSIUM SERPL-SCNC: 4.1 MMOL/L — SIGNIFICANT CHANGE UP (ref 3.5–5.3)
RBC # BLD: 4.62 M/UL — SIGNIFICANT CHANGE UP (ref 4.2–5.8)
RBC # FLD: 14.7 % — SIGNIFICANT CHANGE UP (ref 10.3–16.9)
SODIUM SERPL-SCNC: 136 MMOL/L — SIGNIFICANT CHANGE UP (ref 135–145)
WBC # BLD: 7.6 K/UL — SIGNIFICANT CHANGE UP (ref 3.8–10.5)
WBC # FLD AUTO: 7.6 K/UL — SIGNIFICANT CHANGE UP (ref 3.8–10.5)

## 2018-10-08 PROCEDURE — 74018 RADEX ABDOMEN 1 VIEW: CPT | Mod: 26

## 2018-10-08 PROCEDURE — 71045 X-RAY EXAM CHEST 1 VIEW: CPT | Mod: 26

## 2018-10-08 PROCEDURE — 99233 SBSQ HOSP IP/OBS HIGH 50: CPT | Mod: GC

## 2018-10-08 PROCEDURE — 99232 SBSQ HOSP IP/OBS MODERATE 35: CPT | Mod: GC

## 2018-10-08 RX ORDER — SODIUM CHLORIDE 9 MG/ML
1000 INJECTION INTRAMUSCULAR; INTRAVENOUS; SUBCUTANEOUS
Qty: 0 | Refills: 0 | Status: DISCONTINUED | OUTPATIENT
Start: 2018-10-08 | End: 2018-10-10

## 2018-10-08 RX ORDER — HEPARIN SODIUM 5000 [USP'U]/ML
5000 INJECTION INTRAVENOUS; SUBCUTANEOUS EVERY 8 HOURS
Qty: 0 | Refills: 0 | Status: DISCONTINUED | OUTPATIENT
Start: 2018-10-08 | End: 2018-10-12

## 2018-10-08 RX ORDER — IPRATROPIUM/ALBUTEROL SULFATE 18-103MCG
3 AEROSOL WITH ADAPTER (GRAM) INHALATION EVERY 6 HOURS
Qty: 0 | Refills: 0 | Status: DISCONTINUED | OUTPATIENT
Start: 2018-10-08 | End: 2018-10-12

## 2018-10-08 RX ORDER — ALBUTEROL 90 UG/1
1 AEROSOL, METERED ORAL EVERY 4 HOURS
Qty: 0 | Refills: 0 | Status: DISCONTINUED | OUTPATIENT
Start: 2018-10-08 | End: 2018-10-12

## 2018-10-08 RX ORDER — SODIUM CHLORIDE 9 MG/ML
1000 INJECTION INTRAMUSCULAR; INTRAVENOUS; SUBCUTANEOUS ONCE
Qty: 0 | Refills: 0 | Status: COMPLETED | OUTPATIENT
Start: 2018-10-08 | End: 2018-10-08

## 2018-10-08 RX ORDER — TIOTROPIUM BROMIDE 18 UG/1
1 CAPSULE ORAL; RESPIRATORY (INHALATION) DAILY
Qty: 0 | Refills: 0 | Status: DISCONTINUED | OUTPATIENT
Start: 2018-10-08 | End: 2018-10-12

## 2018-10-08 RX ADMIN — NAFCILLIN 200 GRAM(S): 10 INJECTION, POWDER, FOR SOLUTION INTRAVENOUS at 06:30

## 2018-10-08 RX ADMIN — NAFCILLIN 200 GRAM(S): 10 INJECTION, POWDER, FOR SOLUTION INTRAVENOUS at 02:31

## 2018-10-08 RX ADMIN — SENNA PLUS 2 TABLET(S): 8.6 TABLET ORAL at 21:55

## 2018-10-08 RX ADMIN — NAFCILLIN 200 GRAM(S): 10 INJECTION, POWDER, FOR SOLUTION INTRAVENOUS at 10:42

## 2018-10-08 RX ADMIN — Medication 100 MILLIGRAM(S): at 06:34

## 2018-10-08 RX ADMIN — FAMOTIDINE 20 MILLIGRAM(S): 10 INJECTION INTRAVENOUS at 14:03

## 2018-10-08 RX ADMIN — HEPARIN SODIUM 5000 UNIT(S): 5000 INJECTION INTRAVENOUS; SUBCUTANEOUS at 14:03

## 2018-10-08 RX ADMIN — ATORVASTATIN CALCIUM 10 MILLIGRAM(S): 80 TABLET, FILM COATED ORAL at 21:58

## 2018-10-08 RX ADMIN — SODIUM CHLORIDE 2 GRAM(S): 9 INJECTION INTRAMUSCULAR; INTRAVENOUS; SUBCUTANEOUS at 18:25

## 2018-10-08 RX ADMIN — HEPARIN SODIUM 5000 UNIT(S): 5000 INJECTION INTRAVENOUS; SUBCUTANEOUS at 21:56

## 2018-10-08 RX ADMIN — Medication 3 MILLILITER(S): at 23:49

## 2018-10-08 RX ADMIN — SODIUM CHLORIDE 75 MILLILITER(S): 9 INJECTION INTRAMUSCULAR; INTRAVENOUS; SUBCUTANEOUS at 03:46

## 2018-10-08 RX ADMIN — SODIUM CHLORIDE 2 GRAM(S): 9 INJECTION INTRAMUSCULAR; INTRAVENOUS; SUBCUTANEOUS at 23:49

## 2018-10-08 RX ADMIN — SODIUM CHLORIDE 75 MILLILITER(S): 9 INJECTION INTRAMUSCULAR; INTRAVENOUS; SUBCUTANEOUS at 23:49

## 2018-10-08 RX ADMIN — Medication 100 MILLIGRAM(S): at 21:56

## 2018-10-08 RX ADMIN — NAFCILLIN 200 GRAM(S): 10 INJECTION, POWDER, FOR SOLUTION INTRAVENOUS at 14:03

## 2018-10-08 RX ADMIN — Medication 3 MILLILITER(S): at 18:25

## 2018-10-08 RX ADMIN — Medication 3 MILLILITER(S): at 10:40

## 2018-10-08 RX ADMIN — NAFCILLIN 200 GRAM(S): 10 INJECTION, POWDER, FOR SOLUTION INTRAVENOUS at 21:56

## 2018-10-08 RX ADMIN — Medication 100 MILLIGRAM(S): at 14:03

## 2018-10-08 RX ADMIN — NAFCILLIN 200 GRAM(S): 10 INJECTION, POWDER, FOR SOLUTION INTRAVENOUS at 18:25

## 2018-10-08 RX ADMIN — SODIUM CHLORIDE 500 MILLILITER(S): 9 INJECTION INTRAMUSCULAR; INTRAVENOUS; SUBCUTANEOUS at 01:10

## 2018-10-08 NOTE — DIETITIAN INITIAL EVALUATION ADULT. - NS AS NUTRI INTERV ED CONTENT
Purpose of the nutrition education/Pt was educated on increased needs post-op. Discussed optimal nutrient dense foods high in protein. Pt was receptive and expressed understanding.

## 2018-10-08 NOTE — DIETITIAN INITIAL EVALUATION ADULT. - OTHER INFO
Patient is a 83y old Male who presents with a chief complaint of Wound drainage. Currently on regular diet and tolerating PO. Reports appetite was bad but returning to fair. Had ~100% intake for breakfast. NKFA. No apparant GI distress, N/V but c/o hiccups. Reports no wt changes. Discussed prioritizing protein needs and addressed questions related to nutrition. Skin: surgical incision; GI: WDL per flowsheet. Patient is a 83y old Male who presents with a chief complaint of Wound drainage. Currently on regular diet and tolerating PO. Reports appetite was bad but returning to fair. Had ~100% intake for breakfast. NKFA or dietary restrictions. No apparant GI distress, N/V but c/o hiccups. Reports no wt changes. Discussed prioritizing protein needs and addressed questions related to nutrition. Skin: surgical incision; GI: WDL per flowsheet. Patient is a 83y old Male who presents with a chief complaint of Wound drainage. Currently on regular diet and tolerating PO. Reports appetite was bad but returning to fair. Had ~100% intake for breakfast. NKFA or dietary restrictions. No apparant GI distress, N/V but c/o hiccups. Reports no wt changes. Discussed prioritizing protein needs and addressed questions related to nutrition. Pt receptive and expressed understanding. Skin: surgical incision; GI: WDL per flowsheet. Patient is a 83y old Male who presents with a chief complaint of Wound drainage. Currently on regular diet and tolerating PO. Reports appetite was bad but returning to fair. Had ~100% intake for breakfast. NKFA or dietary restrictions. Last BM was yesterday. No apparent GI distress, N/V but c/o hiccups. Reports no wt changes. Discussed prioritizing protein needs and addressed questions related to nutrition. Pt receptive and expressed understanding. Skin: surgical incision; GI: WDL per flowsheet. Patient is a 83y old Male who presents with a chief complaint of Wound drainage. Currently on regular diet and tolerating PO. Reports appetite was bad but returning to fair. Had ~100% intake for breakfast. NKFA or dietary restrictions. Last BM was yesterday. No apparent GI distress, N/V but c/o hiccups. Denies any wt changes. Discussed prioritizing protein needs and addressed questions related to nutrition. Pt receptive and expressed understanding. Skin: surgical incision; GI: WDL per flowsheet. Patient is a 83y old Male who presents with a chief complaint of Wound drainage, has h/o HTN, CAD, hypercholesterolemia, HLD. Currently on regular diet and tolerating PO. Reports appetite was bad but returning to fair. Had ~100% intake for breakfast. NKFA or dietary restrictions. Last BM was yesterday. No apparent GI distress, N/V but c/o hiccups. Denies any wt changes. Discussed prioritizing protein needs and addressed questions related to nutrition. Pt receptive and expressed understanding. Skin: surgical incision; GI: WDL per flowsheet. 82yo M w/ h/o HTN, CAD, hypercholesterolemia, HLD. Presents with a chief complaint of wound drainage. Currently on regular diet and tolerating PO. Reports having poor appetite post-op but has slowly been returning. Consumed ~100% for breakfast this am. No apparent GI distress, no N/V. Last BM was yesterday 10/7. C/o hiccups, team aware, ordered for thorazine. Denies any wt changes PTA. Discussed increased needs post-op and optimizing nutrient dense foods at meals. Pt receptive and expressed understanding. NKFA or dietary restrictions. Skin: surgical incision; GI: WDL per flowsheet.

## 2018-10-08 NOTE — OCCUPATIONAL THERAPY INITIAL EVALUATION ADULT - IMPAIRMENTS CONTRIBUTING IMPAIRED BED MOBILITY, REHAB EVAL
impaired balance/decreased strength/impaired motor control/pain/impaired coordination/decreased ROM/decreased endurance

## 2018-10-08 NOTE — PROGRESS NOTE ADULT - SUBJECTIVE AND OBJECTIVE BOX
ID FOLLOW UP NOTE    INTERVAL HPI: Patient awake and alert today, complaining of hiccups but otherwise can move LE without issue. No other complaints.     BACK PAIN; WOUND INFECTION    HLD (hyperlipidemia)  HTN (hypertension)  CAD (coronary artery disease)  Constipation  Hiccups  Hyponatremia  ANJU (acute kidney injury)  Preoperative clearance  Wound infection      ROS:  CONSTITUTIONAL:  Negative fever or chills, feels well, good appetite  EYES:  Negative  blurry vision or double vision  CARDIOVASCULAR:  Negative for chest pain or palpitations  RESPIRATORY:  Negative for cough, wheezing, or SOB   GASTROINTESTINAL:  Negative for nausea, vomiting, diarrhea, constipation, or abdominal pain  GENITOURINARY:  Negative frequency, urgency or dysuria  NEUROLOGIC:  No headache, confusion, dizziness, lightheadedness    Allergies    No Known Allergies    Intolerances        ANTIBIOTICS/RELEVANT:  antimicrobials  nafcillin  IVPB      nafcillin  IVPB 2 Gram(s) IV Intermittent every 4 hours    immunologic:    OTHER:  acetaminophen   Tablet .. 650 milliGRAM(s) Oral every 6 hours PRN  ALBUTerol    90 MICROgram(s) HFA Inhaler 1 Puff(s) Inhalation every 4 hours  ALBUTerol/ipratropium for Nebulization 3 milliLiter(s) Nebulizer every 6 hours  atorvastatin 10 milliGRAM(s) Oral at bedtime  bisacodyl 5 milliGRAM(s) Oral every 12 hours  bisacodyl Suppository 10 milliGRAM(s) Rectal daily  docusate sodium 100 milliGRAM(s) Oral three times a day  famotidine    Tablet 20 milliGRAM(s) Oral daily  heparin  Injectable 5000 Unit(s) SubCutaneous every 8 hours  metoclopramide Injectable 10 milliGRAM(s) IV Push every 6 hours PRN  ondansetron Injectable 4 milliGRAM(s) IV Push every 6 hours PRN  senna 2 Tablet(s) Oral at bedtime  sodium chloride 2 Gram(s) Oral every 6 hours  sodium chloride 0.9%. 1000 milliLiter(s) IV Continuous <Continuous>  tiotropium 18 MICROgram(s) Capsule 1 Capsule(s) Inhalation daily      Objective:  Vital Signs Last 24 Hrs  T(C): 36.1 (08 Oct 2018 18:15), Max: 36.7 (08 Oct 2018 03:06)  T(F): 97 (08 Oct 2018 18:15), Max: 98 (08 Oct 2018 03:06)  HR: 90 (08 Oct 2018 18:15) (74 - 775)  BP: 128/74 (08 Oct 2018 18:15) (70/50 - 131/72)  BP(mean): --  RR: 17 (08 Oct 2018 18:15) (16 - 18)  SpO2: 99% (08 Oct 2018 18:15) (94% - 99%)    PHYSICAL EXAM:  Constitutional: Well-developed, well nourished  Eyes: ALICIA, EOMI  Ear/Nose/Throat: no oral lesion, no sinus tenderness on percussion	  Neck: no JVD, no lymphadenopathy, supple  Respiratory: CTA gabby  Cardiovascular: S1S2 RRR, no murmurs  Gastrointestinal:soft, (+) BS, no HSM  Extremities: no e/e/c  Neuro: B/l LE strength 5/5, sensation intact.         LABS:                        14.4   7.6   )-----------( 218      ( 08 Oct 2018 12:57 )             41.8     10-08    136  |  101  |  37<H>  ----------------------------<  102<H>  4.1   |  18<L>  |  2.69<H>    Ca    8.1<L>      08 Oct 2018 07:54  Phos  4.1     10-08  Mg     2.4     10-08        Urinalysis Basic - ( 07 Oct 2018 21:55 )    Color: Yellow / Appearance: Clear / S.010 / pH: x  Gluc: x / Ketone: NEGATIVE  / Bili: Negative / Urobili: 0.2 E.U./dL   Blood: x / Protein: Trace mg/dL / Nitrite: NEGATIVE   Leuk Esterase: NEGATIVE / RBC: < 5 /HPF / WBC < 5 /HPF   Sq Epi: x / Non Sq Epi: 0-5 /HPF / Bacteria: Present /HPF          MICROBIOLOGY:      Culture - Blood (collected 07 Oct 2018 14:59)  Source: .Blood Blood  Preliminary Report (08 Oct 2018 15:01):    No growth at 1 day.    Culture - Blood (collected 07 Oct 2018 14:59)  Source: .Blood Blood  Preliminary Report (08 Oct 2018 15:01):    No growth at 1 day.    Culture - Sputum (collected 06 Oct 2018 22:06)  Source: .Sputum Sputum  Gram Stain (07 Oct 2018 11:48):    Sputum specimen rejected.  Microscopic examination indicates    oropharyngeal contamination.  Please repeat.    Numerous epithelial cells    Numerous White blood cells    Few Gram positive cocci in pairs    Rare Gram Negative Rods    Rare Gram Negative Diplococci  Final Report (07 Oct 2018 11:52):    Sputum specimen rejected.  Microscopic examination indicates    oropharyngeal contamination.  Please repeat.    Culture - Blood (collected 06 Oct 2018 00:15)  Source: .Blood Blood  Gram Stain (07 Oct 2018 11:57):    Aerobic Bottle: Gram Positive Cocci in Clusters    Result called to and read back by_ AMAN Downs RN 10/07/2018 11:57:40  Preliminary Report (08 Oct 2018 12:11):    Growth in aerobic bottle: Staphylococcus aureus Susceptibility to follow.    Culture in progress    Culture - Blood (collected 06 Oct 2018 00:15)  Source: .Blood Blood  Gram Stain (06 Oct 2018 22:03):    Aerobic Bottle: Gram Positive Cocci in Clusters    Result called to and read back by_ RYAN WHITNEY  10/06/2018 22:03:11  Preliminary Report (07 Oct 2018 08:18):    Growth in aerobic bottle: Staphylococcus aureus    Susceptibility to follow.    Culture - Fungal, Other (collected 05 Oct 2018 21:41)  Source: .Other None  Preliminary Report (08 Oct 2018 12:14):    Testing in progress    Culture - Fungal, Other (collected 05 Oct 2018 21:41)  Source: .Other None  Preliminary Report (08 Oct 2018 12:14):    Testing in progress    Culture - Fungal, Other (collected 05 Oct 2018 21:40)  Source: .Other None  Preliminary Report (08 Oct 2018 12:14):    Testing in progress            RADIOLOGY & ADDITIONAL STUDIES: No new pertinent imaging.

## 2018-10-08 NOTE — PROGRESS NOTE ADULT - PROBLEM SELECTOR PLAN 8
Cr increased on IV fluids and most likely related to infection  Renal consult as the renal function is worsening Cr increased on IV fluids and most likely related to infection  Renal consult as the renal function is worsening  ordered renal US

## 2018-10-08 NOTE — PROGRESS NOTE ADULT - ASSESSMENT
82 yo M with HTN, CAD s/p stents s/p lumbar laminectomy 9/21 now with skin and soft tissue infection s/p I&D on 10/5 with wound culture growing MSSA, also with MSSA bacteremia X3 days now with cultures from 10/7 cleared of bacteremia.     Recommendations are as follows:    1. Will continue to follow repeat BC  2. C/w nafcillin 2 g IV q4h  3. JENIFER to evaluate for endocarditis    ID will continue to follow with you. 82 yo M with HTN, CAD s/p stents s/p lumbar laminectomy 9/21 now with skin and soft tissue infection s/p I&D on 10/5 with wound culture growing MSSA, also with MSSA bacteremia X3 days now with cultures from 10/7 showing NGTD     Recommendations are as follows:    1. Will continue to follow repeat BC  2. C/w nafcillin 2 g IV q4h  3. JENIFER to evaluate for endocarditis    ID will continue to follow with you.

## 2018-10-08 NOTE — OCCUPATIONAL THERAPY INITIAL EVALUATION ADULT - LEVEL OF INDEPENDENCE: TOILET, REHAB EVAL
unable to perform/patient verbally reports he is going to the bathroom without issues Patient declined 2/2 fatigue, although verbally reports he is going to the bathroom without issues

## 2018-10-08 NOTE — OCCUPATIONAL THERAPY INITIAL EVALUATION ADULT - IMPAIRED TRANSFERS: SIT/STAND, REHAB EVAL
impaired coordination/impaired motor control/decreased endurance/pain/decreased strength/impaired balance/impaired postural control

## 2018-10-08 NOTE — PROGRESS NOTE ADULT - SUBJECTIVE AND OBJECTIVE BOX
HPI:  82yo left handed male with PMH CAD HDL HTN s/p 2018 Lumbar lami and discharge with home care. Pt doing well until yesterday noted pain right flank and RLQ pain. Distal end of wound noted to be dehiscence and purulent drainage. Pt chills, afebrile. Denies urinary or bowel dysfunction. (04 Oct 2018 14:18)    OVERNIGHT EVENTS:  Vital Signs Last 24 Hrs  T(C): 36.7 (08 Oct 2018 03:06), Max: 37 (07 Oct 2018 17:38)  T(F): 98 (08 Oct 2018 03:06), Max: 98.6 (07 Oct 2018 17:38)  HR: 75 (08 Oct 2018 04:40) (74 - 775)  BP: 114/54 (08 Oct 2018 04:40) (70/50 - 119/64)  BP(mean): --  RR: 16 (08 Oct 2018 04:40) (16 - 20)  SpO2: 98% (08 Oct 2018 04:40) (94% - 98%)    I&O's Summary    07 Oct 2018 07:01  -  08 Oct 2018 07:00  --------------------------------------------------------  IN: 2225 mL / OUT: 1420 mL / NET: 805 mL        PHYSICAL EXAM:  Neurological:  A&OX3 Cranial nerves intact  RODRIGUEZ  Lami incisio CDI  KEITH small amounts serous drainage   not holding suction    Cardiovascular: RRR  Respiratory: Lungs CTAB  Gastrointestinal: +BS  Genitourinary: Voiding  Extremities: warm and dry      DIET: Regular  LABS:                        15.1   7.1   )-----------( 205      ( 07 Oct 2018 07:54 )             44.1     10-07    138  |  101  |  35<H>  ----------------------------<  129<H>  4.1   |  21<L>  |  2.14<H>    Ca    8.6      07 Oct 2018 07:54  Phos  4.0     10-07  Mg     2.4     10-07    TPro  6.0  /  Alb  3.0<L>  /  TBili  1.8<H>  /  DBili  x   /  AST  43<H>  /  ALT  41  /  AlkPhos  94  10-06      Urinalysis Basic - ( 07 Oct 2018 21:55 )    Color: Yellow / Appearance: Clear / S.010 / pH: x  Gluc: x / Ketone: NEGATIVE  / Bili: Negative / Urobili: 0.2 E.U./dL   Blood: x / Protein: Trace mg/dL / Nitrite: NEGATIVE   Leuk Esterase: NEGATIVE / RBC: < 5 /HPF / WBC < 5 /HPF   Sq Epi: x / Non Sq Epi: 0-5 /HPF / Bacteria: Present /HPF          CAPILLARY BLOOD GLUCOSE    Drug Levels: [] N/A    CSF Analysis: [] N/A      Allergies    No Known Allergies    Intolerances      MEDICATIONS:  Antibiotics:  nafcillin  IVPB      nafcillin  IVPB 2 Gram(s) IV Intermittent every 4 hours    Neuro:  acetaminophen   Tablet .. 650 milliGRAM(s) Oral every 6 hours PRN  metoclopramide Injectable 10 milliGRAM(s) IV Push every 6 hours PRN  ondansetron Injectable 4 milliGRAM(s) IV Push every 6 hours PRN    Anticoagulation:  heparin  Injectable 5000 Unit(s) SubCutaneous every 8 hours    OTHER:  atorvastatin 10 milliGRAM(s) Oral at bedtime  bisacodyl 5 milliGRAM(s) Oral every 12 hours  bisacodyl Suppository 10 milliGRAM(s) Rectal daily  docusate sodium 100 milliGRAM(s) Oral three times a day  famotidine    Tablet 20 milliGRAM(s) Oral daily  senna 2 Tablet(s) Oral at bedtime    IVF:  sodium chloride 2 Gram(s) Oral every 6 hours  sodium chloride 0.9%. 1000 milliLiter(s) IV Continuous <Continuous>    CULTURES:  Culture Results:   No growth at 12 hours (10-07 @ 14:59)  Culture Results:   No growth at 12 hours (10-07 @ 14:59)      ASSESSMENT:  83y Male s/p   dehiscence to posterior pole of incision. superficial and subfascial layers with very minimal clear yellow drainage. No overt fluid collection or abscess identified.	      PLAN:  NEURO:    Monitor neuro status  OT/PT.  Pain Management  Bowel regime    CARDIOVASCULAR:    Monitor BP  Continue tele  Continue antihypertensives    PULMONARY:  CXR  Incentive Spirometry  Monitor O2 sats  Chest PT    RENAL:    Monitor Creat  Continue IVF  F/U Creat  Consult Medicine     GI:  Bowel regime        ID:    IV antibx as per ID  Continue IV antibx  F/U OR Cx and fever workup    ENDO:  monitor Na level

## 2018-10-08 NOTE — OCCUPATIONAL THERAPY INITIAL EVALUATION ADULT - STANDING BALANCE: DYNAMIC, REHAB EVAL
patient unable to ambulate due to exhaustion from previous PT session and x-rays. Requested to sit down from stand./fair minus fair minus/patient verbally declined ambulation 2/2 fatigue from previous PT session and x-rays. Requested to sit down from stand.

## 2018-10-08 NOTE — PROGRESS NOTE ADULT - SUBJECTIVE AND OBJECTIVE BOX
Interval Events: Reviewed  Patient seen and examined at bedside.    Patient is a 83y old  Male who presents with a chief complaint of Wound drainage (08 Oct 2018 07:32)    he is having hiccughs and get sob when he has it  PAST MEDICAL & SURGICAL HISTORY:  Asthma  BPH (benign prostatic hyperplasia)  Chronic bilateral low back pain with bilateral sciatica  Claudication  HLD (hyperlipidemia)  CAD (coronary artery disease): coronory stents x 2  HTN (hypertension)  H/O laminectomy  Status post lumbar laminectomy  History of appendectomy  History of partial thyroidectomy  History of percutaneous transluminal coronary angioplasty      MEDICATIONS:  Pulmonary:  ALBUTerol    90 MICROgram(s) HFA Inhaler 1 Puff(s) Inhalation every 4 hours  ALBUTerol/ipratropium for Nebulization 3 milliLiter(s) Nebulizer every 6 hours  tiotropium 18 MICROgram(s) Capsule 1 Capsule(s) Inhalation daily    Antimicrobials:  nafcillin  IVPB      nafcillin  IVPB 2 Gram(s) IV Intermittent every 4 hours    Anticoagulants:  heparin  Injectable 5000 Unit(s) SubCutaneous every 8 hours    Cardiac:      Allergies    No Known Allergies    Intolerances        Vital Signs Last 24 Hrs  T(C): 36.4 (08 Oct 2018 09:52), Max: 37 (07 Oct 2018 17:38)  T(F): 97.6 (08 Oct 2018 09:52), Max: 98.6 (07 Oct 2018 17:38)  HR: 74 (08 Oct 2018 09:52) (74 - 775)  BP: 123/76 (08 Oct 2018 09:52) (70/50 - 123/76)  BP(mean): --  RR: 18 (08 Oct 2018 09:52) (16 - 20)  SpO2: 98% (08 Oct 2018 09:52) (94% - 98%)    10-07 @ 07:01  -  10-08 @ 07:00  --------------------------------------------------------  IN: 2225 mL / OUT: 1420 mL / NET: 805 mL          LABS:      CBC Full  -  ( 07 Oct 2018 07:54 )  WBC Count : 7.1 K/uL  Hemoglobin : 15.1 g/dL  Hematocrit : 44.1 %  Platelet Count - Automated : 205 K/uL  Mean Cell Volume : 94.4 fL  Mean Cell Hemoglobin : 32.3 pg  Mean Cell Hemoglobin Concentration : 34.2 g/dL  Auto Neutrophil # : x  Auto Lymphocyte # : x  Auto Monocyte # : x  Auto Eosinophil # : x  Auto Basophil # : x  Auto Neutrophil % : x  Auto Lymphocyte % : x  Auto Monocyte % : x  Auto Eosinophil % : x  Auto Basophil % : x    10-08    136  |  101  |  37<H>  ----------------------------<  102<H>  4.1   |  18<L>  |  2.69<H>    Ca    8.1<L>      08 Oct 2018 07:54  Phos  4.1     10-08  Mg     2.4     10-08            Urinalysis Basic - ( 07 Oct 2018 21:55 )    Color: Yellow / Appearance: Clear / S.010 / pH: x  Gluc: x / Ketone: NEGATIVE  / Bili: Negative / Urobili: 0.2 E.U./dL   Blood: x / Protein: Trace mg/dL / Nitrite: NEGATIVE   Leuk Esterase: NEGATIVE / RBC: < 5 /HPF / WBC < 5 /HPF   Sq Epi: x / Non Sq Epi: 0-5 /HPF / Bacteria: Present /HPF              Culture Results:   No growth at 12 hours (10-07 @ 14:59)  Culture Results:   No growth at 12 hours (10-07 @ 14:59)  Culture Results:   Sputum specimen rejected.  Microscopic examination indicates  oropharyngeal contamination.  Please repeat. (10-06 @ 22:06)      RADIOLOGY & ADDITIONAL STUDIES (The following images were personally reviewed):  Rousseau:                                     No  Urine output:                       adequate  DVT prophylaxis:                 Yes  Flattus:                                  Yes  Bowel movement:              No

## 2018-10-08 NOTE — OCCUPATIONAL THERAPY INITIAL EVALUATION ADULT - PLANNED THERAPY INTERVENTIONS, OT EVAL
motor coordination training/IADL retraining/ADL retraining/balance training/bed mobility training/fine motor coordination training/transfer training/ROM/strengthening

## 2018-10-08 NOTE — OCCUPATIONAL THERAPY INITIAL EVALUATION ADULT - SITTING BALANCE: STATIC
script called into the pharmacy voicemail.   at baseline patient unable to sustain an unsupported seated position/poor balance

## 2018-10-08 NOTE — DIETITIAN INITIAL EVALUATION ADULT. - NS AS NUTRI INTERV MEALS SNACK
General/healthful diet continue on regular diet as tolerated. Encourage protein options at meals./General/healthful diet

## 2018-10-08 NOTE — OCCUPATIONAL THERAPY INITIAL EVALUATION ADULT - GENERAL OBSERVATIONS, REHAB EVAL
Left hand dominant. Patient cleared for Occupational Therapy by RN . Patient received in NAD, semi-gandhi, + family present, +tele, +IV heplock, + b sequential compression devices, wound dressing with fresh blood-DEVONTE Mo aware. Patient denies pain. Left hand dominant. Patient cleared for Occupational Therapy by DEVONTE Mo. Patient received in NAD, semi-gandhi, + family present, +tele, +IV heplock, + b sequential compression devices, wound dressing with fresh blood-DEVONTE Mo aware. Patient denies pain.

## 2018-10-08 NOTE — DIETITIAN INITIAL EVALUATION ADULT. - ENERGY NEEDS
height = 5'6" wt= 192 lbs IBW 142lbs +/- 10%, %%, BMI = 31  IBW used for calculations as pt >120% of IBW.   Nutrient needs based on [facility] standards of care for maintenance in older adults. height = 5'6" wt= 192 lbs IBW 142lbs +/- 10%, %%, BMI = 31  IBW used for calculations as pt >120% of IBW.   Nutrient needs based on [facility] standards of care for maintenance in older adults. Needs adjusted for post-op. height: 5'6" wt: 192 lbs IBW 142lbs +/- 10%, %%, BMI: 31  IBW used for calculations as pt >120% of IBW.   Nutrient needs based on [facility] standards of care for maintenance in older adults.   Needs adjusted for post-op healing and age.

## 2018-10-08 NOTE — OCCUPATIONAL THERAPY INITIAL EVALUATION ADULT - PERTINENT HX OF CURRENT PROBLEM, REHAB EVAL
82yo left handed male with PMH CAD HDL HTN s/p 9/21/2018 Lumbar lami and discharge with home care. Pt doing well until yesterday noted pain right flank and RLQ pain. Distal end of wound noted to be dehiscence and purulent drainage.

## 2018-10-09 LAB
-  CEFAZOLIN: SIGNIFICANT CHANGE UP
-  CEFAZOLIN: SIGNIFICANT CHANGE UP
-  CLINDAMYCIN: SIGNIFICANT CHANGE UP
-  CLINDAMYCIN: SIGNIFICANT CHANGE UP
-  ERYTHROMYCIN: SIGNIFICANT CHANGE UP
-  ERYTHROMYCIN: SIGNIFICANT CHANGE UP
-  LINEZOLID: SIGNIFICANT CHANGE UP
-  LINEZOLID: SIGNIFICANT CHANGE UP
-  OXACILLIN: SIGNIFICANT CHANGE UP
-  OXACILLIN: SIGNIFICANT CHANGE UP
-  PENICILLIN: SIGNIFICANT CHANGE UP
-  PENICILLIN: SIGNIFICANT CHANGE UP
-  RIFAMPIN: SIGNIFICANT CHANGE UP
-  RIFAMPIN: SIGNIFICANT CHANGE UP
-  TRIMETHOPRIM/SULFAMETHOXAZOLE: SIGNIFICANT CHANGE UP
-  TRIMETHOPRIM/SULFAMETHOXAZOLE: SIGNIFICANT CHANGE UP
-  VANCOMYCIN: SIGNIFICANT CHANGE UP
ANION GAP SERPL CALC-SCNC: 15 MMOL/L — SIGNIFICANT CHANGE UP (ref 5–17)
BUN SERPL-MCNC: 34 MG/DL — HIGH (ref 7–23)
CALCIUM SERPL-MCNC: 8 MG/DL — LOW (ref 8.4–10.5)
CHLORIDE SERPL-SCNC: 99 MMOL/L — SIGNIFICANT CHANGE UP (ref 96–108)
CO2 SERPL-SCNC: 21 MMOL/L — LOW (ref 22–31)
CREAT SERPL-MCNC: 2.7 MG/DL — HIGH (ref 0.5–1.3)
CRP SERPL-MCNC: 8.49 MG/DL — HIGH (ref 0–0.4)
GLUCOSE SERPL-MCNC: 115 MG/DL — HIGH (ref 70–99)
HCT VFR BLD CALC: 38.3 % — LOW (ref 39–50)
HGB BLD-MCNC: 13.5 G/DL — SIGNIFICANT CHANGE UP (ref 13–17)
LACTATE SERPL-SCNC: 1.4 MMOL/L — SIGNIFICANT CHANGE UP (ref 0.5–2)
MAGNESIUM SERPL-MCNC: 2.5 MG/DL — SIGNIFICANT CHANGE UP (ref 1.6–2.6)
MCHC RBC-ENTMCNC: 31.1 PG — SIGNIFICANT CHANGE UP (ref 27–34)
MCHC RBC-ENTMCNC: 35.2 G/DL — SIGNIFICANT CHANGE UP (ref 32–36)
MCV RBC AUTO: 88.2 FL — SIGNIFICANT CHANGE UP (ref 80–100)
METHOD TYPE: SIGNIFICANT CHANGE UP
PHOSPHATE SERPL-MCNC: 4 MG/DL — SIGNIFICANT CHANGE UP (ref 2.5–4.5)
PLATELET # BLD AUTO: 240 K/UL — SIGNIFICANT CHANGE UP (ref 150–400)
POTASSIUM SERPL-MCNC: 4 MMOL/L — SIGNIFICANT CHANGE UP (ref 3.5–5.3)
POTASSIUM SERPL-SCNC: 4 MMOL/L — SIGNIFICANT CHANGE UP (ref 3.5–5.3)
RBC # BLD: 4.34 M/UL — SIGNIFICANT CHANGE UP (ref 4.2–5.8)
RBC # FLD: 14.5 % — SIGNIFICANT CHANGE UP (ref 10.3–16.9)
SODIUM SERPL-SCNC: 135 MMOL/L — SIGNIFICANT CHANGE UP (ref 135–145)
WBC # BLD: 7.6 K/UL — SIGNIFICANT CHANGE UP (ref 3.8–10.5)
WBC # FLD AUTO: 7.6 K/UL — SIGNIFICANT CHANGE UP (ref 3.8–10.5)

## 2018-10-09 PROCEDURE — 99233 SBSQ HOSP IP/OBS HIGH 50: CPT | Mod: GC

## 2018-10-09 PROCEDURE — 93312 ECHO TRANSESOPHAGEAL: CPT | Mod: 26

## 2018-10-09 PROCEDURE — 93320 DOPPLER ECHO COMPLETE: CPT | Mod: 26

## 2018-10-09 PROCEDURE — 93325 DOPPLER ECHO COLOR FLOW MAPG: CPT | Mod: 26

## 2018-10-09 RX ORDER — CEPHALEXIN 500 MG
500 CAPSULE ORAL
Qty: 0 | Refills: 0 | Status: DISCONTINUED | OUTPATIENT
Start: 2018-10-09 | End: 2018-10-10

## 2018-10-09 RX ADMIN — SODIUM CHLORIDE 2 GRAM(S): 9 INJECTION INTRAMUSCULAR; INTRAVENOUS; SUBCUTANEOUS at 23:12

## 2018-10-09 RX ADMIN — SODIUM CHLORIDE 2 GRAM(S): 9 INJECTION INTRAMUSCULAR; INTRAVENOUS; SUBCUTANEOUS at 05:57

## 2018-10-09 RX ADMIN — HEPARIN SODIUM 5000 UNIT(S): 5000 INJECTION INTRAVENOUS; SUBCUTANEOUS at 05:58

## 2018-10-09 RX ADMIN — NAFCILLIN 200 GRAM(S): 10 INJECTION, POWDER, FOR SOLUTION INTRAVENOUS at 05:59

## 2018-10-09 RX ADMIN — NAFCILLIN 200 GRAM(S): 10 INJECTION, POWDER, FOR SOLUTION INTRAVENOUS at 02:41

## 2018-10-09 RX ADMIN — Medication 500 MILLIGRAM(S): at 17:37

## 2018-10-09 RX ADMIN — HEPARIN SODIUM 5000 UNIT(S): 5000 INJECTION INTRAVENOUS; SUBCUTANEOUS at 23:14

## 2018-10-09 RX ADMIN — NAFCILLIN 200 GRAM(S): 10 INJECTION, POWDER, FOR SOLUTION INTRAVENOUS at 16:28

## 2018-10-09 RX ADMIN — NAFCILLIN 200 GRAM(S): 10 INJECTION, POWDER, FOR SOLUTION INTRAVENOUS at 12:45

## 2018-10-09 RX ADMIN — NAFCILLIN 200 GRAM(S): 10 INJECTION, POWDER, FOR SOLUTION INTRAVENOUS at 20:30

## 2018-10-09 RX ADMIN — ATORVASTATIN CALCIUM 10 MILLIGRAM(S): 80 TABLET, FILM COATED ORAL at 23:14

## 2018-10-09 RX ADMIN — SODIUM CHLORIDE 2 GRAM(S): 9 INJECTION INTRAMUSCULAR; INTRAVENOUS; SUBCUTANEOUS at 17:37

## 2018-10-09 NOTE — CONSULT NOTE ADULT - ATTENDING COMMENTS
I have reviewed the medical record, including laboratory and radiographic studies, interviewed and examined the patient and discussed the plan with Dr. Menchaca, the ID Resident, and primary team.  Agree with above. He has marked leukocytosis and copious drainage from wound with CT scan c/c infection near laminectomy site.  To my exam, his LE weakness is asymmetric R>L (patient reports as new today), would obtain MRI to evaluate for epidural abscess.  In the meantime, would give broad spectrum antibacterial therapy with vancomycin and pip-tazo.  Will follow with you – ID service.
ANJU likely 2/2 contrast.  Patient developed increased creatinine to 2.7 mg/dl that has improved somewhat over the past 24h.  The patient is underoing a nuclear scan to rule out infection.  JENIFER to rule ouf endocarditis.  On Nafcillin.
Patient seen and examined with house-staff during bedside rounds.  Resident note read, including vitals, physical findings, laboratory data, and radiological reports.   Revisions included below.  Direct personal management at bed side and extensive interpretation of the data.  Plan was outlined and discussed in details with the housestaff.  Decision making of high complexity  Action taken for acute disease activity to reflect the level of care provided:  - medication reconciliation  - review laboratory data

## 2018-10-09 NOTE — PROGRESS NOTE ADULT - SUBJECTIVE AND OBJECTIVE BOX
Interval Events: Reviewed  Patient seen and examined at bedside.    Patient is a 83y old  Male who presents with a chief complaint of Wound draiange (09 Oct 2018 18:55)    The pain in the back is stable. He lost his IV and the cannot get an IV.  PAST MEDICAL & SURGICAL HISTORY:  Asthma  BPH (benign prostatic hyperplasia)  Chronic bilateral low back pain with bilateral sciatica  Claudication  HLD (hyperlipidemia)  CAD (coronary artery disease): coronory stents x 2  HTN (hypertension)  H/O laminectomy  Status post lumbar laminectomy  History of appendectomy  History of partial thyroidectomy  History of percutaneous transluminal coronary angioplasty      MEDICATIONS:  Pulmonary:  ALBUTerol    90 MICROgram(s) HFA Inhaler 1 Puff(s) Inhalation every 4 hours  ALBUTerol/ipratropium for Nebulization 3 milliLiter(s) Nebulizer every 6 hours  tiotropium 18 MICROgram(s) Capsule 1 Capsule(s) Inhalation daily    Antimicrobials:  cephalexin 500 milliGRAM(s) Oral two times a day  nafcillin  IVPB      nafcillin  IVPB 2 Gram(s) IV Intermittent every 4 hours    Anticoagulants:  heparin  Injectable 5000 Unit(s) SubCutaneous every 8 hours    Cardiac:      Allergies    No Known Allergies    Intolerances        Vital Signs Last 24 Hrs  T(C): 36.8 (09 Oct 2018 20:29), Max: 36.9 (09 Oct 2018 09:25)  T(F): 98.3 (09 Oct 2018 20:29), Max: 98.5 (09 Oct 2018 09:25)  HR: 85 (09 Oct 2018 20:29) (81 - 92)  BP: 127/75 (09 Oct 2018 20:29) (104/55 - 127/75)  BP(mean): --  RR: 16 (09 Oct 2018 20:29) (15 - 18)  SpO2: 97% (09 Oct 2018 20:29) (95% - 100%)    10-08 @ 07:  -  10-09 @ 07:00  --------------------------------------------------------  IN: 1125 mL / OUT: 1150 mL / NET: -25 mL    10-09 @ 07:  -  10-09 @ 21:20  --------------------------------------------------------  IN: 100 mL / OUT: 200 mL / NET: -100 mL          LABS:      CBC Full  -  ( 09 Oct 2018 06:18 )  WBC Count : 7.6 K/uL  Hemoglobin : 13.5 g/dL  Hematocrit : 38.3 %  Platelet Count - Automated : 240 K/uL  Mean Cell Volume : 88.2 fL  Mean Cell Hemoglobin : 31.1 pg  Mean Cell Hemoglobin Concentration : 35.2 g/dL  Auto Neutrophil # : x  Auto Lymphocyte # : x  Auto Monocyte # : x  Auto Eosinophil # : x  Auto Basophil # : x  Auto Neutrophil % : x  Auto Lymphocyte % : x  Auto Monocyte % : x  Auto Eosinophil % : x  Auto Basophil % : x    10-09    135  |  99  |  34<H>  ----------------------------<  115<H>  4.0   |  21<L>  |  2.70<H>    Ca    8.0<L>      09 Oct 2018 06:18  Phos  4.0     10-09  Mg     2.5     10-09            Urinalysis Basic - ( 07 Oct 2018 21:55 )    Color: Yellow / Appearance: Clear / S.010 / pH: x  Gluc: x / Ketone: NEGATIVE  / Bili: Negative / Urobili: 0.2 E.U./dL   Blood: x / Protein: Trace mg/dL / Nitrite: NEGATIVE   Leuk Esterase: NEGATIVE / RBC: < 5 /HPF / WBC < 5 /HPF   Sq Epi: x / Non Sq Epi: 0-5 /HPF / Bacteria: Present /HPF              < from: JENIFER w/Doppler (10.09.18 @ 15:45) >  EXAM:  ESOPHAGEAL ECHO (CARDIOL)                          PROCEDURE DATE:  10/09/2018          INTERPRETATION:  Patient Height: 167.6 cm  Patient Weight: 87.1 kg  BSA: 2.0 m^2  Interpretation Summary  No evidence for interatrial shunt by color Doppler assessment.There is   mild   aortic valve thickening. The aortic valve is trileaflet. There is mild   aortic   regurgitation.  There is mild mitral valve thickening. There is mild   mitral   regurgitation.Structurally normal tricuspid valve. Thereis mild   tricuspid   regurgitation.Structurally normal pulmonic valve. No pulmonic   regurgitation   noted.The right ventricular systolic function is normal.The left   ventricular   ejection fraction is estimated to be 55-60%Mild atherosclerotic   plaque(s) in   the aortic arch.Mild atherosclerotic plaque(s) in the descending   aorta.There   is no pericardial effusion.No vegetations seen on any of the cardiac   valves.Aortic root appears thickened. No flow appreciated on color   doppler   interrogation. Cannot rule out aortic root abscess. Clinical correlation   suggested.    < end of copied text >      RADIOLOGY & ADDITIONAL STUDIES (The following images were personally reviewed):  Rousseau:                                     No  Urine output:                       adequate  DVT prophylaxis:                 Yes  Flattus:                                  Yes  Bowel movement:              No

## 2018-10-09 NOTE — CONSULT NOTE ADULT - PROBLEM SELECTOR RECOMMENDATION 9
- possibly due to AIN  vs ATN  - was on Nafcillin since 10/6/18  - baseline Cr 1.1 in Sept 2018 > with acute rise in Cr 2.1 on Oct 7   - Cr worsened to 2.7 with hydration overnight  - please send for Renal Sono  - strict I/O charting  - please send urine microscopy and eosinophil staining, urine WBC  - please send Ulytes: urine urea, urine cr, ur na  - send serum C3/C4/CH50  - Consider revising antibiotics : such as switching to Cefazolin  - avoid Nephrotoxic agents  - gentle hydration
Blood pressure is controlled. He is to continue on antihypertensive medication

## 2018-10-09 NOTE — PROGRESS NOTE ADULT - PROBLEM SELECTOR PLAN 7
cultures grew MSSA, and blood culture is negative continue nafcillin a    Repeat blood culture is negative. Patient is to get a PICC line for six weeks of IV antibiotic

## 2018-10-09 NOTE — PROGRESS NOTE ADULT - SUBJECTIVE AND OBJECTIVE BOX
ID FOLLOW UP NOTE    INTERVAL HPI: Patient without IV access this AM, later able to obtain with US. Upset that he was NPO for JENIFER. Otherwise back pain and weakness have improved.     BACK PAIN; WOUND INFECTION    HLD (hyperlipidemia)  HTN (hypertension)  CAD (coronary artery disease)  Constipation  Hiccups  Hyponatremia  ANJU (acute kidney injury)  Preoperative clearance  Wound infection      ROS:  CONSTITUTIONAL:  Negative fever or chills, feels well, good appetite  EYES:  Negative  blurry vision or double vision  CARDIOVASCULAR:  Negative for chest pain or palpitations  RESPIRATORY:  Negative for cough, wheezing, or SOB   GASTROINTESTINAL:  Negative for nausea, vomiting, diarrhea, constipation, or abdominal pain  GENITOURINARY:  Negative frequency, urgency or dysuria  NEUROLOGIC:  No headache, confusion, dizziness, lightheadedness    Allergies    No Known Allergies    Intolerances        ANTIBIOTICS/RELEVANT:  antimicrobials  cephalexin 500 milliGRAM(s) Oral two times a day  nafcillin  IVPB      nafcillin  IVPB 2 Gram(s) IV Intermittent every 4 hours    immunologic:    OTHER:  acetaminophen   Tablet .. 650 milliGRAM(s) Oral every 6 hours PRN  ALBUTerol    90 MICROgram(s) HFA Inhaler 1 Puff(s) Inhalation every 4 hours  ALBUTerol/ipratropium for Nebulization 3 milliLiter(s) Nebulizer every 6 hours  atorvastatin 10 milliGRAM(s) Oral at bedtime  bisacodyl 5 milliGRAM(s) Oral every 12 hours  bisacodyl Suppository 10 milliGRAM(s) Rectal daily  docusate sodium 100 milliGRAM(s) Oral three times a day  famotidine    Tablet 20 milliGRAM(s) Oral daily  heparin  Injectable 5000 Unit(s) SubCutaneous every 8 hours  metoclopramide Injectable 10 milliGRAM(s) IV Push every 6 hours PRN  ondansetron Injectable 4 milliGRAM(s) IV Push every 6 hours PRN  senna 2 Tablet(s) Oral at bedtime  sodium chloride 2 Gram(s) Oral every 6 hours  sodium chloride 0.9%. 1000 milliLiter(s) IV Continuous <Continuous>  tiotropium 18 MICROgram(s) Capsule 1 Capsule(s) Inhalation daily      Objective:  Vital Signs Last 24 Hrs  T(C): 36.6 (09 Oct 2018 17:27), Max: 36.9 (09 Oct 2018 09:25)  T(F): 97.9 (09 Oct 2018 17:27), Max: 98.5 (09 Oct 2018 09:25)  HR: 92 (09 Oct 2018 17:27) (81 - 92)  BP: 121/75 (09 Oct 2018 09:25) (104/55 - 124/66)  BP(mean): --  RR: 17 (09 Oct 2018 17:27) (15 - 18)  SpO2: 96% (09 Oct 2018 17:27) (95% - 100%)    PHYSICAL EXAM:  Constitutional: Well-developed, well nourished  Eyes:ALICIA, EOMI  Ear/Nose/Throat: no oral lesion, no sinus tenderness on percussion	  Neck:no JVD, no lymphadenopathy, supple  Respiratory: CTA gabby  Cardiovascular: S1S2 RRR, no murmurs  Gastrointestinal: soft, (+) BS, no HSM  Extremities: no e/e/c  Strength: 5/5 in b/l LE        LABS:                        13.5   7.6   )-----------( 240      ( 09 Oct 2018 06:18 )             38.3     10-    135  |  99  |  34<H>  ----------------------------<  115<H>  4.0   |  21<L>  |  2.70<H>    Ca    8.0<L>      09 Oct 2018 06:18  Phos  4.0     10  Mg     2.5     10-09        Urinalysis Basic - ( 07 Oct 2018 21:55 )    Color: Yellow / Appearance: Clear / S.010 / pH: x  Gluc: x / Ketone: NEGATIVE  / Bili: Negative / Urobili: 0.2 E.U./dL   Blood: x / Protein: Trace mg/dL / Nitrite: NEGATIVE   Leuk Esterase: NEGATIVE / RBC: < 5 /HPF / WBC < 5 /HPF   Sq Epi: x / Non Sq Epi: 0-5 /HPF / Bacteria: Present /HPF          MICROBIOLOGY:      Culture - Blood (collected 07 Oct 2018 14:59)  Source: .Blood Blood  Preliminary Report (09 Oct 2018 15:01):    No growth at 2 days.    Culture - Blood (collected 07 Oct 2018 14:59)  Source: .Blood Blood  Preliminary Report (09 Oct 2018 15:01):    No growth at 2 days.    Culture - Sputum (collected 06 Oct 2018 22:06)  Source: .Sputum Sputum  Gram Stain (07 Oct 2018 11:48):    Sputum specimen rejected.  Microscopic examination indicates    oropharyngeal contamination.  Please repeat.    Numerous epithelial cells    Numerous White blood cells    Few Gram positive cocci in pairs    Rare Gram Negative Rods    Rare Gram Negative Diplococci  Final Report (07 Oct 2018 11:52):    Sputum specimen rejected.  Microscopic examination indicates    oropharyngeal contamination.  Please repeat.            RADIOLOGY & ADDITIONAL STUDIES:    JENIFER:      INTERPRETATION:  Patient Height: 167.6 cm  Patient Weight: 87.1 kg  BSA: 2.0 m^2  Interpretation Summary  No evidence for interatrial shunt by color Doppler assessment.There is   mild   aortic valve thickening. The aortic valve is trileaflet. There is mild   aortic   regurgitation.  There is mild mitral valve thickening. There is mild   mitral   regurgitation.Structurally normal tricuspid valve. There is mild   tricuspid   regurgitation.Structurally normal pulmonic valve. No pulmonic   regurgitation   noted.The right ventricular systolic function is normal.The left   ventricular   ejection fraction is estimated to be 55-60%Mild atherosclerotic   plaque(s) in   the aortic arch.Mild atherosclerotic plaque(s) in the descending   aorta.There   is no pericardial effusion.No vegetations seen on any of the cardiac   valves.Aortic root appears thickened. No flow appreciated on color   doppler   interrogation. Cannot rule out aortic root abscess. Clinical correlation   suggested.

## 2018-10-09 NOTE — PROGRESS NOTE ADULT - PROBLEM SELECTOR PLAN 8
Cr increased on IV fluids and most likely related to infection  Renal consult as the renal function is worsening  ordered renal USWhich is pending. The creatinine rise decreased in most likely this is related to his sepsis and hopefully was start to plateau and come down patient has adequate

## 2018-10-09 NOTE — CONSULT NOTE ADULT - SUBJECTIVE AND OBJECTIVE BOX
83y old  Male who was admitted for wound infection.    Renal Consult was called for ANJU. Cr 2.7 from baseline 1.1 in 2018.  Patient was on Nafcillin since Oct 6.  Patient has been nonoliguric  	      HPI:  82yo left handed male with PMH CAD HDL HTN s/p 2018 Lumbar lami and discharge with home care. Pt doing well until yesterday noted pain right flank and RLQ pain. Distal end of wound noted to be dehiscence and purulent drainage. Pt chills, afebrile. Denies urinary or bowel dysfunction. (04 Oct 2018 14:18)      PAST MEDICAL & SURGICAL HISTORY:  Asthma  BPH (benign prostatic hyperplasia)  Chronic bilateral low back pain with bilateral sciatica  Claudication  HLD (hyperlipidemia)  CAD (coronary artery disease): coronory stents x 2  HTN (hypertension)  H/O laminectomy  Status post lumbar laminectomy  History of appendectomy  History of partial thyroidectomy  History of percutaneous transluminal coronary angioplasty      Allergies    No Known Allergies    Intolerances        FAMILY HISTORY:      SOCIAL HISTORY:    MEDICATIONS  (STANDING):  ALBUTerol    90 MICROgram(s) HFA Inhaler 1 Puff(s) Inhalation every 4 hours  ALBUTerol/ipratropium for Nebulization 3 milliLiter(s) Nebulizer every 6 hours  atorvastatin 10 milliGRAM(s) Oral at bedtime  bisacodyl 5 milliGRAM(s) Oral every 12 hours  bisacodyl Suppository 10 milliGRAM(s) Rectal daily  cephalexin 500 milliGRAM(s) Oral two times a day  docusate sodium 100 milliGRAM(s) Oral three times a day  famotidine    Tablet 20 milliGRAM(s) Oral daily  heparin  Injectable 5000 Unit(s) SubCutaneous every 8 hours  nafcillin  IVPB      nafcillin  IVPB 2 Gram(s) IV Intermittent every 4 hours  senna 2 Tablet(s) Oral at bedtime  sodium chloride 2 Gram(s) Oral every 6 hours  sodium chloride 0.9%. 1000 milliLiter(s) (75 mL/Hr) IV Continuous <Continuous>  tiotropium 18 MICROgram(s) Capsule 1 Capsule(s) Inhalation daily    MEDICATIONS  (PRN):  acetaminophen   Tablet .. 650 milliGRAM(s) Oral every 6 hours PRN Temp greater or equal to 38C (100.4F), Mild Pain (1 - 3)  metoclopramide Injectable 10 milliGRAM(s) IV Push every 6 hours PRN Hiccups  ondansetron Injectable 4 milliGRAM(s) IV Push every 6 hours PRN Nausea and/or Vomiting      Vital Signs Last 24 Hrs  T(C): 36.9 (09 Oct 2018 09:25), Max: 36.9 (09 Oct 2018 09:25)  T(F): 98.5 (09 Oct 2018 09:25), Max: 98.5 (09 Oct 2018 09:25)  HR: 91 (09 Oct 2018 09:25) (81 - 91)  BP: 121/75 (09 Oct 2018 09:25) (104/55 - 128/74)  BP(mean): --  RR: 18 (09 Oct 2018 09:25) (15 - 18)  SpO2: 99% (09 Oct 2018 09:25) (95% - 100%)    REVIEW OF SYSTEMS:    NAD        PHYSICAL EXAM:  NAD, alert, awake, no acute distress at present   NECK: Neck supple, No JVD  CHEST/LUNG: Clear to auscultation bilaterally; No wheeze, no rales, no crepitations  HEART: Regular rate and rhythm. BETH II/VI at LPSB, No gallop, no rub   ABDOMEN: distended, Soft, Nontender, BS+nt, No flank tenderness.   EXTREMITIES: No clubbing, cyanosis, or edema, no calf tenderness  Neurology: AAOx3, no focal neurological deficit                I&O's Summary    08 Oct 2018 07:01  -  09 Oct 2018 07:00  --------------------------------------------------------  IN: 1125 mL / OUT: 1150 mL / NET: -25 mL    09 Oct 2018 07:01  -  09 Oct 2018 17:07  --------------------------------------------------------  IN: 100 mL / OUT: 200 mL / NET: -100 mL          LABS:                                                   10-09-18 @ 06:18    135  |  99  |  34<H>  ----------------------------<  115<H>  4.0   |  21<L>  |  2.70<H>                                                 10-08-18 @ 07:54    136  |  101  |  37<H>  ----------------------------<  102<H>  4.1   |  18<L>  |  2.69<H>                                                 10-07-18 @ 07:54    138  |  101  |  35<H>  ----------------------------<  129<H>  4.1   |  21<L>  |  2.14<H>                                                 10-06-18 @ 08:36    134<L>  |  96  |  28<H>  ----------------------------<  116<H>  4.2   |  23  |  1.44<H>    Ca    8.0<L>      09 Oct 2018 06:18  Ca    8.1<L>      08 Oct 2018 07:54  Ca    8.6      07 Oct 2018 07:54  Ca    8.5      06 Oct 2018 08:36  Phos  4.0     10-09  Phos  4.1     10-08  Phos  4.0     10-07  Mg     2.5     10-09  Mg     2.4     10-08  Mg     2.4     10-07    TPro  6.0  /  Alb  3.0<L>  /  TBili  1.8<H>  /  DBili  x   /  AST  43<H>  /  ALT  41  /  AlkPhos  94  10-06                          13.5   7.6   )-----------( 240      ( 09 Oct 2018 06:18 )             38.3     CBC Full  -  ( 09 Oct 2018 06:18 )  WBC Count : 7.6 K/uL  Hemoglobin : 13.5 g/dL  Hematocrit : 38.3 %  Platelet Count - Automated : 240 K/uL  Mean Cell Volume : 88.2 fL      CBC Full  -  ( 08 Oct 2018 12:57 )  WBC Count : 7.6 K/uL  Hemoglobin : 14.4 g/dL  Hematocrit : 41.8 %  Platelet Count - Automated : 218 K/uL  Mean Cell Volume : 90.5 fL      CBC Full  -  ( 07 Oct 2018 07:54 )  WBC Count : 7.1 K/uL  Hemoglobin : 15.1 g/dL  Hematocrit : 44.1 %  Platelet Count - Automated : 205 K/uL  Mean Cell Volume : 94.4 fL      CBC Full  -  ( 06 Oct 2018 08:36 )  WBC Count : 7.0 K/uL  Hemoglobin : 14.3 g/dL  Hematocrit : 42.2 %  Platelet Count - Automated : 212 K/uL  Mean Cell Volume : 92.1 fL              Urinalysis Basic - ( 07 Oct 2018 21:55 )    Color: Yellow / Appearance: Clear / S.010 / pH: x  Gluc: x / Ketone: NEGATIVE  / Bili: Negative / Urobili: 0.2 E.U./dL   Blood: x / Protein: Trace mg/dL / Nitrite: NEGATIVE   Leuk Esterase: NEGATIVE / RBC: < 5 /HPF / WBC < 5 /HPF   Sq Epi: x / Non Sq Epi: 0-5 /HPF / Bacteria: Present /HPF        RADIOLOGY & ADDITIONAL TESTS:

## 2018-10-09 NOTE — CONSULT NOTE ADULT - ASSESSMENT
82yo left handed male with PMH CAD HDL HTN s/p 9/21/2018 Lumbar lami and discharge with home care. Patient was readmitted for wound infection, was on Nafcillin.  Renal Consult was called for ANJU of Cr 2.7 from baseline 1.1.

## 2018-10-09 NOTE — PROGRESS NOTE ADULT - ASSESSMENT
84 yo M with HTN, CAD s/p stents s/p lumbar laminectomy 9/21 now with skin and soft tissue infection s/p I&D on 10/5 with wound culture growing MSSA, also with MSSA bacteremia X3 days now with cultures from 10/7 showing NGTD. JENIFER done today, though no valvular disease noted, aortic root thickening noted which was read as "cannot rule out aortic root abscess" with clinical correlation advised.     Recommendations are as follows:    1. JENIFER noted- read as "Cannot rule out aortic root abscess. Clinical correlation suggested." Will need to clarify this read further in AM.   2. C/w nafcillin 2 g IV q4h   3. Renal input noted- in the setting of no urine eosinophils, alternative likely cause of ANJU (ATN due to hypotension), will defer changing antibiotic to cefazolin since there is high probability of failure in a high inoculum infection such as this. Especially in the setting of ?aortic root abscess.     ID will continue to follow with you. 82 yo M with HTN, CAD s/p stents s/p lumbar laminectomy 9/21 now with skin and soft tissue infection s/p I&D on 10/5 with wound culture growing MSSA, also with MSSA bacteremia X3 days now with cultures from 10/7 showing NGTD. JENIFER done today, though no valvular disease noted, aortic root thickening noted which was read as "cannot rule out aortic root abscess" with clinical correlation advised.     Recommendations are as follows:    1. JENIFER noted- read as "Cannot rule out aortic root abscess. Clinical correlation suggested." Will need to clarify this read further in AM.   2.  Please obtain manual diff with CBC to evaluate for eosinophilia  3. C/w nafcillin 2 g IV q4h   4. Renal input noted- in the setting of no urine eosinophils, alternative likely cause of ANJU (ATN due to hypotension), will defer changing antibiotic to cefazolin since there is higher probability of failure in a high inoculum infection such as this. Especially in the setting of ?aortic root abscess.     ID will continue to follow with you.

## 2018-10-10 ENCOUNTER — TRANSCRIPTION ENCOUNTER (OUTPATIENT)
Age: 83
End: 2018-10-10

## 2018-10-10 DIAGNOSIS — R78.81 BACTEREMIA: ICD-10-CM

## 2018-10-10 DIAGNOSIS — A41.9 SEPSIS, UNSPECIFIED ORGANISM: ICD-10-CM

## 2018-10-10 LAB
ANION GAP SERPL CALC-SCNC: 14 MMOL/L — SIGNIFICANT CHANGE UP (ref 5–17)
ANISOCYTOSIS BLD QL: SLIGHT — SIGNIFICANT CHANGE UP
BUN SERPL-MCNC: 31 MG/DL — HIGH (ref 7–23)
CALCIUM SERPL-MCNC: 7.7 MG/DL — LOW (ref 8.4–10.5)
CHLORIDE SERPL-SCNC: 105 MMOL/L — SIGNIFICANT CHANGE UP (ref 96–108)
CO2 SERPL-SCNC: 20 MMOL/L — LOW (ref 22–31)
CREAT SERPL-MCNC: 2.13 MG/DL — HIGH (ref 0.5–1.3)
EOSINOPHIL NFR BLD AUTO: 2 % — SIGNIFICANT CHANGE UP (ref 0–6)
GIANT PLATELETS BLD QL SMEAR: PRESENT — SIGNIFICANT CHANGE UP
GLUCOSE SERPL-MCNC: 98 MG/DL — SIGNIFICANT CHANGE UP (ref 70–99)
HCT VFR BLD CALC: 36.5 % — LOW (ref 39–50)
HGB BLD-MCNC: 12.8 G/DL — LOW (ref 13–17)
LG PLATELETS BLD QL AUTO: PRESENT — SIGNIFICANT CHANGE UP
LYMPHOCYTES # BLD AUTO: 21 % — SIGNIFICANT CHANGE UP (ref 13–44)
MANUAL SMEAR VERIFICATION: SIGNIFICANT CHANGE UP
MCHC RBC-ENTMCNC: 31.1 PG — SIGNIFICANT CHANGE UP (ref 27–34)
MCHC RBC-ENTMCNC: 35.1 G/DL — SIGNIFICANT CHANGE UP (ref 32–36)
MCV RBC AUTO: 88.6 FL — SIGNIFICANT CHANGE UP (ref 80–100)
METAMYELOCYTES # FLD: 1 % — HIGH
MONOCYTES NFR BLD AUTO: 8 % — SIGNIFICANT CHANGE UP (ref 2–14)
NEUTROPHILS NFR BLD AUTO: 65 % — SIGNIFICANT CHANGE UP (ref 43–77)
NEUTS BAND # BLD: 3 % — SIGNIFICANT CHANGE UP
PLAT MORPH BLD: ABNORMAL
PLATELET # BLD AUTO: 260 K/UL — SIGNIFICANT CHANGE UP (ref 150–400)
PLATELET CLUMP BLD QL SMEAR: PRESENT
POTASSIUM SERPL-MCNC: SIGNIFICANT CHANGE UP MMOL/L (ref 3.5–5.3)
POTASSIUM SERPL-SCNC: SIGNIFICANT CHANGE UP MMOL/L (ref 3.5–5.3)
RBC # BLD: 4.12 M/UL — LOW (ref 4.2–5.8)
RBC # FLD: 14.9 % — SIGNIFICANT CHANGE UP (ref 10.3–16.9)
RBC BLD AUTO: ABNORMAL
SODIUM SERPL-SCNC: 139 MMOL/L — SIGNIFICANT CHANGE UP (ref 135–145)
WBC # BLD: 7.6 K/UL — SIGNIFICANT CHANGE UP (ref 3.8–10.5)
WBC # FLD AUTO: 7.6 K/UL — SIGNIFICANT CHANGE UP (ref 3.8–10.5)

## 2018-10-10 PROCEDURE — 76775 US EXAM ABDO BACK WALL LIM: CPT | Mod: 26

## 2018-10-10 PROCEDURE — 99233 SBSQ HOSP IP/OBS HIGH 50: CPT | Mod: GC

## 2018-10-10 RX ORDER — NAFCILLIN 10 G/100ML
2 INJECTION, POWDER, FOR SOLUTION INTRAVENOUS
Qty: 1 | Refills: 0 | OUTPATIENT
Start: 2018-10-10 | End: 2018-12-08

## 2018-10-10 RX ADMIN — NAFCILLIN 200 GRAM(S): 10 INJECTION, POWDER, FOR SOLUTION INTRAVENOUS at 22:47

## 2018-10-10 RX ADMIN — SODIUM CHLORIDE 75 MILLILITER(S): 9 INJECTION INTRAMUSCULAR; INTRAVENOUS; SUBCUTANEOUS at 00:13

## 2018-10-10 RX ADMIN — SODIUM CHLORIDE 2 GRAM(S): 9 INJECTION INTRAMUSCULAR; INTRAVENOUS; SUBCUTANEOUS at 06:33

## 2018-10-10 RX ADMIN — NAFCILLIN 200 GRAM(S): 10 INJECTION, POWDER, FOR SOLUTION INTRAVENOUS at 04:02

## 2018-10-10 RX ADMIN — SODIUM CHLORIDE 2 GRAM(S): 9 INJECTION INTRAMUSCULAR; INTRAVENOUS; SUBCUTANEOUS at 13:23

## 2018-10-10 RX ADMIN — ATORVASTATIN CALCIUM 10 MILLIGRAM(S): 80 TABLET, FILM COATED ORAL at 22:47

## 2018-10-10 RX ADMIN — HEPARIN SODIUM 5000 UNIT(S): 5000 INJECTION INTRAVENOUS; SUBCUTANEOUS at 13:23

## 2018-10-10 RX ADMIN — NAFCILLIN 200 GRAM(S): 10 INJECTION, POWDER, FOR SOLUTION INTRAVENOUS at 08:02

## 2018-10-10 RX ADMIN — HEPARIN SODIUM 5000 UNIT(S): 5000 INJECTION INTRAVENOUS; SUBCUTANEOUS at 06:33

## 2018-10-10 RX ADMIN — NAFCILLIN 200 GRAM(S): 10 INJECTION, POWDER, FOR SOLUTION INTRAVENOUS at 17:05

## 2018-10-10 RX ADMIN — Medication 500 MILLIGRAM(S): at 06:33

## 2018-10-10 RX ADMIN — NAFCILLIN 200 GRAM(S): 10 INJECTION, POWDER, FOR SOLUTION INTRAVENOUS at 00:11

## 2018-10-10 RX ADMIN — FAMOTIDINE 20 MILLIGRAM(S): 10 INJECTION INTRAVENOUS at 13:23

## 2018-10-10 NOTE — DISCHARGE NOTE ADULT - ADDITIONAL INSTRUCTIONS
Please follow up with Dr. Neymar Garcia on Wednesday October 24th at 2:30PM.   Please follow up with Dr. Jenelle Adams from infectious diseases on Friday, October 19th at 9:40AM  Please continue to have your infusions of IV antibiotics, they will be monitored by Dr. Adams and Dr. Garcia.

## 2018-10-10 NOTE — PROGRESS NOTE ADULT - ASSESSMENT
The patient with non oliguric lisa - most likley fron JAIMIE vesrsus hypotension The patient with non oliguric lisa - most likley fron JAIMIE .  Alternatively, patient may have chronic renal insufficiency 2/2 HTN.  Endocarditis to be ruled out with scans and JENIFER and blood cultures.

## 2018-10-10 NOTE — DISCHARGE NOTE ADULT - NSFTFSERV1RD_GEN_ALL_CORE
wound care and assessment/medication teaching and assessment/observation and assessment/teaching and training

## 2018-10-10 NOTE — PROGRESS NOTE ADULT - SUBJECTIVE AND OBJECTIVE BOX
seen in the morning, no complains, making urine     Renal service for ANJU - got contrast on 10/4       Patient seen and examined at bedside.     acetaminophen   Tablet .. 650 milliGRAM(s) every 6 hours PRN  ALBUTerol    90 MICROgram(s) HFA Inhaler 1 Puff(s) every 4 hours  ALBUTerol/ipratropium for Nebulization 3 milliLiter(s) every 6 hours  atorvastatin 10 milliGRAM(s) at bedtime  bisacodyl 5 milliGRAM(s) every 12 hours  bisacodyl Suppository 10 milliGRAM(s) daily  docusate sodium 100 milliGRAM(s) three times a day  famotidine    Tablet 20 milliGRAM(s) daily  heparin  Injectable 5000 Unit(s) every 8 hours  metoclopramide Injectable 10 milliGRAM(s) every 6 hours PRN  nafcillin  IVPB     nafcillin  IVPB 2 Gram(s) every 4 hours  ondansetron Injectable 4 milliGRAM(s) every 6 hours PRN  senna 2 Tablet(s) at bedtime  sodium chloride 2 Gram(s) every 6 hours  tiotropium 18 MICROgram(s) Capsule 1 Capsule(s) daily      Allergies    No Known Allergies    Intolerances        T(C): , Max: 36.8 (10-09-18 @ 20:29)  T(F): , Max: 98.3 (10-09-18 @ 20:29)  HR: 96 (10-10-18 @ 09:59)  BP: 140/80 (10-10-18 @ 09:59)  BP(mean): --  RR: 18 (10-10-18 @ 09:59)  SpO2: 95% (10-10-18 @ 09:59)  Wt(kg): --    10-09 @ 07:01  -  10-10 @ 07:00  --------------------------------------------------------  IN:    Oral Fluid: 250 mL    sodium chloride 0.9%: 787.5 mL    Solution: 400 mL  Total IN: 1437.5 mL    OUT:    Voided: 950 mL  Total OUT: 950 mL    Total NET: 487.5 mL      10-10 @ 07:01  -  10-10 @ 10:35  --------------------------------------------------------  IN:    Oral Fluid: 200 mL  Total IN: 200 mL    OUT:  Total OUT: 0 mL    Total NET: 200 mL      Physical exam;   Alert and oriented   No JVD   Normal air entry into the lungs, no wheezing, noc rackles   RRR, normal s1/s2, no murmurs, rubs or gallops   Abdomen - soft, not tender, not distended,   extremities, no edema           LABS:                        12.8   7.6   )-----------( 260      ( 10 Oct 2018 06:53 )             36.5     10-10    139  |  105  |  31<H>  ----------------------------<  98  see note   |  20<L>  |  2.13<H>    Ca    7.7<L>      10 Oct 2018 06:53  Phos  4.0     10-09  Mg     2.5     10-09                  RADIOLOGY & ADDITIONAL STUDIES:

## 2018-10-10 NOTE — DISCHARGE NOTE ADULT - MEDICATION SUMMARY - MEDICATIONS TO TAKE
I will START or STAY ON the medications listed below when I get home from the hospital:    flush  -- 10 milliliter(s) intravenous every 4 hours - flush IV as per routine  -- Indication: For Wound infection    weekly labs: CBC, BMP, ESR, CRP   -- weekly lab draw: CBC, BMP, ESR and CRP   -- Indication: For Wound infection    acetaminophen 325 mg oral tablet  -- 3 tab(s) by mouth every 6 hours, As Needed  -- Indication: For BACK PAIN; WOUND INFECTION    Percocet 5/325 oral tablet  -- 1-2 tab(s) by mouth every 4 hours -for bronchospasm - for moderate pain MDD:12  -- Caution federal law prohibits the transfer of this drug to any person other  than the person for whom it was prescribed.  May cause drowsiness.  Alcohol may intensify this effect.  Use care when operating dangerous machinery.  This prescription cannot be refilled.  This product contains acetaminophen.  Do not use  with any other product containing acetaminophen to prevent possible liver damage.  Using more of this medication than prescribed may cause serious breathing problems.    -- Indication: For Bronchospasm and pain    heparin flush 1 units/mL intravenous solution  -- 1 unit(s) intravenously every 4 hours after each dose as per routine   -- Indication: For Wound infection    Lipitor 20 mg oral tablet  -- 1 tab(s) by mouth once a day  -- Indication: For HLD (hyperlipidemia)    albuterol 90 mcg/inh inhalation aerosol  -- 1 puff(s) inhaled every 4 hours, As needed, Shortness of Breath and/or Wheezing  -- Indication: For Asthma    famotidine 20 mg oral tablet  -- 1 tab(s) by mouth once a day  -- Indication: For GERD    senna oral tablet  -- 2 tab(s) by mouth once a day (at bedtime)  -- Indication: For Constipation    Colace 100 mg oral capsule  -- 1 cap(s) by mouth 3 times a day   -- Medication should be taken with plenty of water.    -- Indication: For Constipation    cyclobenzaprine 10 mg oral tablet  -- 1 tab(s) by mouth 3 times a day, As needed, Muscle Spasm MDD:3  -- Indication: For Muscle spasm    nafcillin 2 g/100 mL intravenous solution  -- 2 gram(s) intravenous every 4 hours   -- Indication: For Wound infection I will START or STAY ON the medications listed below when I get home from the hospital:    flush  -- 10 milliliter(s) intravenous every 4 hours - flush IV as per routine  -- Indication: For For IV antibiotics    weekly labs: CBC, BMP, ESR, CRP   -- weekly lab draw: CBC, BMP, ESR and CRP   -- Indication: For For IV antibiotics    acetaminophen 325 mg oral tablet  -- 3 tab(s) by mouth every 6 hours, As Needed  -- Indication: For Pain    Percocet 5/325 oral tablet  -- 1-2 tab(s) by mouth every 4 hours -for bronchospasm - for moderate pain MDD:12  -- Caution federal law prohibits the transfer of this drug to any person other  than the person for whom it was prescribed.  May cause drowsiness.  Alcohol may intensify this effect.  Use care when operating dangerous machinery.  This prescription cannot be refilled.  This product contains acetaminophen.  Do not use  with any other product containing acetaminophen to prevent possible liver damage.  Using more of this medication than prescribed may cause serious breathing problems.    -- Indication: For Pain    heparin flush 1 units/mL intravenous solution  -- 1 unit(s) intravenously every 4 hours after each dose as per routine   -- Indication: For For IV antibiotics    Lipitor 20 mg oral tablet  -- 1 tab(s) by mouth once a day  -- Indication: For HLD (hyperlipidemia)    albuterol 90 mcg/inh inhalation aerosol  -- 1 puff(s) inhaled every 4 hours, As needed, Shortness of Breath and/or Wheezing  -- Indication: For Asthma    famotidine 20 mg oral tablet  -- 1 tab(s) by mouth once a day  -- Indication: For GERD    senna oral tablet  -- 2 tab(s) by mouth once a day (at bedtime)  -- Indication: For Constipation    Colace 100 mg oral capsule  -- 1 cap(s) by mouth 3 times a day   -- Medication should be taken with plenty of water.    -- Indication: For Constipation    cyclobenzaprine 10 mg oral tablet  -- 1 tab(s) by mouth 3 times a day, As needed, Muscle Spasm MDD:3  -- Indication: For Muscle Spasms    nafcillin 2 g/100 mL intravenous solution  -- 2 gram(s) intravenous every 4 hours   -- Indication: For Cellulitis

## 2018-10-10 NOTE — DISCHARGE NOTE ADULT - CARE PLAN
Principal Discharge DX:	Sepsis  Goal:	to improve infection  Assessment and plan of treatment:	You had a lumbar spine wash out meaning they removed the source of infection in your back and the cultures were growing staph aureus. You have been being treated on nafcillin which is an antibiotic that will be administered through the IV which has been for 7 days now. Please continue to complete a total of 6-8 weeks of antibiotics. Please follow up with your PCP for further management and surveillance of this.  Secondary Diagnosis:	Wound infection  Goal:	to improve infection  Assessment and plan of treatment:	You had a lumbar spine wash out meaning they removed the source of infection in your back and the cultures were growing staph aureus. You have been being treated on nafcillin which is an antibiotic that will be administered through the IV which has been for 7 days now. Please continue to complete a total of 6-8 weeks of antibiotics. Please follow up with your PCP for further management and surveillance of this.  Secondary Diagnosis:	HTN (hypertension)  Goal:	to improve high blood pressure  Assessment and plan of treatment:	You were previously diagnosed with high blood pressure. Please continue on your home medications at this time and follow up with your PMD for further surveillance and management of your high blood pressure.  Secondary Diagnosis:	HLD (hyperlipidemia)  Goal:	to improve cholesterol  Assessment and plan of treatment:	You were previously diagnosed with high cholesterol. Please continue on your home medications at this time and follow up with your PMD for further surveillance and management of your high cholesterol.  Secondary Diagnosis:	Status post lumbar laminectomy  Goal:	to improve back pain  Assessment and plan of treatment:	You had a prior history of the laminectomy to improve your back pain. Please follow up with your PCP for further management and surveillance of your laminectomy.  Secondary Diagnosis:	Bacteremia  Goal:	to improve infection  Assessment and plan of treatment:	You had an infection in your blood called staph aureus. You were started on antibiotics that need a continued length for the next 6-8 weeks. Repeat blood cultures have been negative and you have no longer been spiking any fevers. Please follow up with your PCP for further management and surveillance of your bacteremia.  Secondary Diagnosis:	ANJU (acute kidney injury)  Goal:	to improve kidney function  Assessment and plan of treatment:	You came in with an elevated kidney creatinine most likely in the setting of sepsis and you were started on fluids which have improved your creatinine levels. Please follow up with your PCP for further management and surveillance of your creatinine function. Please avoid any medications that would affect your kidney. Principal Discharge DX:	Sepsis  Goal:	to improve infection  Assessment and plan of treatment:	You had a lumbar spine wash out meaning they removed the source of infection in your back and the cultures were growing staph aureus. You have been being treated on nafcillin which is an antibiotic that will be administered through the IV which has been for 7 days now. Please continue to complete a total of 6-8 weeks of antibiotics. Please follow up with your PCP for further management and surveillance of this.  Secondary Diagnosis:	Wound infection  Goal:	to improve infection  Assessment and plan of treatment:	You had a lumbar spine wash out meaning they removed the source of infection in your back and the cultures were growing staph aureus. You have been being treated on nafcillin which is an antibiotic that will be administered through the IV which has been for 7 days now. Please continue to complete a total of 6-8 weeks of antibiotics. Please follow up with your PCP for further management and surveillance of this.  Secondary Diagnosis:	HTN (hypertension)  Goal:	to improve high blood pressure  Assessment and plan of treatment:	You were previously diagnosed with high blood pressure. Please continue on your home medications at this time and follow up with your PMD for further surveillance and management of your high blood pressure.  Secondary Diagnosis:	HLD (hyperlipidemia)  Goal:	to improve cholesterol  Assessment and plan of treatment:	You were previously diagnosed with high cholesterol. Please continue on your home medications at this time and follow up with your PMD for further surveillance and management of your high cholesterol.  Secondary Diagnosis:	Status post lumbar laminectomy  Goal:	to improve back pain  Assessment and plan of treatment:	You had a prior history of the laminectomy to improve your back pain. Please follow up with your PCP for further management and surveillance of your laminectomy.  Secondary Diagnosis:	Bacteremia  Goal:	to improve infection  Assessment and plan of treatment:	You had an infection in your blood called staph aureus. You were started on antibiotics that need a continued length for the next 6-8 weeks. Repeat blood cultures have been negative and you have no longer been spiking any fevers. Please follow up with your PCP for further management and surveillance of your bacteremia.  Secondary Diagnosis:	ANJU (acute kidney injury)  Goal:	to improve kidney function  Assessment and plan of treatment:	You came in with an elevated kidney creatinine most likely in the setting of sepsis and you were started on fluids which have improved your creatinine levels. Please follow up with your PCP for further management and surveillance of your creatinine function. Please stop taking your benicar until you see your PCP. Please avoid any medications that would affect your kidney.

## 2018-10-10 NOTE — PROVIDER CONTACT NOTE (OTHER) - ACTION/TREATMENT ORDERED:
Reinforce with gauze and tegaderm.
Pt assessed by NP. No intervention at the moment.  Will continue to be monitored.
Will cancel howard order.
IV Fluids NS started, continued VS monitoring.

## 2018-10-10 NOTE — PROGRESS NOTE ADULT - PROBLEM SELECTOR PLAN 1
- non -oliguric ANJU   - improving of the renal function   - most likley from the contrast and hypotension   - volume status acceptable   - electrolytes noted   - can stop the iv fluids   - encourage oral intake   - no need for eosinophils in the urine nad C3 and c4   - in and outs   - daily weight   - renal diet

## 2018-10-10 NOTE — DISCHARGE NOTE ADULT - PATIENT PORTAL LINK FT
You can access the [facility] Patient Portal, offered by [facility], by registering with the following website: [email or web address]

## 2018-10-10 NOTE — DISCHARGE NOTE ADULT - CONDITIONS AT DISCHARGE
Patient seen and examined with house-staff during bedside rounds.  Resident note read, including vitals, physical findings, laboratory data, and radiological reports.   Revisions included below.  Direct personal management at bed side and extensive interpretation of the data.  Plan was outlined and discussed in details with the housestaff.  Decision making of high complexity  Action taken for acute disease activity to reflect the level of care provided:  - medication reconciliation  - review laboratory data  The Cr improved.  He is to be discharged and to follow with NS and PMD

## 2018-10-10 NOTE — DISCHARGE NOTE ADULT - PLAN OF CARE
to improve infection You had a lumbar spine wash out meaning they removed the source of infection in your back and the cultures were growing staph aureus. You have been being treated on nafcillin which is an antibiotic that will be administered through the IV which has been for 7 days now. Please continue to complete a total of 6-8 weeks of antibiotics. Please follow up with your PCP for further management and surveillance of this. to improve high blood pressure You were previously diagnosed with high blood pressure. Please continue on your home medications at this time and follow up with your PMD for further surveillance and management of your high blood pressure. to improve cholesterol You were previously diagnosed with high cholesterol. Please continue on your home medications at this time and follow up with your PMD for further surveillance and management of your high cholesterol. to improve back pain You had a prior history of the laminectomy to improve your back pain. Please follow up with your PCP for further management and surveillance of your laminectomy. You had an infection in your blood called staph aureus. You were started on antibiotics that need a continued length for the next 6-8 weeks. Repeat blood cultures have been negative and you have no longer been spiking any fevers. Please follow up with your PCP for further management and surveillance of your bacteremia. to improve kidney function You came in with an elevated kidney creatinine most likely in the setting of sepsis and you were started on fluids which have improved your creatinine levels. Please follow up with your PCP for further management and surveillance of your creatinine function. Please avoid any medications that would affect your kidney. You came in with an elevated kidney creatinine most likely in the setting of sepsis and you were started on fluids which have improved your creatinine levels. Please follow up with your PCP for further management and surveillance of your creatinine function. Please stop taking your benicar until you see your PCP. Please avoid any medications that would affect your kidney.

## 2018-10-10 NOTE — PROGRESS NOTE ADULT - SUBJECTIVE AND OBJECTIVE BOX
ID FOLLOW UP NOTE     INTERVAL HPI: Patient complains of hiccups and difficulty expressing his words throughout hospital course which is new for him. Yesterday JENIFER resulted with "possible aortic root abscess." Will need further evaluation.     BACK PAIN; WOUND INFECTION    HLD (hyperlipidemia)  HTN (hypertension)  CAD (coronary artery disease)  Constipation  Hiccups  Hyponatremia  ANJU (acute kidney injury)  Preoperative clearance  Wound infection      ROS:  negative except as above.     Allergies    No Known Allergies    Intolerances        ANTIBIOTICS/RELEVANT:  antimicrobials  nafcillin  IVPB      nafcillin  IVPB 2 Gram(s) IV Intermittent every 4 hours    immunologic:    OTHER:  acetaminophen   Tablet .. 650 milliGRAM(s) Oral every 6 hours PRN  ALBUTerol    90 MICROgram(s) HFA Inhaler 1 Puff(s) Inhalation every 4 hours  ALBUTerol/ipratropium for Nebulization 3 milliLiter(s) Nebulizer every 6 hours  atorvastatin 10 milliGRAM(s) Oral at bedtime  bisacodyl 5 milliGRAM(s) Oral every 12 hours  bisacodyl Suppository 10 milliGRAM(s) Rectal daily  docusate sodium 100 milliGRAM(s) Oral three times a day  famotidine    Tablet 20 milliGRAM(s) Oral daily  heparin  Injectable 5000 Unit(s) SubCutaneous every 8 hours  metoclopramide Injectable 10 milliGRAM(s) IV Push every 6 hours PRN  ondansetron Injectable 4 milliGRAM(s) IV Push every 6 hours PRN  senna 2 Tablet(s) Oral at bedtime  sodium chloride 2 Gram(s) Oral every 6 hours  tiotropium 18 MICROgram(s) Capsule 1 Capsule(s) Inhalation daily      Objective:  Vital Signs Last 24 Hrs  T(C): 36.6 (10 Oct 2018 14:32), Max: 36.7 (10 Oct 2018 05:21)  T(F): 97.8 (10 Oct 2018 14:32), Max: 98 (10 Oct 2018 05:21)  HR: 97 (10 Oct 2018 14:32) (81 - 97)  BP: 140/74 (10 Oct 2018 14:32) (123/71 - 157/81)  BP(mean): --  RR: 17 (10 Oct 2018 14:32) (16 - 18)  SpO2: 97% (10 Oct 2018 14:32) (95% - 97%)    PHYSICAL EXAM:  Constitutional: Uncomfortable appearing with hiccups persistent.   Eyes: ALICIA, EOMI  Ear/Nose/Throat: no oral lesion, no sinus tenderness on percussion	  Neck:no JVD, no lymphadenopathy, supple  Respiratory: Bibasilar inspiratory crackles.   Cardiovascular: S1S2 RRR, no murmurs  Gastrointestinal:soft, (+) BS, no HSM  Extremities:no e/e/c  Neuro: Bilateral LE strength 5/5.         LABS:                        12.8   7.6   )-----------( 260      ( 10 Oct 2018 06:53 )             36.5     10-10    139  |  105  |  31<H>  ----------------------------<  98  see note   |  20<L>  |  2.13<H>    Ca    7.7<L>      10 Oct 2018 06:53  Phos  4.0     10-09  Mg     2.5     10-09              MICROBIOLOGY: No new pertinent microbio            RADIOLOGY & ADDITIONAL STUDIES: JENIFER noted

## 2018-10-10 NOTE — PROGRESS NOTE ADULT - SUBJECTIVE AND OBJECTIVE BOX
Interval Events: Reviewed  Patient seen and examined at bedside.    Patient is a 83y old  Male who presents with a chief complaint of Wound draiange (10 Oct 2018 20:31)    Is doing better. He did physical therapy. Had a bowel movement  PAST MEDICAL & SURGICAL HISTORY:  Asthma  BPH (benign prostatic hyperplasia)  Chronic bilateral low back pain with bilateral sciatica  Claudication  HLD (hyperlipidemia)  CAD (coronary artery disease): coronory stents x 2  HTN (hypertension)  H/O laminectomy  Status post lumbar laminectomy  History of appendectomy  History of partial thyroidectomy  History of percutaneous transluminal coronary angioplasty      MEDICATIONS:  Pulmonary:  ALBUTerol    90 MICROgram(s) HFA Inhaler 1 Puff(s) Inhalation every 4 hours  ALBUTerol/ipratropium for Nebulization 3 milliLiter(s) Nebulizer every 6 hours  tiotropium 18 MICROgram(s) Capsule 1 Capsule(s) Inhalation daily    Antimicrobials:  nafcillin  IVPB      nafcillin  IVPB 2 Gram(s) IV Intermittent every 4 hours    Anticoagulants:  heparin  Injectable 5000 Unit(s) SubCutaneous every 8 hours    Cardiac:      Allergies    No Known Allergies    Intolerances        Vital Signs Last 24 Hrs  T(C): 36.6 (10 Oct 2018 14:32), Max: 36.7 (10 Oct 2018 05:21)  T(F): 97.8 (10 Oct 2018 14:32), Max: 98 (10 Oct 2018 05:21)  HR: 97 (10 Oct 2018 14:32) (81 - 97)  BP: 140/74 (10 Oct 2018 14:32) (123/71 - 157/81)  BP(mean): --  RR: 17 (10 Oct 2018 14:32) (16 - 18)  SpO2: 97% (10 Oct 2018 14:32) (95% - 97%)    10-09 @ 07:01  -  10-10 @ 07:00  --------------------------------------------------------  IN: 1437.5 mL / OUT: 950 mL / NET: 487.5 mL    10-10 @ 07:01  -  10-10 @ 21:45  --------------------------------------------------------  IN: 200 mL / OUT: 150 mL / NET: 50 mL          LABS:      CBC Full  -  ( 10 Oct 2018 06:53 )  WBC Count : 7.6 K/uL  Hemoglobin : 12.8 g/dL  Hematocrit : 36.5 %  Platelet Count - Automated : 260 K/uL  Mean Cell Volume : 88.6 fL  Mean Cell Hemoglobin : 31.1 pg  Mean Cell Hemoglobin Concentration : 35.1 g/dL  Auto Neutrophil # : x  Auto Lymphocyte # : x  Auto Monocyte # : x  Auto Eosinophil # : x  Auto Basophil # : x  Auto Neutrophil % : 65.0 %  Auto Lymphocyte % : 21.0 %  Auto Monocyte % : 8.0 %  Auto Eosinophil % : 2.0 %  Auto Basophil % : x    10-10    139  |  105  |  31<H>  ----------------------------<  98  see note   |  20<L>  |  2.13<H>    Ca    7.7<L>      10 Oct 2018 06:53  Phos  4.0     10-09  Mg     2.5     10-09                          RADIOLOGY & ADDITIONAL STUDIES (The following images were personally reviewed):  Rousseau:                                     No  Urine output:                       adequate  DVT prophylaxis:                 Yes  Flattus:                                  Yes  Bowel movement:              yes

## 2018-10-10 NOTE — PROGRESS NOTE ADULT - PROBLEM SELECTOR PLAN 7
cultures grew MSSA, and blood culture is negative continue nafcillin a    Repeat blood culture is negative. Patient is to get a PICC line for six weeks of IV antibiotic.  Patient is for gallium scam. I inserted IV in the left arm

## 2018-10-10 NOTE — PROGRESS NOTE ADULT - SUBJECTIVE AND OBJECTIVE BOX
HPI:  84yo left handed male with PMH CAD HDL HTN s/p 9/21/2018 Lumbar lami and discharge with home care. Pt doing well until yesterday noted pain right flank and RLQ pain. Distal end of wound noted to be dehiscence and purulent drainage. Pt chills, afebrile. Denies urinary or bowel dysfunction. (04 Oct 2018 14:18)      Hospital course:   POD:     Vital Signs Last 24 Hrs  T(C): 36.3 (10 Oct 2018 09:59), Max: 36.8 (09 Oct 2018 20:29)  T(F): 97.4 (10 Oct 2018 09:59), Max: 98.3 (09 Oct 2018 20:29)  HR: 96 (10 Oct 2018 09:59) (81 - 96)  BP: 140/80 (10 Oct 2018 09:59) (123/71 - 140/80)  BP(mean): --  RR: 18 (10 Oct 2018 09:59) (16 - 18)  SpO2: 95% (10 Oct 2018 09:59) (95% - 97%)    I&O's Summary    09 Oct 2018 07:01  -  10 Oct 2018 07:00  --------------------------------------------------------  IN: 1437.5 mL / OUT: 950 mL / NET: 487.5 mL    10 Oct 2018 07:01  -  10 Oct 2018 11:21  --------------------------------------------------------  IN: 200 mL / OUT: 0 mL / NET: 200 mL        PHYSICAL EXAM:  Neurological: AOx3, NAD, FC, speech coherent   CN II-XII: EOMI, PERRL, face symmetric, tongue midline   Motor: MAEx4 5/5 UE and LE B/L   SILT throughout  WWP throughout   No pronator drift   Incision site: dressing C/D/I, no erythema, edema or purulent drainage   Cardiovascular: regular rate   Respiratory: unlabored breathing on RA   Gastrointestinal: abd soft, NT, ND   Genitourinary: ___ howard in place   Extremities: no LE edema     TUBES/LINES:  [] Howard  [] Lumbar Drain  [] Wound Drains  [] NGT   [] EVD   [] CVC  [] Other      DIET:  [] NPO  [x] Mechanical  [] Tube feeds    LABS:                        12.8   7.6   )-----------( 260      ( 10 Oct 2018 06:53 )             36.5     10-10    139  |  105  |  31<H>  ----------------------------<  98  see note   |  20<L>  |  2.13<H>    Ca    7.7<L>      10 Oct 2018 06:53  Phos  4.0     10-09  Mg     2.5     10-09              CAPILLARY BLOOD GLUCOSE          Drug Levels: [] N/A    CSF Analysis: [] N/A      Allergies    No Known Allergies    Intolerances        Home Medications:  acetaminophen 325 mg oral tablet: 3 tab(s) orally every 6 hours (04 Oct 2018 13:51)  Benicar 20 mg oral tablet: 1 tab(s) orally once a day (04 Oct 2018 13:51)  Lipitor 20 mg oral tablet: 1 tab(s) orally once a day (04 Oct 2018 13:51)      MEDICATIONS:  MEDICATIONS  (STANDING):  ALBUTerol    90 MICROgram(s) HFA Inhaler 1 Puff(s) Inhalation every 4 hours  ALBUTerol/ipratropium for Nebulization 3 milliLiter(s) Nebulizer every 6 hours  atorvastatin 10 milliGRAM(s) Oral at bedtime  bisacodyl 5 milliGRAM(s) Oral every 12 hours  bisacodyl Suppository 10 milliGRAM(s) Rectal daily  docusate sodium 100 milliGRAM(s) Oral three times a day  famotidine    Tablet 20 milliGRAM(s) Oral daily  heparin  Injectable 5000 Unit(s) SubCutaneous every 8 hours  nafcillin  IVPB      nafcillin  IVPB 2 Gram(s) IV Intermittent every 4 hours  senna 2 Tablet(s) Oral at bedtime  sodium chloride 2 Gram(s) Oral every 6 hours  tiotropium 18 MICROgram(s) Capsule 1 Capsule(s) Inhalation daily    MEDICATIONS  (PRN):  acetaminophen   Tablet .. 650 milliGRAM(s) Oral every 6 hours PRN Temp greater or equal to 38C (100.4F), Mild Pain (1 - 3)  metoclopramide Injectable 10 milliGRAM(s) IV Push every 6 hours PRN Hiccups  ondansetron Injectable 4 milliGRAM(s) IV Push every 6 hours PRN Nausea and/or Vomiting      CULTURES:  Culture Results:   No growth at 2 days. (10-07 @ 14:59)  Culture Results:   No growth at 2 days. (10-07 @ 14:59)      RADIOLOGY & ADDITIONAL TESTS:      ASSESSMENT:  83y Male s/p    PLAN:  NEURO:  - Q_ neuro checks   - Cont. decadron   - Cont.  Keppra   - Elev HOB 30 deg   - Dc staples ___    CARDIOVASCULAR:  - BP goal: normotension     PULMONARY:  - IS     RENAL:  - I&Os   - Replete lytes prn     GI:  - Bowel regimen     HEME:  - Trend H/H     ID:  - Monitor for fevers   - Trend WBC    ENDO:  - ISS     DVT PROPHYLAXIS:  [x] Venodynes                                [x] Heparin/Lovenox    DISPOSITION:  - SDU status   - Full code   - Dispo: pending placement   - D/w  HPI:  84yo left handed male with PMH CAD HDL HTN s/p 9/21/2018 Lumbar lami and discharge with home care. Pt doing well until yesterday noted pain right flank and RLQ pain. Distal end of wound noted to be dehiscence and purulent drainage. Pt chills, afebrile. Denies urinary or bowel dysfunction. (04 Oct 2018 14:18)      Hospital course:   10/5: s/p lumbar wound washout  10/6: febrile to 102 overnight. Creatinine bump today 1.4 from 1.1, cont IVF @125, sputum cx sent, keep KEITH drain in place o/p 75cc/12 hours and 85cc/24 hours, started on salt tabs for Na 134, started on percs and dilaudid PCA d/c'd. OR and wound cx growing MSSA, cont naficillin and cefepime per ID  10/7: On Reglan for hiccups, cont pain meds. PT/OT evaluation. Post op Blood cultures positive for staph, pending final results.  10/8: Cont. cefepime   10/9: JENIFER today - shows aortic root thickening, cannot rule out abscess   10/10: Blood cultures NGTD from 10/7, new IV placed today, pending PICC     Vital Signs Last 24 Hrs  T(C): 36.3 (10 Oct 2018 09:59), Max: 36.8 (09 Oct 2018 20:29)  T(F): 97.4 (10 Oct 2018 09:59), Max: 98.3 (09 Oct 2018 20:29)  HR: 96 (10 Oct 2018 09:59) (81 - 96)  BP: 140/80 (10 Oct 2018 09:59) (123/71 - 140/80)  BP(mean): --  RR: 18 (10 Oct 2018 09:59) (16 - 18)  SpO2: 95% (10 Oct 2018 09:59) (95% - 97%)    I&O's Summary    09 Oct 2018 07:01  -  10 Oct 2018 07:00  --------------------------------------------------------  IN: 1437.5 mL / OUT: 950 mL / NET: 487.5 mL    10 Oct 2018 07:01  -  10 Oct 2018 11:21  --------------------------------------------------------  IN: 200 mL / OUT: 0 mL / NET: 200 mL        PHYSICAL EXAM:  Neurological: AOx3, NAD, FC, speech coherent   CN II-XII: EOMI, PERRL, face symmetric, tongue midline   Motor: MAEx4 5/5 UE and LE B/L   SILT throughout  WWP throughout   No pronator drift   Incision site: dressing soaked this am, changed to dry gauze/tegederm, no erythema, edema or purulent drainage   Cardiovascular: regular rate   Respiratory: unlabored breathing on RA, hiccups   Gastrointestinal: abd soft, NT, ND   Genitourinary: no howard in place   Extremities: no LE edema, left hand erythema / edema from infiltrated IV     TUBES/LINES:  [] Howard  [] Lumbar Drain  [] Wound Drains  [] NGT   [] EVD   [] CVC  [] Other      DIET:  [] NPO  [x] Mechanical  [] Tube feeds    LABS:                        12.8   7.6   )-----------( 260      ( 10 Oct 2018 06:53 )             36.5     10-10    139  |  105  |  31<H>  ----------------------------<  98  see note   |  20<L>  |  2.13<H>    Ca    7.7<L>      10 Oct 2018 06:53  Phos  4.0     10-09  Mg     2.5     10-09              CAPILLARY BLOOD GLUCOSE          Drug Levels: [] N/A    CSF Analysis: [] N/A      Allergies    No Known Allergies    Intolerances        Home Medications:  acetaminophen 325 mg oral tablet: 3 tab(s) orally every 6 hours (04 Oct 2018 13:51)  Benicar 20 mg oral tablet: 1 tab(s) orally once a day (04 Oct 2018 13:51)  Lipitor 20 mg oral tablet: 1 tab(s) orally once a day (04 Oct 2018 13:51)      MEDICATIONS:  MEDICATIONS  (STANDING):  ALBUTerol    90 MICROgram(s) HFA Inhaler 1 Puff(s) Inhalation every 4 hours  ALBUTerol/ipratropium for Nebulization 3 milliLiter(s) Nebulizer every 6 hours  atorvastatin 10 milliGRAM(s) Oral at bedtime  bisacodyl 5 milliGRAM(s) Oral every 12 hours  bisacodyl Suppository 10 milliGRAM(s) Rectal daily  docusate sodium 100 milliGRAM(s) Oral three times a day  famotidine    Tablet 20 milliGRAM(s) Oral daily  heparin  Injectable 5000 Unit(s) SubCutaneous every 8 hours  nafcillin  IVPB      nafcillin  IVPB 2 Gram(s) IV Intermittent every 4 hours  senna 2 Tablet(s) Oral at bedtime  sodium chloride 2 Gram(s) Oral every 6 hours  tiotropium 18 MICROgram(s) Capsule 1 Capsule(s) Inhalation daily    MEDICATIONS  (PRN):  acetaminophen   Tablet .. 650 milliGRAM(s) Oral every 6 hours PRN Temp greater or equal to 38C (100.4F), Mild Pain (1 - 3)  metoclopramide Injectable 10 milliGRAM(s) IV Push every 6 hours PRN Hiccups  ondansetron Injectable 4 milliGRAM(s) IV Push every 6 hours PRN Nausea and/or Vomiting      CULTURES:  Culture Results:   No growth at 2 days. (10-07 @ 14:59)  Culture Results:   No growth at 2 days. (10-07 @ 14:59)      RADIOLOGY & ADDITIONAL TESTS:      ASSESSMENT:  83y Male s/p lumbar laminectomy L2-L4 with wound dehiscence and washout 10/5, now pending galium scan to r/o aortic root abscess and PICC line placement for home abx infusion.     PLAN:  NEURO:  - Cont. neuro checks   - Wound draining slightly, dressing changed   - Pain control   - PT/OT/OOB     CARDIOVASCULAR:  - BP goal: normotension   - cont atorvastatin     PULMONARY:  - IS   - Nebs prn     RENAL:  - I&Os   - Replete lytes prn   - Cont salt tabs   - Trend Creatinine: trending down   - F/u renal US   - IVF d/c'd     GI:  - Bowel regimen   - Regular diet   - Famotidine     HEME:  - Trend H/H     ID:  - Monitor for fevers   - Trend WBC  - Cont. cefepime (tentative stop date 12/2)   - Keflex d/c;d   - Gallium scan tomorrow   - PICC line once aortic root abscess ruled out   - Set up IV infusion for home abx   - F/u ID recs   - Repeat blood cx if spikes fever again   - Blood cultures negative from 10/7     ENDO:  - ISS     DVT PROPHYLAXIS:  [x] Venodynes                                [x] Heparin/Lovenox    DISPOSITION:  - Floor status   - Full code   - Dispo: home after PICC and gallium scan   - D/w Dr. Diaz

## 2018-10-10 NOTE — PROGRESS NOTE ADULT - PROBLEM SELECTOR PLAN 8
Cr increased on IV fluids and most likely related to infection  Renal consult as the renal function is worsening  ordered renal US unremarkable. The creatinine rise decreased in most likely this is related to his sepsis and hopefully was start to plateau and come down patient has adequate

## 2018-10-10 NOTE — DISCHARGE NOTE ADULT - MEDICATION SUMMARY - MEDICATIONS TO STOP TAKING
I will STOP taking the medications listed below when I get home from the hospital:    Benicar 20 mg oral tablet  -- 1 tab(s) by mouth once a day    Medrol Dosepak 4 mg oral tablet  -- 1 packet(s) by mouth once a day   -- It is very important that you take or use this exactly as directed.  Do not skip doses or discontinue unless directed by your doctor.  Obtain medical advice before taking any non-prescription drugs as some may affect the action of this medication.  Take with food or milk.

## 2018-10-10 NOTE — DISCHARGE NOTE ADULT - SECONDARY DIAGNOSIS.
Wound infection HTN (hypertension) HLD (hyperlipidemia) Status post lumbar laminectomy Bacteremia ANJU (acute kidney injury)

## 2018-10-10 NOTE — DISCHARGE NOTE ADULT - CARE PROVIDER_API CALL
Leroy Diaz), Neurological Surgery  [address]  [address]  [place] [address]  Phone: [phone]  Fax: [phone]    Jenelle Adams), Infectious Disease; Internal Medicine  [address]  [address] [place] [address]  Phone: [phone]  Fax: [phone]

## 2018-10-10 NOTE — DISCHARGE NOTE ADULT - HOSPITAL COURSE
This is a 84 y/o male with CAD s/p stents (on home aspirin), HTN, HLD, s/p laminectomy (9/21) who presented 10/4 found to be in severe sepsis 2/2 lumbar wound and MSSA bacteremia. Patient with lumbar wash-out with +wound cultures for MSSA. Patient started on 7 days of nafcillin to be completed for 6-8 weeks with PICC line which was administered on 10/11. Hospital course complicated by JENIFER which could not r/o aortic abscess and patient underwent gallium scan which showed no aortic abscess. Patient's hospital course also complicated by hiccups in which a CXR showed elevated right hemidiaphragm. Patient trialed on medications without any relief. This is a 82 y/o male with CAD s/p stents (on home aspirin), HTN, HLD, s/p laminectomy (9/21) who presented 10/4 found to be in severe sepsis 2/2 lumbar wound and MSSA bacteremia. Patient with lumbar wash-out with +wound cultures for MSSA. Patient started on 7 days of nafcillin to be completed for 6-8 weeks with PICC line which was administered on 10/11. Hospital course complicated by JENIFER which could not r/o aortic abscess and patient underwent gallium scan which showed no aortic abscess. Patient's hospital course also complicated by hiccups in which a CXR showed elevated right hemidiaphragm. Patient trialed on medications without any relief. Patient will complete 6-8 weeks of IV nafcillin for which he will be monitored by Dr. Adams from ID and his PCP. At the time of discharge he was HDS and afebrile.

## 2018-10-10 NOTE — PROGRESS NOTE ADULT - ASSESSMENT
84 yo M with HTN, CAD s/p stents s/p lumbar laminectomy 9/21 now with skin and soft tissue infection s/p I&D on 10/5 with wound culture growing MSSA, also with MSSA bacteremia X3 days now with cultures from 10/7 showing NGTD. JENIFER done yesterday, though no valvular disease noted, aortic root thickening noted which was read as "cannot rule out aortic root abscess" with clinical correlation advised.     Recommendations are as follows:    1. JENIFER noted- read as "Cannot rule out aortic root abscess. Clinical correlation suggested." Recommend gallium scan to further evaluate.   2. C/w nafcillin 2 g IV q4h- will wait on gallium scan before commenting on duration.   3. BC 10/7 NGTD    ID will continue to follow with you.

## 2018-10-11 DIAGNOSIS — Z91.89 OTHER SPECIFIED PERSONAL RISK FACTORS, NOT ELSEWHERE CLASSIFIED: ICD-10-CM

## 2018-10-11 DIAGNOSIS — R63.8 OTHER SYMPTOMS AND SIGNS CONCERNING FOOD AND FLUID INTAKE: ICD-10-CM

## 2018-10-11 LAB
ANION GAP SERPL CALC-SCNC: 13 MMOL/L — SIGNIFICANT CHANGE UP (ref 5–17)
ANION GAP SERPL CALC-SCNC: 16 MMOL/L — SIGNIFICANT CHANGE UP (ref 5–17)
BASOPHILS NFR BLD AUTO: 0.5 % — SIGNIFICANT CHANGE UP (ref 0–2)
BUN SERPL-MCNC: 25 MG/DL — HIGH (ref 7–23)
BUN SERPL-MCNC: 26 MG/DL — HIGH (ref 7–23)
CALCIUM SERPL-MCNC: 8.4 MG/DL — SIGNIFICANT CHANGE UP (ref 8.4–10.5)
CALCIUM SERPL-MCNC: 8.6 MG/DL — SIGNIFICANT CHANGE UP (ref 8.4–10.5)
CHLORIDE SERPL-SCNC: 102 MMOL/L — SIGNIFICANT CHANGE UP (ref 96–108)
CHLORIDE SERPL-SCNC: 105 MMOL/L — SIGNIFICANT CHANGE UP (ref 96–108)
CO2 SERPL-SCNC: 21 MMOL/L — LOW (ref 22–31)
CO2 SERPL-SCNC: 24 MMOL/L — SIGNIFICANT CHANGE UP (ref 22–31)
CREAT SERPL-MCNC: 1.93 MG/DL — HIGH (ref 0.5–1.3)
CREAT SERPL-MCNC: 1.99 MG/DL — HIGH (ref 0.5–1.3)
CULTURE RESULTS: SIGNIFICANT CHANGE UP
CULTURE RESULTS: SIGNIFICANT CHANGE UP
EOSINOPHIL NFR BLD AUTO: 3.7 % — SIGNIFICANT CHANGE UP (ref 0–6)
GLUCOSE SERPL-MCNC: 120 MG/DL — HIGH (ref 70–99)
GLUCOSE SERPL-MCNC: 151 MG/DL — HIGH (ref 70–99)
HCT VFR BLD CALC: 36.5 % — LOW (ref 39–50)
HGB BLD-MCNC: 12.8 G/DL — LOW (ref 13–17)
LYMPHOCYTES # BLD AUTO: 10.1 % — LOW (ref 13–44)
MAGNESIUM SERPL-MCNC: 2.4 MG/DL — SIGNIFICANT CHANGE UP (ref 1.6–2.6)
MCHC RBC-ENTMCNC: 31.1 PG — SIGNIFICANT CHANGE UP (ref 27–34)
MCHC RBC-ENTMCNC: 35.1 G/DL — SIGNIFICANT CHANGE UP (ref 32–36)
MCV RBC AUTO: 88.8 FL — SIGNIFICANT CHANGE UP (ref 80–100)
MONOCYTES NFR BLD AUTO: 10.9 % — SIGNIFICANT CHANGE UP (ref 2–14)
NEUTROPHILS NFR BLD AUTO: 74.8 % — SIGNIFICANT CHANGE UP (ref 43–77)
ORGANISM # SPEC MICROSCOPIC CNT: SIGNIFICANT CHANGE UP
PHOSPHATE SERPL-MCNC: 3 MG/DL — SIGNIFICANT CHANGE UP (ref 2.5–4.5)
PLATELET # BLD AUTO: 273 K/UL — SIGNIFICANT CHANGE UP (ref 150–400)
POTASSIUM SERPL-MCNC: 4 MMOL/L — SIGNIFICANT CHANGE UP (ref 3.5–5.3)
POTASSIUM SERPL-MCNC: 4.3 MMOL/L — SIGNIFICANT CHANGE UP (ref 3.5–5.3)
POTASSIUM SERPL-SCNC: 4 MMOL/L — SIGNIFICANT CHANGE UP (ref 3.5–5.3)
POTASSIUM SERPL-SCNC: 4.3 MMOL/L — SIGNIFICANT CHANGE UP (ref 3.5–5.3)
RBC # BLD: 4.11 M/UL — LOW (ref 4.2–5.8)
RBC # FLD: 15.1 % — SIGNIFICANT CHANGE UP (ref 10.3–16.9)
SODIUM SERPL-SCNC: 139 MMOL/L — SIGNIFICANT CHANGE UP (ref 135–145)
SODIUM SERPL-SCNC: 142 MMOL/L — SIGNIFICANT CHANGE UP (ref 135–145)
SPECIMEN SOURCE: SIGNIFICANT CHANGE UP
SPECIMEN SOURCE: SIGNIFICANT CHANGE UP
WBC # BLD: 8.6 K/UL — SIGNIFICANT CHANGE UP (ref 3.8–10.5)
WBC # FLD AUTO: 8.6 K/UL — SIGNIFICANT CHANGE UP (ref 3.8–10.5)

## 2018-10-11 PROCEDURE — 36569 INSJ PICC 5 YR+ W/O IMAGING: CPT

## 2018-10-11 PROCEDURE — 76937 US GUIDE VASCULAR ACCESS: CPT | Mod: 26

## 2018-10-11 PROCEDURE — 99233 SBSQ HOSP IP/OBS HIGH 50: CPT | Mod: GC

## 2018-10-11 PROCEDURE — 78807: CPT | Mod: 26

## 2018-10-11 PROCEDURE — 78806: CPT | Mod: 26

## 2018-10-11 PROCEDURE — 77001 FLUOROGUIDE FOR VEIN DEVICE: CPT | Mod: 26

## 2018-10-11 RX ORDER — SODIUM CHLORIDE 9 MG/ML
1000 INJECTION INTRAMUSCULAR; INTRAVENOUS; SUBCUTANEOUS
Qty: 0 | Refills: 0 | Status: DISCONTINUED | OUTPATIENT
Start: 2018-10-11 | End: 2018-10-12

## 2018-10-11 RX ORDER — METOCLOPRAMIDE HCL 10 MG
10 TABLET ORAL ONCE
Qty: 0 | Refills: 0 | Status: COMPLETED | OUTPATIENT
Start: 2018-10-11 | End: 2018-10-11

## 2018-10-11 RX ORDER — SODIUM CHLORIDE 9 MG/ML
1000 INJECTION INTRAMUSCULAR; INTRAVENOUS; SUBCUTANEOUS
Qty: 0 | Refills: 0 | Status: DISCONTINUED | OUTPATIENT
Start: 2018-10-11 | End: 2018-10-11

## 2018-10-11 RX ADMIN — NAFCILLIN 200 GRAM(S): 10 INJECTION, POWDER, FOR SOLUTION INTRAVENOUS at 02:05

## 2018-10-11 RX ADMIN — NAFCILLIN 200 GRAM(S): 10 INJECTION, POWDER, FOR SOLUTION INTRAVENOUS at 21:12

## 2018-10-11 RX ADMIN — Medication 3 MILLILITER(S): at 11:48

## 2018-10-11 RX ADMIN — NAFCILLIN 200 GRAM(S): 10 INJECTION, POWDER, FOR SOLUTION INTRAVENOUS at 06:07

## 2018-10-11 RX ADMIN — Medication 10 MILLIGRAM(S): at 15:31

## 2018-10-11 RX ADMIN — NAFCILLIN 200 GRAM(S): 10 INJECTION, POWDER, FOR SOLUTION INTRAVENOUS at 15:31

## 2018-10-11 RX ADMIN — HEPARIN SODIUM 5000 UNIT(S): 5000 INJECTION INTRAVENOUS; SUBCUTANEOUS at 22:51

## 2018-10-11 RX ADMIN — SODIUM CHLORIDE 1 GRAM(S): 9 INJECTION INTRAMUSCULAR; INTRAVENOUS; SUBCUTANEOUS at 06:08

## 2018-10-11 RX ADMIN — Medication 10 MILLIGRAM(S): at 06:07

## 2018-10-11 RX ADMIN — ATORVASTATIN CALCIUM 10 MILLIGRAM(S): 80 TABLET, FILM COATED ORAL at 22:51

## 2018-10-11 RX ADMIN — HEPARIN SODIUM 5000 UNIT(S): 5000 INJECTION INTRAVENOUS; SUBCUTANEOUS at 15:24

## 2018-10-11 RX ADMIN — SODIUM CHLORIDE 100 MILLILITER(S): 9 INJECTION INTRAMUSCULAR; INTRAVENOUS; SUBCUTANEOUS at 19:56

## 2018-10-11 RX ADMIN — NAFCILLIN 200 GRAM(S): 10 INJECTION, POWDER, FOR SOLUTION INTRAVENOUS at 11:48

## 2018-10-11 NOTE — PROGRESS NOTE ADULT - PROBLEM SELECTOR PROBLEM 4
ANJU (acute kidney injury)
Postoperative state
Hiccups
Postoperative state

## 2018-10-11 NOTE — PROGRESS NOTE ADULT - PROBLEM SELECTOR PROBLEM 6
HLD (hyperlipidemia)
Elevated hemidiaphragm
CAD (coronary artery disease)
Elevated hemidiaphragm

## 2018-10-11 NOTE — PROGRESS NOTE ADULT - ASSESSMENT
This is a 84 y/o obese male with PMH CAD s/p stents (on home aspirin), HTN, HLD, sciatica s/p laminectomy 9/21 who presented to hospital 10/4/18 with progressive right flank pain found to be in severe sepsis 2/2 wound in lumbar spine who underwent lumbar wash out with blood and wound cultures +MSSA. Currently on day 7 of nafcillin s/p PICC line placed today 10/11, to be continued on antibiotics for 6-8 weeks.

## 2018-10-11 NOTE — PROGRESS NOTE ADULT - PROBLEM SELECTOR PROBLEM 3
Wound infection
Coronary artery disease involving native coronary artery of native heart without angina pectoris
Hyponatremia
Coronary artery disease involving native coronary artery of native heart without angina pectoris

## 2018-10-11 NOTE — PROGRESS NOTE ADULT - PROBLEM SELECTOR PLAN 9
F: 80cc/hour NS   E: Replete for K<4 and Mg<2   N: DASH/TLC diet F: 100cc/hour NS   E: Replete for K<4 and Mg<2   N: DASH/TLC diet

## 2018-10-11 NOTE — PROGRESS NOTE ADULT - PROBLEM SELECTOR PLAN 5
BP meds held in setting of sepsis   will restart once normotensive       #R/O Aortic Abscess  JENIFER result: cannot r/o abscess  Patient underwent gallium scan to rule in abscess BP meds held in setting of sepsis   will restart once normotensive       #R/O Aortic Abscess  JENIFER result: cannot r/o abscess  Patient underwent gallium scan to rule in abscess and result is negative

## 2018-10-11 NOTE — PROGRESS NOTE ADULT - PROBLEM SELECTOR PROBLEM 8
Hyponatremia
ANJU (acute kidney injury)
HLD (hyperlipidemia)
ANJU (acute kidney injury)

## 2018-10-11 NOTE — PROGRESS NOTE ADULT - SUBJECTIVE AND OBJECTIVE BOX
ID FOLLOW UP NOTE    INTERVAL HPI: Patient reports no back pain. Still with hiccups, which is his main complaint currently.   BACK PAIN; WOUND INFECTION    Transition of care performed with sharing of clinical summary  Nutrition, metabolism, and development symptoms  Sepsis  Bacteremia  HLD (hyperlipidemia)  HTN (hypertension)  CAD (coronary artery disease)  Constipation  Hiccups  Hyponatremia  ANJU (acute kidney injury)  Preoperative clearance  Wound infection      ROS:  CONSTITUTIONAL:  Negative fever or chills, feels well, good appetite  EYES:  Negative  blurry vision or double vision  CARDIOVASCULAR:  Negative for chest pain or palpitations  RESPIRATORY:  Negative for cough, wheezing, or SOB   GASTROINTESTINAL:  Negative for nausea, vomiting, diarrhea, constipation, or abdominal pain  GENITOURINARY:  Negative frequency, urgency or dysuria  NEUROLOGIC:  No headache, confusion, dizziness, lightheadedness    Allergies    No Known Allergies    Intolerances        ANTIBIOTICS/RELEVANT:  antimicrobials  nafcillin  IVPB      nafcillin  IVPB 2 Gram(s) IV Intermittent every 4 hours    immunologic:    OTHER:  acetaminophen   Tablet .. 650 milliGRAM(s) Oral every 6 hours PRN  ALBUTerol    90 MICROgram(s) HFA Inhaler 1 Puff(s) Inhalation every 4 hours  ALBUTerol/ipratropium for Nebulization 3 milliLiter(s) Nebulizer every 6 hours  atorvastatin 10 milliGRAM(s) Oral at bedtime  bisacodyl 5 milliGRAM(s) Oral every 12 hours  bisacodyl Suppository 10 milliGRAM(s) Rectal daily  docusate sodium 100 milliGRAM(s) Oral three times a day  famotidine    Tablet 20 milliGRAM(s) Oral daily  heparin  Injectable 5000 Unit(s) SubCutaneous every 8 hours  metoclopramide Injectable 10 milliGRAM(s) IV Push every 6 hours PRN  ondansetron Injectable 4 milliGRAM(s) IV Push every 6 hours PRN  senna 2 Tablet(s) Oral at bedtime  sodium chloride 0.9%. 1000 milliLiter(s) IV Continuous <Continuous>  tiotropium 18 MICROgram(s) Capsule 1 Capsule(s) Inhalation daily      Objective:  Vital Signs Last 24 Hrs  T(C): 37.3 (11 Oct 2018 19:19), Max: 37.3 (11 Oct 2018 19:19)  T(F): 99.2 (11 Oct 2018 19:19), Max: 99.2 (11 Oct 2018 19:19)  HR: 88 (11 Oct 2018 19:19) (76 - 94)  BP: 144/84 (11 Oct 2018 19:19) (120/71 - 150/75)  BP(mean): --  RR: 20 (11 Oct 2018 19:19) (18 - 20)  SpO2: 96% (11 Oct 2018 19:19) (96% - 99%)    PHYSICAL EXAM:  Constitutional: Well-developed, well nourished  Eyes:ALICIA, EOMI  Ear/Nose/Throat: no oral lesion, no sinus tenderness on percussion	  Culture - Blood (10.07.18 @ 14:59)    Specimen Source: .Blood Blood    Culture Results:   No growth at 4 days.    Neck:no JVD, no lymphadenopathy, supple  Respiratory: CTA gabby  Cardiovascular: S1S2 RRR, no murmurs  Gastrointestinal:soft, (+) BS, no HSM  Extremities:no e/e/c        LABS:                        12.8   8.6   )-----------( 273      ( 11 Oct 2018 06:25 )             36.5     10-11    142  |  105  |  25<H>  ----------------------------<  151<H>  4.3   |  24  |  1.93<H>    Ca    8.6      11 Oct 2018 12:49  Phos  3.0     10-11  Mg     2.4     10-11              MICROBIOLOGY:    Culture - Blood (10.07.18 @ 14:59)    Specimen Source: .Blood Blood    Culture Results:   No growth at 4 days.              RADIOLOGY & ADDITIONAL STUDIES:    < from: NM Inflammatory Loc Wholebody, Gallium (10.11.18 @ 11:00) >  Impression: Postsurgical changes in the upper lumbar region. No evidence   of distant infection is identified. Specifically, no evidence of an   aortic rootabscess is seen.    < end of copied text >

## 2018-10-11 NOTE — PROGRESS NOTE ADULT - PROBLEM SELECTOR PLAN 7
resolved   will continue to monitor resolved   will continue to monitor  encouraged patient to use ICS and mobilize from the bed   as per wife patient cannot be on any medications causing drowsiness, trialed on thorazine and benzos for hiccups without any help and made him very sedated will continue to monitor  encouraged patient to use ICS and mobilize from the bed   as per wife patient cannot be on any medications causing drowsiness, trialed on thorazine and benzos for hiccups without any help and made him very sedated unclear reason for hiccups but could be 2/2 right hemiphragm elevation   will continue to monitor  encouraged patient to use ICS and mobilize from the bed   as per wife patient cannot be on any medications causing drowsiness, trialed on thorazine and benzos for hiccups without any help and made him very sedated

## 2018-10-11 NOTE — PROGRESS NOTE ADULT - SUBJECTIVE AND OBJECTIVE BOX
Transfer of care to [facility]   HPI: This is a 84 y/o male with PMH CAD, HLD, HTN s/p lumbar laminectomy on 9/21/18 who was doing well until admitted on 10/4/18 for right flank pain and RLQ abdominal found found to have wound dehisence s/p lumbar wound washout with positive cultures for MSSA and also found to be MSSA bacteremia. Patient underwent JENIFER which was remarkable for aortic dilatation that cannot r/o abscess. Patient pending gallium scan for further evaluation of the aorta to r/o abscess. Patient has been afebrile for >48 hours and surveillance cultures no growth at 4 days. White count significantly trended down. Has been on nafcillin for 7 days to be completed for 6-8 week course pending PICC line. Hospital course also complicated of ANJU 2/2 sepsis which has been trending back to normal with nephrology following.     OVERNIGHT EVENTS: HAL     SUBJECTIVE:    Vital Signs Last 12 Hrs  T(F): 97.9 (10-11-18 @ 05:28), Max: 97.9 (10-11-18 @ 05:28)  HR: 94 (10-11-18 @ 15:52) (76 - 94)  BP: 120/71 (10-11-18 @ 05:28) (120/71 - 120/71)  RR: 18 (10-11-18 @ 05:28) (18 - 18)  SpO2: 98% (10-11-18 @ 15:52) (97% - 98%)  I&O's Summary    10 Oct 2018 07:01  -  11 Oct 2018 07:00  --------------------------------------------------------  IN: 1500 mL / OUT: 1150 mL / NET: 350 mL    11 Oct 2018 07:01  -  11 Oct 2018 16:30  --------------------------------------------------------  IN: 0 mL / OUT: 100 mL / NET: -100 mL        PHYSICAL EXAM:  Constitutional: NAD, comfortable in bed.  HEENT: NC/AT, PERRLA, EOMI, no conjunctival pallor or scleral icterus, MMM  Neck: Supple, no JVD  Respiratory: Normal rate, rhythm, depth, effort. CTAB. No w/r/r.   Cardiovascular: RRR, normal S1 and S2, no m/r/g.   Gastrointestinal: +BS, soft NTND, no guarding or rebound tenderness, no palpable masses   Extremities: wwp; no cyanosis, clubbing or edema.   Vascular: Pulses equal and strong throughout.   Neurological: AAOx3, no CN deficits, strength and sensation intact throughout.   Skin: No gross skin abnormalities or rashes        LABS:                        12.8   8.6   )-----------( 273      ( 11 Oct 2018 06:25 )             36.5     10-11    142  |  105  |  25<H>  ----------------------------<  151<H>  4.3   |  24  |  1.93<H>    Ca    8.6      11 Oct 2018 12:49  Phos  3.0     10-11  Mg     2.4     10-11            RADIOLOGY & ADDITIONAL TESTS:    MEDICATIONS  (STANDING):  ALBUTerol    90 MICROgram(s) HFA Inhaler 1 Puff(s) Inhalation every 4 hours  ALBUTerol/ipratropium for Nebulization 3 milliLiter(s) Nebulizer every 6 hours  atorvastatin 10 milliGRAM(s) Oral at bedtime  bisacodyl 5 milliGRAM(s) Oral every 12 hours  bisacodyl Suppository 10 milliGRAM(s) Rectal daily  docusate sodium 100 milliGRAM(s) Oral three times a day  famotidine    Tablet 20 milliGRAM(s) Oral daily  heparin  Injectable 5000 Unit(s) SubCutaneous every 8 hours  nafcillin  IVPB      nafcillin  IVPB 2 Gram(s) IV Intermittent every 4 hours  senna 2 Tablet(s) Oral at bedtime  sodium chloride 0.9%. 1000 milliLiter(s) (80 mL/Hr) IV Continuous <Continuous>  tiotropium 18 MICROgram(s) Capsule 1 Capsule(s) Inhalation daily    MEDICATIONS  (PRN):  acetaminophen   Tablet .. 650 milliGRAM(s) Oral every 6 hours PRN Temp greater or equal to 38C (100.4F), Mild Pain (1 - 3)  metoclopramide Injectable 10 milliGRAM(s) IV Push every 6 hours PRN Hiccups  ondansetron Injectable 4 milliGRAM(s) IV Push every 6 hours PRN Nausea and/or Vomiting Transfer of care to [facility]   HPI: This is a 84 y/o male with PMH CAD s/p stent (currently on aspirin), HLD, HTN s/p lumbar laminectomy on 9/21/18 who was doing well until admitted on 10/4/18 for right flank pain and RLQ abdominal found to have wound dehiscence s/p lumbar wound washout with positive cultures for MSSA and also found to be MSSA bacteremia. Patient underwent JENIFER which was remarkable for aortic dilatation that cannot r/o abscess. Patient pending gallium scan for further evaluation of the aorta to r/o abscess. Patient has been afebrile for >48 hours and surveillance cultures no growth at 4 days. White count significantly trended down. Has been on nafcillin for 7 days to be completed for 6-8 week course pending PICC line. Hospital course also complicated of ANJU 2/2 sepsis which has been trending back to normal with nephrology following.     OVERNIGHT EVENTS: HAL     SUBJECTIVE:    Vital Signs Last 12 Hrs  T(F): 97.9 (10-11-18 @ 05:28), Max: 97.9 (10-11-18 @ 05:28)  HR: 94 (10-11-18 @ 15:52) (76 - 94)  BP: 120/71 (10-11-18 @ 05:28) (120/71 - 120/71)  RR: 18 (10-11-18 @ 05:28) (18 - 18)  SpO2: 98% (10-11-18 @ 15:52) (97% - 98%)  I&O's Summary    10 Oct 2018 07:01  -  11 Oct 2018 07:00  --------------------------------------------------------  IN: 1500 mL / OUT: 1150 mL / NET: 350 mL    11 Oct 2018 07:01  -  11 Oct 2018 16:30  --------------------------------------------------------  IN: 0 mL / OUT: 100 mL / NET: -100 mL        PHYSICAL EXAM:  Constitutional: NAD, comfortable in bed.  HEENT: NC/AT, PERRLA, EOMI, no conjunctival pallor or scleral icterus, MMM  Neck: Supple, no JVD  Respiratory: Normal rate, rhythm, depth, effort. CTAB. No w/r/r.   Cardiovascular: RRR, normal S1 and S2, no m/r/g.   Gastrointestinal: +BS, soft NTND, no guarding or rebound tenderness, no palpable masses   Extremities: wwp; no cyanosis, clubbing or edema.   Vascular: Pulses equal and strong throughout.   Neurological: AAOx3, no CN deficits, strength and sensation intact throughout.   Skin: No gross skin abnormalities or rashes        LABS:                        12.8   8.6   )-----------( 273      ( 11 Oct 2018 06:25 )             36.5     10-11    142  |  105  |  25<H>  ----------------------------<  151<H>  4.3   |  24  |  1.93<H>    Ca    8.6      11 Oct 2018 12:49  Phos  3.0     10-11  Mg     2.4     10-11            RADIOLOGY & ADDITIONAL TESTS:    MEDICATIONS  (STANDING):  ALBUTerol    90 MICROgram(s) HFA Inhaler 1 Puff(s) Inhalation every 4 hours  ALBUTerol/ipratropium for Nebulization 3 milliLiter(s) Nebulizer every 6 hours  atorvastatin 10 milliGRAM(s) Oral at bedtime  bisacodyl 5 milliGRAM(s) Oral every 12 hours  bisacodyl Suppository 10 milliGRAM(s) Rectal daily  docusate sodium 100 milliGRAM(s) Oral three times a day  famotidine    Tablet 20 milliGRAM(s) Oral daily  heparin  Injectable 5000 Unit(s) SubCutaneous every 8 hours  nafcillin  IVPB      nafcillin  IVPB 2 Gram(s) IV Intermittent every 4 hours  senna 2 Tablet(s) Oral at bedtime  sodium chloride 0.9%. 1000 milliLiter(s) (80 mL/Hr) IV Continuous <Continuous>  tiotropium 18 MICROgram(s) Capsule 1 Capsule(s) Inhalation daily    MEDICATIONS  (PRN):  acetaminophen   Tablet .. 650 milliGRAM(s) Oral every 6 hours PRN Temp greater or equal to 38C (100.4F), Mild Pain (1 - 3)  metoclopramide Injectable 10 milliGRAM(s) IV Push every 6 hours PRN Hiccups  ondansetron Injectable 4 milliGRAM(s) IV Push every 6 hours PRN Nausea and/or Vomiting Transfer of care to [facility]   HPI: This is a 82 y/o male with PMH CAD s/p stent (currently on aspirin), HLD, HTN s/p lumbar laminectomy on 9/21/18 who was doing well until admitted on 10/4/18 for right flank pain and RLQ abdominal found to have wound dehiscence s/p lumbar wound washout with positive cultures for MSSA and also found to be MSSA bacteremia. Patient underwent JENIFER which was remarkable for aortic dilatation that cannot r/o abscess. Patient pending gallium scan for further evaluation of the aorta to r/o abscess. Patient has been afebrile for >48 hours and surveillance cultures no growth at 4 days. White count significantly trended down. Has been on nafcillin for 7 days to be completed for 6-8 week course pending PICC line. Hospital course also complicated of ANJU 2/2 sepsis which has been trending back to normal with nephrology following.     OVERNIGHT EVENTS: HAL     SUBJECTIVE:    Vital Signs Last 12 Hrs  T(F): 97.9 (10-11-18 @ 05:28), Max: 97.9 (10-11-18 @ 05:28)  HR: 94 (10-11-18 @ 15:52) (76 - 94)  BP: 120/71 (10-11-18 @ 05:28) (120/71 - 120/71)  RR: 18 (10-11-18 @ 05:28) (18 - 18)  SpO2: 98% (10-11-18 @ 15:52) (97% - 98%)  I&O's Summary    10 Oct 2018 07:01  -  11 Oct 2018 07:00  --------------------------------------------------------  IN: 1500 mL / OUT: 1150 mL / NET: 350 mL    11 Oct 2018 07:01  -  11 Oct 2018 16:30  --------------------------------------------------------  IN: 0 mL / OUT: 100 mL / NET: -100 mL        PHYSICAL EXAM:  Constitutional: NAD, comfortable in bed.  HEENT: NC/AT, PERRLA, EOMI, no conjunctival pallor or scleral icterus, MMM  Neck: Supple, no JVD  Respiratory: Normal rate, rhythm, depth, effort. Diminished breath sounds b/l lower bases. No crackles/rhonchi or rales   Cardiovascular: RRR, normal S1 and S2, no m/r/g.   Gastrointestinal: +BS, soft NTND, no guarding or rebound tenderness, no palpable masses   Extremities: wwp; no cyanosis, clubbing or edema.   Vascular: Pulses equal and strong throughout.   Neurological: AAOx3, no CN deficits, strength and sensation intact throughout.   Skin: No gross skin abnormalities or rashes        LABS:                        12.8   8.6   )-----------( 273      ( 11 Oct 2018 06:25 )             36.5     10-11    142  |  105  |  25<H>  ----------------------------<  151<H>  4.3   |  24  |  1.93<H>    Ca    8.6      11 Oct 2018 12:49  Phos  3.0     10-11  Mg     2.4     10-11            RADIOLOGY & ADDITIONAL TESTS:    MEDICATIONS  (STANDING):  ALBUTerol    90 MICROgram(s) HFA Inhaler 1 Puff(s) Inhalation every 4 hours  ALBUTerol/ipratropium for Nebulization 3 milliLiter(s) Nebulizer every 6 hours  atorvastatin 10 milliGRAM(s) Oral at bedtime  bisacodyl 5 milliGRAM(s) Oral every 12 hours  bisacodyl Suppository 10 milliGRAM(s) Rectal daily  docusate sodium 100 milliGRAM(s) Oral three times a day  famotidine    Tablet 20 milliGRAM(s) Oral daily  heparin  Injectable 5000 Unit(s) SubCutaneous every 8 hours  nafcillin  IVPB      nafcillin  IVPB 2 Gram(s) IV Intermittent every 4 hours  senna 2 Tablet(s) Oral at bedtime  sodium chloride 0.9%. 1000 milliLiter(s) (80 mL/Hr) IV Continuous <Continuous>  tiotropium 18 MICROgram(s) Capsule 1 Capsule(s) Inhalation daily    MEDICATIONS  (PRN):  acetaminophen   Tablet .. 650 milliGRAM(s) Oral every 6 hours PRN Temp greater or equal to 38C (100.4F), Mild Pain (1 - 3)  metoclopramide Injectable 10 milliGRAM(s) IV Push every 6 hours PRN Hiccups  ondansetron Injectable 4 milliGRAM(s) IV Push every 6 hours PRN Nausea and/or Vomiting Transfer of care to [facility]   HPI: This is a 82 y/o male with PMH CAD s/p stent (currently on aspirin), HLD, HTN s/p lumbar laminectomy on 9/21/18 who was doing well until admitted on 10/4/18 for right flank pain and RLQ abdominal found to have wound dehiscence s/p lumbar wound washout with positive cultures for MSSA and also found to be MSSA bacteremia. Patient underwent JENIFER which was remarkable for aortic dilatation that cannot r/o abscess. Patient pending gallium scan for further evaluation of the aorta to r/o abscess. Patient has been afebrile for >48 hours and surveillance cultures no growth at 4 days. White count significantly trended down. Has been on nafcillin for 7 days to be completed for 6-8 week course pending PICC line. Hospital course also complicated of ANJU 2/2 sepsis which has been trending back to normal with nephrology following.     OVERNIGHT EVENTS: HAL     SUBJECTIVE: Patient still has hiccups but does not have any shortness of breath, chest tightness or pain. He is tolerating diet. No n/v or diarrhea. He reports he was trialed on medications for the hiccups but they only made him feel drowsy without any relief.     Vital Signs Last 12 Hrs  T(F): 97.9 (10-11-18 @ 05:28), Max: 97.9 (10-11-18 @ 05:28)  HR: 94 (10-11-18 @ 15:52) (76 - 94)  BP: 120/71 (10-11-18 @ 05:28) (120/71 - 120/71)  RR: 18 (10-11-18 @ 05:28) (18 - 18)  SpO2: 98% (10-11-18 @ 15:52) (97% - 98%)  I&O's Summary    10 Oct 2018 07:01  -  11 Oct 2018 07:00  --------------------------------------------------------  IN: 1500 mL / OUT: 1150 mL / NET: 350 mL    11 Oct 2018 07:01  -  11 Oct 2018 16:30  --------------------------------------------------------  IN: 0 mL / OUT: 100 mL / NET: -100 mL        PHYSICAL EXAM:  Constitutional: obese male in NAD, comfortable in bed.  HEENT: NC/AT, PERRLA, EOMI, no conjunctival pallor or scleral icterus, MMM  Neck: Supple, no JVD  Respiratory: Normal rate, rhythm, depth, effort. Diminished breath sounds b/l lower bases. No crackles/rhonchi or rales   Cardiovascular: RRR, normal S1 and S2, no m/r/g.   Gastrointestinal: +BS, soft NTND, no guarding or rebound tenderness, no palpable masses   Extremities: wwp; no cyanosis, clubbing or edema.   Vascular: Pulses equal and strong throughout.   Neurological: AAOx3, no CN deficits, strength and sensation intact throughout.   Skin: Stitches along the back spanning 2 inches. Area is clean and dry no erythematous or swelling. Some mild palpation but no palpable abscess/fluctuance. +PICC line on the right upper arm that is clean dry and intact without any redness/swelling at the site.         LABS:                        12.8   8.6   )-----------( 273      ( 11 Oct 2018 06:25 )             36.5     10-11    142  |  105  |  25<H>  ----------------------------<  151<H>  4.3   |  24  |  1.93<H>    Ca    8.6      11 Oct 2018 12:49  Phos  3.0     10-11  Mg     2.4     10-11        RADIOLOGY & ADDITIONAL TESTS:  < from: NM Inflammatory Loc Wholebody, Gallium (10.11.18 @ 11:00) >  INTERPRETATION:    INFLAMMATION IMAGING - WHOLE BODY FOLLOWED BY SPECT-CT IMAGING OF THE   CHEST    Indication: The patient is an 83-year-old man who had a wound infection   in the lumbar region. Echocardiogram suggested the possibility of an   aortic root abscess.    Procedure: The patient received an intravenous injection of 6 mCi of   gallium-67 citrate on 10/10/2018 and images of the whole body from the   top of the head to the ankles were obtained at about 24 hours.    Findings: Increased activity is seen at the site of the patient's surgery   that appears to be in the upper lumbar region. Activity is seen in the   right side of the chest and in the midline anteriorly in the chest.    To resolve the location of these various areas of increased activity and   to evaluate the aortic root, SPECT-CT imaging of the chest was performed.    Activity in the right side of the thorax is due to bowel under the   patient's elevated right hemidiaphragm and no abnormal gallium activity   is seen in the lungs. In the region of the aortic root, no abnormal   gallium uptake is seen.    Activity in the upper part of the sternum anteriorly is normal, activity   on the whole body imaging being likely the result of uptake near the chin   being projected over the upper chest because of severe scoliosis.    The upper end of the patient's surgical site is visible on this study and   shows the expected activity in the surgical scar.    Impression: Postsurgical changes in the upper lumbar region. No evidence   of distant infection is identified. Specifically, no evidence of an   aortic rootabscess is seen.      < end of copied text >        MEDICATIONS  (STANDING):  ALBUTerol    90 MICROgram(s) HFA Inhaler 1 Puff(s) Inhalation every 4 hours  ALBUTerol/ipratropium for Nebulization 3 milliLiter(s) Nebulizer every 6 hours  atorvastatin 10 milliGRAM(s) Oral at bedtime  bisacodyl 5 milliGRAM(s) Oral every 12 hours  bisacodyl Suppository 10 milliGRAM(s) Rectal daily  docusate sodium 100 milliGRAM(s) Oral three times a day  famotidine    Tablet 20 milliGRAM(s) Oral daily  heparin  Injectable 5000 Unit(s) SubCutaneous every 8 hours  nafcillin  IVPB      nafcillin  IVPB 2 Gram(s) IV Intermittent every 4 hours  senna 2 Tablet(s) Oral at bedtime  sodium chloride 0.9%. 1000 milliLiter(s) (80 mL/Hr) IV Continuous <Continuous>  tiotropium 18 MICROgram(s) Capsule 1 Capsule(s) Inhalation daily    MEDICATIONS  (PRN):  acetaminophen   Tablet .. 650 milliGRAM(s) Oral every 6 hours PRN Temp greater or equal to 38C (100.4F), Mild Pain (1 - 3)  metoclopramide Injectable 10 milliGRAM(s) IV Push every 6 hours PRN Hiccups  ondansetron Injectable 4 milliGRAM(s) IV Push every 6 hours PRN Nausea and/or Vomiting

## 2018-10-11 NOTE — PROGRESS NOTE ADULT - SUBJECTIVE AND OBJECTIVE BOX
seen in the morning, no complains, making urine     Renal service for ANJU - got contrast on 10/4     Patient seen and examined at bedside.         Physical exam;   Alert and oriented   No JVD   Normal air entry into the lungs, no wheezing, noc rackles   RRR, normal s1/s2, no murmurs, rubs or gallops   Abdomen - soft, not tender, not distended,   extremities, no edema           LABS:                        12.8   7.6   )-----------( 260      ( 10 Oct 2018 06:53 )             36.5     10-10    139  |  105  |  31<H>  ----------------------------<  98  see note   |  20<L>  |  2.13<H>    Ca    7.7<L>      10 Oct 2018 06:53  Phos  4.0     10-09  Mg     2.5     10-09                  RADIOLOGY & ADDITIONAL STUDIES: 84 y/o M with PMHx of CAD s/p stents, HTN, s/p lumbar laminectomy 9/21 (L2-L3, L3-L4, L4-L5) admitted for skin and soft tissue infection s/p I&D on 10/5 with wound culture growing MSSA, also with MSSA bacteremia x 3 days now with cultures from 10/7 showing NGTD while on nafcillin 2g IV q4h.     JENIFER negative for valvular disease, but aortic root thickening noted which was read as "cannot rule out aortic root abscess" with clinical correlation advised. Patient is being evaluated by Infectious Disease service (Dr. Adams) and plan is for gallium scan today to evaluate the collection.   Patient was seen and examined at bedside.  He had no new complaints.      Meds:    acetaminophen   Tablet .. 650 milliGRAM(s) Oral every 6 hours PRN  ALBUTerol    90 MICROgram(s) HFA Inhaler 1 Puff(s) Inhalation every 4 hours  ALBUTerol/ipratropium for Nebulization 3 milliLiter(s) Nebulizer every 6 hours  atorvastatin 10 milliGRAM(s) Oral at bedtime  bisacodyl 5 milliGRAM(s) Oral every 12 hours  bisacodyl Suppository 10 milliGRAM(s) Rectal daily  docusate sodium 100 milliGRAM(s) Oral three times a day  famotidine    Tablet 20 milliGRAM(s) Oral daily  heparin  Injectable 5000 Unit(s) SubCutaneous every 8 hours  metoclopramide Injectable 10 milliGRAM(s) IV Push every 6 hours PRN  metoclopramide Injectable 10 milliGRAM(s) IV Push once  nafcillin  IVPB      nafcillin  IVPB 2 Gram(s) IV Intermittent every 4 hours  ondansetron Injectable 4 milliGRAM(s) IV Push every 6 hours PRN  senna 2 Tablet(s) Oral at bedtime  sodium chloride 0.9%. 1000 milliLiter(s) IV Continuous <Continuous>  tiotropium 18 MICROgram(s) Capsule 1 Capsule(s) Inhalation daily      T(C): 36.6 (10-11-18 @ 05:28), Max: 36.8 (10-10-18 @ 22:02)  HR: 76 (10-11-18 @ 05:28) (76 - 97)  BP: 120/71 (10-11-18 @ 05:28) (120/71 - 140/74)  RR: 18 (10-11-18 @ 05:28) (17 - 18)  SpO2: 97% (10-11-18 @ 05:28) (97% - 99%)    Input/Output      10-10-18 @ 07:01  -  10-11-18 @ 07:00  --------------------------------------------------------  IN: 1500 mL / OUT: 1150 mL / NET: 350 mL    10-11-18 @ 07:01  -  10-11-18 @ 14:29  --------------------------------------------------------  IN: 0 mL / OUT: 100 mL / NET: -100 mL             PHYSICAL EXAM   General: NAD, comfortable, AOx3  HEENT: NCAT, PERRL, clear conjunctiva, no scleral icterus  Neck: supple, no JVD  Respiratory: CTA b/l, no wheezing, rhonchi, rales  Cardiovascular: RRR, normal S1S2, no M/R/G  Abdomen: soft, NT/ND, bowel sounds normoactive throughout, no palpable masses  Extremities: WWP, no clubbing, cyanosis, or edema          LABS:                                               12.8   8.6   )-----------( 273      ( 11 Oct 2018 06:25 )             36.5       10-11    142  |  105  |  25<H>  ----------------------------<  151<H>  4.3   |  24  |  1.93<H>    Ca    8.6      11 Oct 2018 12:49  Phos  3.0     10-11  Mg     2.4     10-11

## 2018-10-11 NOTE — PROGRESS NOTE ADULT - PROBLEM SELECTOR PLAN 8
resolved   patient with current sodium 142  initially believed to be 2/2 hypovolemic hyponatremia in setting of sepsis

## 2018-10-11 NOTE — CONSULT NOTE ADULT - ASSESSMENT
82 y/o M with PMHx of CAD s/p stents, HTN, s/p lumbar laminectomy 9/21 (L2-L3, L3-L4, L4-L5) with uncomplicated hospital course, now with skin and soft tissue infection s/p I&D on 10/5 with wound culture growing MSSA, also with MSSA bacteremia x 3 days now with cultures from 10/7 showing NGTD while on nafcillin 2g IV q4h. JENIFER negative for valvular disease, but aortic root thickening noted which was read as "cannot rule out aortic root abscess" with clinical correlation advised. Patient is being evaluated by Infectious Disease service (Dr. Adams) and plan is for gallium scan to evaluate the collection. Cardiology was consulted for the potential abscess.    *Consult in progress. Recommendations to follow.

## 2018-10-11 NOTE — PROGRESS NOTE ADULT - PROBLEM SELECTOR PLAN 1
Patient presented with severe sepsis 2/2 skin infection s/p I&D and wound wash out with cultures +MSSA and blood cultures +MSSA   s/p 1 week nafcillin to continue for 6-8 weeks   needs PICC line  to continue antibiotics prior to discharge Patient presented with severe sepsis 2/2 skin infection s/p I&D and wound wash out with cultures +MSSA and blood cultures +MSSA   s/p 1 week nafcillin to continue for 6-8 weeks   needs PICC line  to continue antibiotics prior to discharge  JENIFER negative for vegatation on valve but showed possible aortic abscess and patient underwent gallium scan today which showed no abscess   patient has been afebrile >48 hours and vitals have been stable   WBC has greatly improved from 22 (10/4) to 8 today (10/11)

## 2018-10-11 NOTE — PROGRESS NOTE ADULT - ASSESSMENT
82 yo M with HTN, CAD s/p stents s/p lumbar laminectomy 9/21 now with skin and soft tissue infection s/p I&D on 10/5 with wound culture growing MSSA, also with MSSA bacteremia X3 days now with cultures from 10/7 showing NGTD. JENIFER done, though no valvular disease noted, aortic root thickening noted which was read as "cannot rule out aortic root abscess" with clinical correlation advised. Gallium scan done to evaluate, negative for aortic root abscess.      Recommendations are as follows:    1. No evidence of aortic root abscess on gallium scan   2. C/w nafcillin 2 g IV q4h- PICC for long term abx as OP  3. BC 10/7 NGTD    ID will continue to follow with you.

## 2018-10-11 NOTE — PROGRESS NOTE ADULT - SUBJECTIVE AND OBJECTIVE BOX
HPI:  82yo left handed male with PMH CAD HDL HTN s/p 9/21/2018 Lumbar lami and discharge with home care. Pt doing well until yesterday noted pain right flank and RLQ pain. Distal end of wound noted to be dehiscence and purulent drainage. Pt chills, afebrile. Denies urinary or bowel dysfunction. (04 Oct 2018 14:18)    OVERNIGHT EVENTS:  Vital Signs Last 24 Hrs  T(C): 36.6 (11 Oct 2018 05:28), Max: 36.8 (10 Oct 2018 22:02)  T(F): 97.9 (11 Oct 2018 05:28), Max: 98.3 (10 Oct 2018 22:02)  HR: 76 (11 Oct 2018 05:28) (76 - 97)  BP: 120/71 (11 Oct 2018 05:28) (120/71 - 157/81)  BP(mean): --  RR: 18 (11 Oct 2018 05:28) (17 - 18)  SpO2: 97% (11 Oct 2018 05:28) (97% - 99%)    I&O's Summary    10 Oct 2018 07:01  -  11 Oct 2018 07:00  --------------------------------------------------------  IN: 1500 mL / OUT: 1150 mL / NET: 350 mL    11 Oct 2018 07:01  -  11 Oct 2018 09:59  --------------------------------------------------------  IN: 0 mL / OUT: 100 mL / NET: -100 mL        PHYSICAL EXAM:  Neurological:  A&OX3 Cranial nerves intact  RODRIGUEZ   Lami incision clean and dry    Cardiovascular:RRR  Respiratory: Lungs CTAB  Gastrointestinal:+BS  +BM  Genitourinary:Voiding without difficulty  Extremities: warm and dry  Incision/Wound: CDI    DIET: Regular    LABS:                        12.8   8.6   )-----------( 273      ( 11 Oct 2018 06:25 )             36.5     10-11    139  |  102  |  26<H>  ----------------------------<  120<H>  4.0   |  21<L>  |  1.99<H>    Ca    8.4      11 Oct 2018 06:25  Phos  3.0     10-11  Mg     2.4     10-11      CAPILLARY BLOOD GLUCOSE      Drug Levels: [] N/A    CSF Analysis: [] N/A      Allergies    No Known Allergies    Intolerances      MEDICATIONS:  Antibiotics:  nafcillin  IVPB      nafcillin  IVPB 2 Gram(s) IV Intermittent every 4 hours    Neuro:  acetaminophen   Tablet .. 650 milliGRAM(s) Oral every 6 hours PRN  metoclopramide Injectable 10 milliGRAM(s) IV Push every 6 hours PRN  ondansetron Injectable 4 milliGRAM(s) IV Push every 6 hours PRN    Anticoagulation:  heparin  Injectable 5000 Unit(s) SubCutaneous every 8 hours    OTHER:  ALBUTerol    90 MICROgram(s) HFA Inhaler 1 Puff(s) Inhalation every 4 hours  ALBUTerol/ipratropium for Nebulization 3 milliLiter(s) Nebulizer every 6 hours  atorvastatin 10 milliGRAM(s) Oral at bedtime  bisacodyl 5 milliGRAM(s) Oral every 12 hours  bisacodyl Suppository 10 milliGRAM(s) Rectal daily  docusate sodium 100 milliGRAM(s) Oral three times a day  famotidine    Tablet 20 milliGRAM(s) Oral daily  senna 2 Tablet(s) Oral at bedtime  tiotropium 18 MICROgram(s) Capsule 1 Capsule(s) Inhalation daily    IVF:  sodium chloride 2 Gram(s) Oral every 6 hours    CULTURES:  Culture Results:   No growth at 3 days. (10-07 @ 14:59)  Culture Results:   No growth at 3 days. (10-07 @ 14:59)    RADIOLOGY & ADDITIONAL TESTS:      ASSESSMENT:  83y Male s/p  · Operative Findings	dehiscence to posterior pole of incision. superficial and subfascial layers with very minimal clear yellow drainage. No overt fluid collection or abscess identified.	      PLAN:    NEURO:    Monitor neuro status  OT/PT  Pain Managment  Bowel regime  Continue IV antibx as per ID  F/U Thallium scan  PICC once ID clears pt  Continue current medical regime    Dispo: Will discuss with attending

## 2018-10-11 NOTE — PROGRESS NOTE ADULT - PROBLEM SELECTOR PLAN 1
- non -oliguric ANJU   - improving of the renal function   - most likley from the contrast and hypotension   - volume status acceptable   - electrolytes noted   - can stop the iv fluids   - encourage oral intake   - no need for eosinophils in the urine nad C3 and c4   - in and outs   - daily weight   - renal diet - non -oliguric ANJU   - with improving renal function - Cr 1.9 today  - most likely due to contrast exposure vs and hypotension/hypoperfusion  - volume status acceptable   - electrolytes noted   - encourage oral intake   - in and outs   - daily weight   - renal diet  - gentle hydration as needed  - avoid nephrotoxic agents - non -oliguric ANJU   - with improving renal function - Cr 1.9 today  - most likely due to contrast exposure vs and hypotension/hypoperfusion  - volume status acceptable   - electrolytes noted   - encourage oral intake   - in and outs   - daily weight   - renal diet  - gentle hydration as needed - NS 50 ml/hr  - avoid nephrotoxic agents

## 2018-10-11 NOTE — PROGRESS NOTE ADULT - ASSESSMENT
The patient with non oliguric lisa - most likley fron JAIMIE .  Alternatively, patient may have chronic renal insufficiency 2/2 HTN.  Endocarditis to be ruled out with scans and JENIFER and blood cultures. 84 y/o M with PMHx of CAD s/p stents, HTN, s/p lumbar laminectomy 9/21 (L2-L3, L3-L4, L4-L5) with uncomplicated hospital course, now with skin and soft tissue infection s/p I&D on 10/5 with wound culture growing MSSA, also with MSSA bacteremia x 3 days now with cultures from 10/7 showing NGTD while on nafcillin 2g IV q4h. JENIFER negative for valvular disease, but aortic root thickening noted which was read as "cannot rule out aortic root abscess" with clinical correlation advised. Patient is being evaluated by Infectious Disease service (Dr. Adams) and plan is for gallium scan to evaluate the collection. Cardiology was consulted for the potential abscess.    Renal consult was called for non oliguric lisa - most likely from JAIMIE .    Alternatively, patient may have chronic renal insufficiency 2/2 HTN.

## 2018-10-11 NOTE — PROGRESS NOTE ADULT - PROBLEM SELECTOR PLAN 3
s/p wound washout of lumbar spine with cx positive for MSSA   c/w nafcillin treatment for 6-8 weeks; currently received 1 week of treatment   patient afebrile >48 hours   will continue to monitor

## 2018-10-11 NOTE — CONSULT NOTE ADULT - SUBJECTIVE AND OBJECTIVE BOX
CARDIOLOGY CONSULT NOTE    HPI:  82 y/o M with PMHx of CAD s/p stents, HTN, s/p lumbar laminectomy 9/21 (L2-L3, L3-L4, L4-L5) with uncomplicated hospital course, now with skin and soft tissue infection s/p I&D on 10/5 with wound culture growing MSSA, also with MSSA bacteremia x 3 days now with cultures from 10/7 showing NGTD while on nafcillin 2g IV q4h. JENIFER negative for valvular disease, but aortic root thickening noted which was read as "cannot rule out aortic root abscess" with clinical correlation advised. Patient is being evaluated by Infectious Disease service (Dr. Adams) and plan is for gallium scan to evaluate the collection.    Initially underwent lumbar laminectomy on 9/21, with drains removed POD2 without complications. He noted clear drainage from his post-op wound for which his wife called his doctor who told them it was normal. Drainage has persisted and increased in quantity to the point where he is soaking through dressings/clothing. On the morning of 10/3, patient woke up with new onset right flank pain radiating to RLQ as well as new onset bilateral lower extremity weakness, R>L. He states he went to bed feeling well and woke up in the middle of the night with hematuria (bright red blood) which has since resolved. He also reports some urinary incontinence, states he had small volume of urine produced though he was unaware it was coming out, which is unusual for him. No dysuria, frequency, urgency otherwise. States he has had constipation, no diarrhea, cough, SOB, headaches. No trauma to his back, no quick or abnormal movements or lifting of heavy boxes. Denies paresthesias, saddle anesthesia, fecal incontinence. Reports chills.     Today,     PMHx:  PAST MEDICAL & SURGICAL HISTORY:  Asthma  BPH (benign prostatic hyperplasia)  Chronic bilateral low back pain with bilateral sciatica  Claudication  HLD (hyperlipidemia)  CAD (coronary artery disease): coronory stents x 2  HTN (hypertension)  H/O laminectomy  Status post lumbar laminectomy  History of appendectomy  History of partial thyroidectomy  History of percutaneous transluminal coronary angioplasty    Current Medications:  MEDICATIONS  (STANDING):  ALBUTerol    90 MICROgram(s) HFA Inhaler 1 Puff(s) Inhalation every 4 hours  ALBUTerol/ipratropium for Nebulization 3 milliLiter(s) Nebulizer every 6 hours  atorvastatin 10 milliGRAM(s) Oral at bedtime  bisacodyl 5 milliGRAM(s) Oral every 12 hours  bisacodyl Suppository 10 milliGRAM(s) Rectal daily  docusate sodium 100 milliGRAM(s) Oral three times a day  famotidine    Tablet 20 milliGRAM(s) Oral daily  heparin  Injectable 5000 Unit(s) SubCutaneous every 8 hours  metoclopramide Injectable 10 milliGRAM(s) IV Push once  nafcillin  IVPB      nafcillin  IVPB 2 Gram(s) IV Intermittent every 4 hours  senna 2 Tablet(s) Oral at bedtime  sodium chloride 0.9%. 1000 milliLiter(s) (80 mL/Hr) IV Continuous <Continuous>  tiotropium 18 MICROgram(s) Capsule 1 Capsule(s) Inhalation daily    MEDICATIONS  (PRN):  acetaminophen   Tablet .. 650 milliGRAM(s) Oral every 6 hours PRN Temp greater or equal to 38C (100.4F), Mild Pain (1 - 3)  metoclopramide Injectable 10 milliGRAM(s) IV Push every 6 hours PRN Hiccups  ondansetron Injectable 4 milliGRAM(s) IV Push every 6 hours PRN Nausea and/or Vomiting    Allergies:  No Known Allergies    Social history:  Tobacco  EtOH  Drugs  Social life    Family history:      VITAL SIGNS:  ICU Vital Signs Last 24 Hrs  T(C): 36.6 (11 Oct 2018 05:28), Max: 36.8 (10 Oct 2018 22:02)  T(F): 97.9 (11 Oct 2018 05:28), Max: 98.3 (10 Oct 2018 22:02)  HR: 76 (11 Oct 2018 05:28) (76 - 97)  BP: 120/71 (11 Oct 2018 05:28) (120/71 - 140/74)  BP(mean): --  ABP: --  ABP(mean): --  RR: 18 (11 Oct 2018 05:28) (17 - 18)  SpO2: 97% (11 Oct 2018 05:28) (97% - 99%)      10-10-18 @ 07:01  -  10-11-18 @ 07:00  --------------------------------------------------------  IN: 1500 mL / OUT: 1150 mL / NET: 350 mL    10-11-18 @ 07:01  -  10-11-18 @ 14:05  --------------------------------------------------------  IN: 0 mL / OUT: 100 mL / NET: -100 mL      PHYSICAL EXAM   General: NAD, comfortable, AOx3  HEENT: NCAT, PERRL, clear conjunctiva, no scleral icterus  Neck: supple, no JVD  Respiratory: CTA b/l, no wheezing, rhonchi, rales  Cardiovascular: RRR, normal S1S2, no M/R/G  Abdomen: soft, NT/ND, bowel sounds normoactive throughout, no palpable masses  Extremities: WWP, no clubbing, cyanosis, or edema  Neuro:    LABS                        12.8   8.6   )-----------( 273      ( 11 Oct 2018 06:25 )             36.5     10-11    142  |  105  |  25<H>  ----------------------------<  151<H>  4.3   |  24  |  1.93<H>    Ca    8.6      11 Oct 2018 12:49  Phos  3.0     10-11  Mg     2.4     10-11    IMAGING  CXR -   EKG - CARDIOLOGY CONSULT NOTE    HPI:  82 y/o M with PMHx of CAD s/p stents, HTN, s/p lumbar laminectomy 9/21 (L2-L3, L3-L4, L4-L5) with uncomplicated hospital course, now with skin and soft tissue infection s/p I&D on 10/5 with wound culture growing MSSA, also with MSSA bacteremia x 3 days now with cultures from 10/7 showing NGTD while on nafcillin 2g IV q4h. JENIFER negative for valvular disease, but aortic root thickening noted which was read as "cannot rule out aortic root abscess" with clinical correlation advised. Patient is being evaluated by Infectious Disease service (Dr. Adams) and plan is for gallium scan to evaluate the collection.    Initially underwent lumbar laminectomy on 9/21, with drains removed POD2 without complications. He noted clear drainage from his post-op wound for which his wife called his doctor who told them it was normal. Drainage has persisted and increased in quantity to the point where he is soaking through dressings/clothing. On the morning of 10/3, patient woke up with new onset right flank pain radiating to RLQ as well as new onset bilateral lower extremity weakness, R>L. He states he went to bed feeling well and woke up in the middle of the night with hematuria (bright red blood) which has since resolved. He also reports some urinary incontinence, states he had small volume of urine produced though he was unaware it was coming out, which is unusual for him. No dysuria, frequency, urgency otherwise. States he has had constipation, no diarrhea, cough, SOB, headaches. No trauma to his back, no quick or abnormal movements or lifting of heavy boxes. Denies paresthesias, saddle anesthesia, fecal incontinence. Reports chills.     Today,       Prior cardiac testing:  TTE 10/4: EF 55-60%, severe concentric LVH, LVWM normal, normal AV, mild AR, normal MV. Mild TR.   JENIFER 10/9: no interarterial shunt, AV trileaflet, mild AR, mild MR, mild TR, LV 55-60%, mild atherosclerotic plaque in aortic arch. No veg on valves. Cannot rule out aortic root abscess.    PMHx:  PAST MEDICAL & SURGICAL HISTORY:  Asthma  BPH (benign prostatic hyperplasia)  Chronic bilateral low back pain with bilateral sciatica  Claudication  HLD (hyperlipidemia)  CAD (coronary artery disease): coronory stents x 2  HTN (hypertension)  H/O laminectomy  Status post lumbar laminectomy  History of appendectomy  History of partial thyroidectomy  History of percutaneous transluminal coronary angioplasty    Current Medications:  MEDICATIONS  (STANDING):  ALBUTerol    90 MICROgram(s) HFA Inhaler 1 Puff(s) Inhalation every 4 hours  ALBUTerol/ipratropium for Nebulization 3 milliLiter(s) Nebulizer every 6 hours  atorvastatin 10 milliGRAM(s) Oral at bedtime  bisacodyl 5 milliGRAM(s) Oral every 12 hours  bisacodyl Suppository 10 milliGRAM(s) Rectal daily  docusate sodium 100 milliGRAM(s) Oral three times a day  famotidine    Tablet 20 milliGRAM(s) Oral daily  heparin  Injectable 5000 Unit(s) SubCutaneous every 8 hours  metoclopramide Injectable 10 milliGRAM(s) IV Push once  nafcillin  IVPB      nafcillin  IVPB 2 Gram(s) IV Intermittent every 4 hours  senna 2 Tablet(s) Oral at bedtime  sodium chloride 0.9%. 1000 milliLiter(s) (80 mL/Hr) IV Continuous <Continuous>  tiotropium 18 MICROgram(s) Capsule 1 Capsule(s) Inhalation daily    MEDICATIONS  (PRN):  acetaminophen   Tablet .. 650 milliGRAM(s) Oral every 6 hours PRN Temp greater or equal to 38C (100.4F), Mild Pain (1 - 3)  metoclopramide Injectable 10 milliGRAM(s) IV Push every 6 hours PRN Hiccups  ondansetron Injectable 4 milliGRAM(s) IV Push every 6 hours PRN Nausea and/or Vomiting    Allergies:  No Known Allergies    Social history:  Tobacco  EtOH  Drugs  Social life    Family history:      VITAL SIGNS:  ICU Vital Signs Last 24 Hrs  T(C): 36.6 (11 Oct 2018 05:28), Max: 36.8 (10 Oct 2018 22:02)  T(F): 97.9 (11 Oct 2018 05:28), Max: 98.3 (10 Oct 2018 22:02)  HR: 76 (11 Oct 2018 05:28) (76 - 97)  BP: 120/71 (11 Oct 2018 05:28) (120/71 - 140/74)  BP(mean): --  ABP: --  ABP(mean): --  RR: 18 (11 Oct 2018 05:28) (17 - 18)  SpO2: 97% (11 Oct 2018 05:28) (97% - 99%)      10-10-18 @ 07:01  -  10-11-18 @ 07:00  --------------------------------------------------------  IN: 1500 mL / OUT: 1150 mL / NET: 350 mL    10-11-18 @ 07:01  -  10-11-18 @ 14:05  --------------------------------------------------------  IN: 0 mL / OUT: 100 mL / NET: -100 mL      PHYSICAL EXAM   General: NAD, comfortable, AOx3  HEENT: NCAT, PERRL, clear conjunctiva, no scleral icterus  Neck: supple, no JVD  Respiratory: CTA b/l, no wheezing, rhonchi, rales  Cardiovascular: RRR, normal S1S2, no M/R/G  Abdomen: soft, NT/ND, bowel sounds normoactive throughout, no palpable masses  Extremities: WWP, no clubbing, cyanosis, or edema  Neuro:    LABS                        12.8   8.6   )-----------( 273      ( 11 Oct 2018 06:25 )             36.5     10-11    142  |  105  |  25<H>  ----------------------------<  151<H>  4.3   |  24  |  1.93<H>    Ca    8.6      11 Oct 2018 12:49  Phos  3.0     10-11  Mg     2.4     10-11    IMAGING  CXR -   EKG -

## 2018-10-11 NOTE — PROGRESS NOTE ADULT - PROBLEM SELECTOR PLAN 4
Cr increased on IV fluids and most likely 2/2 hypovolemia in the setting of sepsis  Renal consult and ordered renal US which showed no hydro   Creat today is 1.93 down from 2.70  Unclear of baseline creatinine   will continue to trend creatinine and continue on IVF
The patient is hemodynamically stable.  The pain is controlled.  Patient is on DVT prophylaxis.  Patient is using incentive spirometry.  Oxygen saturation is acceptable.  Advance diet as tolerated.  Advance activity as tolerated.  Monitor for ileus.  Patient is on Laxatives.  Surgical wound is stable.  indication for monitor bed.
continue reglan, may try thorazine today as still having hiccups
The patient is hemodynamically stable.  The pain is controlled.  Patient is on DVT prophylaxis.  Patient is using incentive spirometry.  Oxygen saturation is acceptable.  Advance diet as tolerated.  Advance activity as tolerated.  Monitor for ileus.  Patient is on Laxatives.  Surgical wound is stable.  indication for monitor bed.

## 2018-10-11 NOTE — PROGRESS NOTE ADULT - PROBLEM SELECTOR PLAN 6
c/w statin
to follow as outpatient.  IS and OOB
continue HTN and HLD meds
to follow as outpatient.  IS and OOB

## 2018-10-11 NOTE — PROGRESS NOTE ADULT - PROBLEM SELECTOR PLAN 10
Case discussed with Dr. Barker who is accepting the patient to his service
Resolved and you to the one infection am bacteremia. Patient was admitted with sepsis from day one of his admission although the patient did not fulfill the complete criteria for subs

## 2018-10-12 VITALS
SYSTOLIC BLOOD PRESSURE: 134 MMHG | OXYGEN SATURATION: 97 % | DIASTOLIC BLOOD PRESSURE: 85 MMHG | TEMPERATURE: 97 F | RESPIRATION RATE: 17 BRPM | HEART RATE: 86 BPM

## 2018-10-12 PROBLEM — J45.909 UNSPECIFIED ASTHMA, UNCOMPLICATED: Chronic | Status: ACTIVE | Noted: 2018-10-04

## 2018-10-12 PROBLEM — N40.0 BENIGN PROSTATIC HYPERPLASIA WITHOUT LOWER URINARY TRACT SYMPTOMS: Chronic | Status: ACTIVE | Noted: 2018-10-04

## 2018-10-12 LAB
ANION GAP SERPL CALC-SCNC: 14 MMOL/L — SIGNIFICANT CHANGE UP (ref 5–17)
BUN SERPL-MCNC: 22 MG/DL — SIGNIFICANT CHANGE UP (ref 7–23)
CALCIUM SERPL-MCNC: 8.2 MG/DL — LOW (ref 8.4–10.5)
CHLORIDE SERPL-SCNC: 105 MMOL/L — SIGNIFICANT CHANGE UP (ref 96–108)
CO2 SERPL-SCNC: 23 MMOL/L — SIGNIFICANT CHANGE UP (ref 22–31)
CREAT SERPL-MCNC: 1.84 MG/DL — HIGH (ref 0.5–1.3)
CRP SERPL-MCNC: 5.86 MG/DL — HIGH (ref 0–0.4)
CULTURE RESULTS: SIGNIFICANT CHANGE UP
CULTURE RESULTS: SIGNIFICANT CHANGE UP
ERYTHROCYTE [SEDIMENTATION RATE] IN BLOOD: 47 MM/HR — HIGH
GLUCOSE SERPL-MCNC: 106 MG/DL — HIGH (ref 70–99)
HCT VFR BLD CALC: 33.8 % — LOW (ref 39–50)
HGB BLD-MCNC: 11.7 G/DL — LOW (ref 13–17)
MCHC RBC-ENTMCNC: 31 PG — SIGNIFICANT CHANGE UP (ref 27–34)
MCHC RBC-ENTMCNC: 34.6 G/DL — SIGNIFICANT CHANGE UP (ref 32–36)
MCV RBC AUTO: 89.4 FL — SIGNIFICANT CHANGE UP (ref 80–100)
OSMOLALITY UR: 413 MOSMOL/KG — SIGNIFICANT CHANGE UP (ref 100–650)
PLATELET # BLD AUTO: 309 K/UL — SIGNIFICANT CHANGE UP (ref 150–400)
POTASSIUM SERPL-MCNC: 3.7 MMOL/L — SIGNIFICANT CHANGE UP (ref 3.5–5.3)
POTASSIUM SERPL-SCNC: 3.7 MMOL/L — SIGNIFICANT CHANGE UP (ref 3.5–5.3)
RBC # BLD: 3.78 M/UL — LOW (ref 4.2–5.8)
RBC # FLD: 15.2 % — SIGNIFICANT CHANGE UP (ref 10.3–16.9)
SODIUM SERPL-SCNC: 142 MMOL/L — SIGNIFICANT CHANGE UP (ref 135–145)
SODIUM UR-SCNC: 78 MMOL/L — SIGNIFICANT CHANGE UP
SPECIMEN SOURCE: SIGNIFICANT CHANGE UP
SPECIMEN SOURCE: SIGNIFICANT CHANGE UP
UUN UR-MCNC: 461 MG/DL — SIGNIFICANT CHANGE UP
WBC # BLD: 8.9 K/UL — SIGNIFICANT CHANGE UP (ref 3.8–10.5)
WBC # FLD AUTO: 8.9 K/UL — SIGNIFICANT CHANGE UP (ref 3.8–10.5)

## 2018-10-12 PROCEDURE — 71045 X-RAY EXAM CHEST 1 VIEW: CPT | Mod: 26

## 2018-10-12 PROCEDURE — 99238 HOSP IP/OBS DSCHRG MGMT 30/<: CPT

## 2018-10-12 PROCEDURE — 99232 SBSQ HOSP IP/OBS MODERATE 35: CPT | Mod: GC

## 2018-10-12 RX ORDER — OLMESARTAN MEDOXOMIL 5 MG/1
1 TABLET, FILM COATED ORAL
Qty: 0 | Refills: 0 | COMMUNITY

## 2018-10-12 RX ORDER — POTASSIUM CHLORIDE 20 MEQ
20 PACKET (EA) ORAL ONCE
Qty: 0 | Refills: 0 | Status: COMPLETED | OUTPATIENT
Start: 2018-10-12 | End: 2018-10-12

## 2018-10-12 RX ORDER — CHLORHEXIDINE GLUCONATE 213 G/1000ML
1 SOLUTION TOPICAL DAILY
Qty: 0 | Refills: 0 | Status: DISCONTINUED | OUTPATIENT
Start: 2018-10-12 | End: 2018-10-12

## 2018-10-12 RX ADMIN — NAFCILLIN 200 GRAM(S): 10 INJECTION, POWDER, FOR SOLUTION INTRAVENOUS at 06:00

## 2018-10-12 RX ADMIN — CHLORHEXIDINE GLUCONATE 1 APPLICATION(S): 213 SOLUTION TOPICAL at 11:57

## 2018-10-12 RX ADMIN — SODIUM CHLORIDE 100 MILLILITER(S): 9 INJECTION INTRAMUSCULAR; INTRAVENOUS; SUBCUTANEOUS at 07:45

## 2018-10-12 RX ADMIN — Medication 20 MILLIEQUIVALENT(S): at 11:57

## 2018-10-12 RX ADMIN — NAFCILLIN 200 GRAM(S): 10 INJECTION, POWDER, FOR SOLUTION INTRAVENOUS at 13:01

## 2018-10-12 RX ADMIN — NAFCILLIN 200 GRAM(S): 10 INJECTION, POWDER, FOR SOLUTION INTRAVENOUS at 01:11

## 2018-10-12 RX ADMIN — NAFCILLIN 200 GRAM(S): 10 INJECTION, POWDER, FOR SOLUTION INTRAVENOUS at 09:50

## 2018-10-12 NOTE — PROGRESS NOTE ADULT - PROBLEM SELECTOR PROBLEM 2
Bacteremia
HTN (hypertension)
ANJU (acute kidney injury)
Pure hypercholesterolemia

## 2018-10-12 NOTE — PROGRESS NOTE ADULT - ASSESSMENT
82 y/o M with PMHx of CAD s/p stents, HTN, s/p lumbar laminectomy 9/21 (L2-L3, L3-L4, L4-L5) with uncomplicated hospital course, now with skin and soft tissue infection s/p I&D on 10/5 with wound culture growing MSSA, also with MSSA bacteremia x 3 days now with cultures from 10/7 showing NGTD while on nafcillin 2g IV q4h. JENIFER negative for valvular disease, but aortic root thickening noted which was read as "cannot rule out aortic root abscess" with clinical correlation advised. Patient is being evaluated by Infectious Disease service (Dr. Adams) and plan is for gallium scan to evaluate the collection. Cardiology was consulted for the potential abscess.    Renal consult was called for non oliguric lisa - most likely from JAIMIE .    Alternatively, patient may have chronic renal insufficiency 2/2 HTN.

## 2018-10-12 NOTE — PROGRESS NOTE ADULT - PROBLEM SELECTOR PROBLEM 1
Sepsis
ANJU (acute kidney injury)
ANJU (acute kidney injury)
ANUJ (acute kidney injury)
Wound infection
Essential hypertension

## 2018-10-12 NOTE — PROGRESS NOTE ADULT - ASSESSMENT
84 yo M with HTN, CAD s/p stents s/p lumbar laminectomy 9/21 now with skin and soft tissue infection s/p I&D on 10/5 with wound culture growing MSSA, also with MSSA bacteremia X3 days now with cultures from 10/7 showing NGTD. JENIFER done, though no valvular disease noted, aortic root thickening noted which was read as "cannot rule out aortic root abscess" with clinical correlation advised. Gallium scan done to evaluate, negative for aortic root abscess.      Recommendations are as follows:    1. No evidence of aortic root abscess on gallium scan   2. C/w nafcillin 2 g IV q4h, 6 weeks from first negative BC 10/7- tentative end date 11/18  3. For discharge, can f/u with Dr. Adams. Her office will call to schedule apt with patient. Please send weekly CBC, CMP, ESR, CRP to her office for monitoring while on IV abx.     ID will sign off at this time. Please re-consult with any further questions.

## 2018-10-12 NOTE — PROGRESS NOTE ADULT - SUBJECTIVE AND OBJECTIVE BOX
ID FOLLOW UP NOTE    INTERVAL HPI: Patient feeling well this AM. Gallium scan negative yesterday. Discharged later in evening.     BACK PAIN; WOUND INFECTION    Transition of care performed with sharing of clinical summary  Nutrition, metabolism, and development symptoms  Sepsis  Bacteremia  HLD (hyperlipidemia)  HTN (hypertension)  CAD (coronary artery disease)  Constipation  Hiccups  Hyponatremia  ANJU (acute kidney injury)  Preoperative clearance  Wound infection      ROS:  CONSTITUTIONAL:  Negative fever or chills, feels well, good appetite  EYES:  Negative  blurry vision or double vision  CARDIOVASCULAR:  Negative for chest pain or palpitations  RESPIRATORY:  Negative for cough, wheezing, or SOB   GASTROINTESTINAL:  Negative for nausea, vomiting, diarrhea, constipation, or abdominal pain  GENITOURINARY:  Negative frequency, urgency or dysuria  NEUROLOGIC:  No headache, confusion, dizziness, lightheadedness    Allergies    No Known Allergies    Intolerances        ANTIBIOTICS/RELEVANT:  antimicrobials  nafcillin  IVPB      nafcillin  IVPB 2 Gram(s) IV Intermittent every 4 hours    immunologic:    OTHER:  acetaminophen   Tablet .. 650 milliGRAM(s) Oral every 6 hours PRN  ALBUTerol    90 MICROgram(s) HFA Inhaler 1 Puff(s) Inhalation every 4 hours  ALBUTerol/ipratropium for Nebulization 3 milliLiter(s) Nebulizer every 6 hours  atorvastatin 10 milliGRAM(s) Oral at bedtime  bisacodyl 5 milliGRAM(s) Oral every 12 hours  bisacodyl Suppository 10 milliGRAM(s) Rectal daily  chlorhexidine 2% Cloths 1 Application(s) Topical daily  docusate sodium 100 milliGRAM(s) Oral three times a day  famotidine    Tablet 20 milliGRAM(s) Oral daily  heparin  Injectable 5000 Unit(s) SubCutaneous every 8 hours  metoclopramide Injectable 10 milliGRAM(s) IV Push every 6 hours PRN  ondansetron Injectable 4 milliGRAM(s) IV Push every 6 hours PRN  senna 2 Tablet(s) Oral at bedtime  sodium chloride 0.9%. 1000 milliLiter(s) IV Continuous <Continuous>  tiotropium 18 MICROgram(s) Capsule 1 Capsule(s) Inhalation daily      Objective:  Vital Signs Last 24 Hrs  T(C): 36.1 (12 Oct 2018 09:09), Max: 37.7 (11 Oct 2018 21:06)  T(F): 97 (12 Oct 2018 09:09), Max: 99.8 (11 Oct 2018 21:06)  HR: 86 (12 Oct 2018 09:09) (60 - 92)  BP: 134/85 (12 Oct 2018 09:09) (127/69 - 154/87)  BP(mean): --  RR: 17 (12 Oct 2018 09:09) (17 - 18)  SpO2: 97% (12 Oct 2018 09:09) (95% - 97%)    PHYSICAL EXAM:  Constitutional: Well-developed, well nourished  Eyes:ALICIA, EOMI  Ear/Nose/Throat: no oral lesion, no sinus tenderness on percussion	  Neck:no JVD, no lymphadenopathy, supple  Respiratory: CTA gabby  Cardiovascular: S1S2 RRR, no murmurs  Gastrointestinal:soft, (+) BS, no HSM  Extremities:no e/e/c        LABS:                        11.7   8.9   )-----------( 309      ( 12 Oct 2018 06:18 )             33.8     10-12    142  |  105  |  22  ----------------------------<  106<H>  3.7   |  23  |  1.84<H>    Ca    8.2<L>      12 Oct 2018 06:16  Phos  3.0     10-11  Mg     2.4     10-11              MICROBIOLOGY:  Culture - Blood (10.07.18 @ 14:59)    Specimen Source: .Blood Blood    Culture Results:   No growth at 5 days.          RADIOLOGY & ADDITIONAL STUDIES: Gallium as noted.

## 2018-10-12 NOTE — PROGRESS NOTE ADULT - PROVIDER SPECIALTY LIST ADULT
Infectious Disease
Internal Medicine
Nephrology
Neurosurgery
Cardiology
Neurosurgery
Internal Medicine

## 2018-10-12 NOTE — PROGRESS NOTE ADULT - SUBJECTIVE AND OBJECTIVE BOX
82 y/o M with PMHx of CAD s/p stents, HTN, s/p lumbar laminectomy 9/21 (L2-L3, L3-L4, L4-L5) admitted for skin and soft tissue infection s/p I&D on 10/5 with wound culture growing MSSA, also with MSSA bacteremia x 3 days now with cultures from 10/7 showing NGTD while on nafcillin 2g IV q4h. JENIFER negative for valvular disease, but aortic root thickening noted which was read as "cannot rule out aortic root abscess" with clinical correlation advised.     Renal Consult was called for ANJU. Patient is nonoliguric with improving renal function - Cr 1.8 today.    Patient was seen and examined at bedside.  He had no new complaints.        Meds:    acetaminophen   Tablet .. 650 milliGRAM(s) Oral every 6 hours PRN  ALBUTerol    90 MICROgram(s) HFA Inhaler 1 Puff(s) Inhalation every 4 hours  ALBUTerol/ipratropium for Nebulization 3 milliLiter(s) Nebulizer every 6 hours  atorvastatin 10 milliGRAM(s) Oral at bedtime  bisacodyl 5 milliGRAM(s) Oral every 12 hours  bisacodyl Suppository 10 milliGRAM(s) Rectal daily  chlorhexidine 2% Cloths 1 Application(s) Topical daily  docusate sodium 100 milliGRAM(s) Oral three times a day  famotidine    Tablet 20 milliGRAM(s) Oral daily  heparin  Injectable 5000 Unit(s) SubCutaneous every 8 hours  metoclopramide Injectable 10 milliGRAM(s) IV Push every 6 hours PRN  nafcillin  IVPB      nafcillin  IVPB 2 Gram(s) IV Intermittent every 4 hours  ondansetron Injectable 4 milliGRAM(s) IV Push every 6 hours PRN  potassium chloride    Tablet ER 20 milliEquivalent(s) Oral once  senna 2 Tablet(s) Oral at bedtime  sodium chloride 0.9%. 1000 milliLiter(s) IV Continuous <Continuous>  tiotropium 18 MICROgram(s) Capsule 1 Capsule(s) Inhalation daily      T(C): 36.1 (10-12-18 @ 09:09), Max: 37.7 (10-11-18 @ 21:06)  HR: 86 (10-12-18 @ 09:09) (60 - 94)  BP: 134/85 (10-12-18 @ 09:09) (127/69 - 154/87)  RR: 17 (10-12-18 @ 09:09) (17 - 20)  SpO2: 97% (10-12-18 @ 09:09) (95% - 98%)    Input/Output      10-11-18 @ 07:01  -  10-12-18 @ 07:00  --------------------------------------------------------  IN: 2280 mL / OUT: 900 mL / NET: 1380 mL    10-12-18 @ 07:01  -  10-12-18 @ 10:30  --------------------------------------------------------  IN: 500 mL / OUT: 0 mL / NET: 500 mL           PHYSICAL EXAM   General: NAD, comfortable, AOx3  HEENT: NCAT, PERRL, clear conjunctiva, no scleral icterus  Neck: supple, no JVD  Respiratory: CTA b/l, no wheezing, rhonchi, rales  Cardiovascular: RRR, normal S1S2, no M/R/G  Abdomen: soft, NT/ND, bowel sounds normoactive throughout, no palpable masses  Extremities: WWP, no clubbing, cyanosis, or edema          LABS:                                     11.7   8.9   )-----------( 309      ( 12 Oct 2018 06:18 )             33.8       10-12    142  |  105  |  22  ----------------------------<  106<H>  3.7   |  23  |  1.84<H>    Ca    8.2<L>      12 Oct 2018 06:16  Phos  3.0     10-11  Mg     2.4     10-11

## 2018-10-12 NOTE — PROGRESS NOTE ADULT - ATTENDING COMMENTS
I have reviewed the medical record, including laboratory and radiographic studies, interviewed and examined the patient and discussed the plan with Dr. Menchaca, the ID Resident.  Agree with above. Discussed with Dr. Badillo (ECHO) - thickening is usually seen in people who have undergone surgery and he has not.  Finding can be c/c aortic root abscess.  Unclear how long he was bacteremic, though he now appears to have cleared.  Though preferred next diagnostic test would be CT angio, he is first starting to recover from significant ANJU.  Discussed with Dr. Norwood (NM) - recommend gallium scan for further evaluation.  Will continue to follow with you – ID service.
I have reviewed the medical record, including laboratory and radiographic studies, interviewed and examined the patient and discussed the plan with Dr. Menchaca, the ID Resident.  Agree with above. Discussed with Dr. Norwood (NM) - no uptake in aortic root at 24 h, no need for f/u imaging at 48 h.  Will continue to follow with you – ID service.
I have reviewed the medical record, including laboratory and radiographic studies, interviewed and examined the patient and discussed the plan with Dr. Menchaca, the ID Resident.  Agree with above. If OR cultures grow only MSSA, will plan for course of nafcillin alone.  Will continue to follow with you – ID service.
I have reviewed the medical record, including laboratory and radiographic studies, interviewed and examined the patient and discussed the plan with Dr. Menchaca, the ID Resident.  Agree with above. Please recall if further ID input is desired – ID service.
I have reviewed the medical record, including laboratory and radiographic studies, interviewed and examined the patient and discussed the plan with Dr. Menchaca, the ID Resident.  Agree with above. He has no rash, no eosinophils or heme in urine and ANJU occurred after only one day on nafcillin.  For reasons stated above, would rather treat with nafcillin unless there is strong evidence to support AIN.  Will continue to follow with you – ID service.
I have reviewed the medical record, including laboratory and radiographic studies, interviewed and examined the patient and discussed the plan with Dr. Menchaca, the ID Resident.  Agree with above. Will continue to follow with you – ID service.
Renal function is improving and patient is scheduled to be discharged today.  I agree with Dr. Kaur's note above.  Creatinine now is 1.8 mg/dl.  Markedly improved.
Patient with ANJU likely 2/2 contrast, hypotension or endocarditis.  Please add tests for complement C3,C4, and CH50 to exclude immune mediated disease.  Blood cultures to rule out bacteremia.  Follow BUN/Creatinine to determine whether renal function is improving.  Discussed with Dr. Barrera.
84 y/o M with PMHx of CAD s/p stents, HTN, s/p lumbar laminectomy 9/21 (L2-L3, L3-L4, L4-L5) with uncomplicated hospital course, now with skin and soft tissue infection s/p I&D on 10/5 with wound culture growing MSSA, also with MSSA bacteremia x 3 days now with cultures from 10/7 showing NGTD while on nafcillin 2g IV q4h. JENIFER negative for valvular disease, but aortic root thickening noted which was read as "cannot rule out aortic root abscess" with clinical correlation advised. Patient is being evaluated by Infectious Disease service (Dr. Adams) and plan is for gallium scan to evaluate the collection. Cardiology was consulted for the potential abscess.    Renal consult was called for non oliguric lisa - most likely from JAIMIE .    Alternatively, patient may have chronic renal insufficiency 2/2 HTN.     Patient was injcted with gallium for a scan to rule out disseminated infection.
Patient seen and examined with house-staff during bedside rounds.  Resident note read, including vitals, physical findings, laboratory data, and radiological reports.   Revisions included below.  Direct personal management at bed side and extensive interpretation of the data.  Plan was outlined and discussed in details with the housestaff.  Decision making of high complexity  Action taken for acute disease activity to reflect the level of care provided:  - medication reconciliation  - review laboratory data  The patient was seen earlier during the day. The patient is clinically stable. The renal function improved. His hemoglobin is stable. Gallium scanner is positive at the incision site. Repeat blood cultures negative. I discussed the case with the infectious disease. PICC line and IV antibiotic at home for a total of six weeks.  The patient was admitted with bacteremia and one infection with sepsis. The patient might not have fulfilled all the sepsis criteria at the patient has sepsis bacteremia and one infection
Patient seen and examined with house-staff during bedside rounds.  Resident note read, including vitals, physical findings, laboratory data, and radiological reports.   Revisions included below.  Direct personal management at bed side and extensive interpretation of the data.  Plan was outlined and discussed in details with the housestaff.  Decision making of high complexity  Action taken for acute disease activity to reflect the level of care provided:  - medication reconciliation  - review laboratory data
Patient seen and examined with house-staff during bedside rounds.  Resident note read, including vitals, physical findings, laboratory data, and radiological reports.   Revisions included below.  Direct personal management at bed side and extensive interpretation of the data.  Plan was outlined and discussed in details with the housestaff.  Decision making of high complexity  Action taken for acute disease activity to reflect the level of care provided:  - medication reconciliation  - review laboratory data
Patient seen and examined with house-staff during bedside rounds.  Resident note read, including vitals, physical findings, laboratory data, and radiological reports.   Revisions included below.  Direct personal management at bed side and extensive interpretation of the data.  Plan was outlined and discussed in details with the housestaff.  Decision making of high complexity  Action taken for acute disease activity to reflect the level of care provided:  - medication reconciliation  - review laboratory data  discussed with Dr Diaz
Patient seen and examined with house-staff during bedside rounds.  Resident note read, including vitals, physical findings, laboratory data, and radiological reports.   Revisions included below.  Direct personal management at bed side and extensive interpretation of the data.  Plan was outlined and discussed in details with the housestaff.  Decision making of high complexity  Action taken for acute disease activity to reflect the level of care provided:  - medication reconciliation  - review laboratory data  discussed with Dr Diaz.  Transferred the patient to my service on the medicine

## 2018-10-12 NOTE — PROGRESS NOTE ADULT - PROBLEM SELECTOR PLAN 1
- non -oliguric ANJU   - with improving renal function - Cr 1.8 today  - most likely due to contrast exposure vs and hypotension/hypoperfusion  - volume status acceptable   - electrolytes noted   - encourage oral intake   - in and outs   - daily weight   - renal diet  - gentle hydration as needed - NS 50 ml/hr  - avoid nephrotoxic agents

## 2018-10-12 NOTE — PROGRESS NOTE ADULT - NSHPATTENDINGPLANDISCUSS_GEN_ALL_CORE
ns And the family and answered all the questions
Dr. Kaur
Dr. Barrera
Dr. Kaur
Dr. Barker
as above
ns
ns
ns And the family and answered all the questions, and ID

## 2018-10-19 ENCOUNTER — APPOINTMENT (OUTPATIENT)
Dept: INFECTIOUS DISEASE | Facility: CLINIC | Age: 83
End: 2018-10-19

## 2018-10-19 DIAGNOSIS — N40.0 BENIGN PROSTATIC HYPERPLASIA WITHOUT LOWER URINARY TRACT SYMPTOMS: ICD-10-CM

## 2018-10-19 DIAGNOSIS — E78.5 HYPERLIPIDEMIA, UNSPECIFIED: ICD-10-CM

## 2018-10-19 DIAGNOSIS — G89.29 OTHER CHRONIC PAIN: ICD-10-CM
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
## 2018-10-19 DIAGNOSIS — J45.909 UNSPECIFIED ASTHMA, UNCOMPLICATED: ICD-10-CM

## 2018-10-19 DIAGNOSIS — N17.9 ACUTE KIDNEY FAILURE, UNSPECIFIED: ICD-10-CM

## 2018-10-19 DIAGNOSIS — M96.842 POSTPROCEDURAL SEROMA OF A MUSCULOSKELETAL STRUCTURE FOLLOWING A MUSCULOSKELETAL SYSTEM PROCEDURE: ICD-10-CM

## 2018-10-19 DIAGNOSIS — A41.01 SEPSIS DUE TO METHICILLIN SUSCEPTIBLE STAPHYLOCOCCUS AUREUS: ICD-10-CM

## 2018-10-19 DIAGNOSIS — J98.6 DISORDERS OF DIAPHRAGM: ICD-10-CM

## 2018-10-19 DIAGNOSIS — R31.9 HEMATURIA, UNSPECIFIED: ICD-10-CM

## 2018-10-19 DIAGNOSIS — T81.44XA SEPSIS FOLLOWING A PROCEDURE, INITIAL ENCOUNTER: ICD-10-CM

## 2018-10-19 DIAGNOSIS — I10 ESSENTIAL (PRIMARY) HYPERTENSION: ICD-10-CM

## 2018-10-19 DIAGNOSIS — Z87.891 PERSONAL HISTORY OF NICOTINE DEPENDENCE: ICD-10-CM

## 2018-10-19 DIAGNOSIS — M54.41 LUMBAGO WITH SCIATICA, RIGHT SIDE: ICD-10-CM

## 2018-10-19 DIAGNOSIS — T81.31XA DISRUPTION OF EXTERNAL OPERATION (SURGICAL) WOUND, NOT ELSEWHERE CLASSIFIED, INITIAL ENCOUNTER: ICD-10-CM

## 2018-10-19 DIAGNOSIS — K21.9 GASTRO-ESOPHAGEAL REFLUX DISEASE WITHOUT ESOPHAGITIS: ICD-10-CM

## 2018-10-19 DIAGNOSIS — T81.42XA INFECTION FOLLOWING A PROCEDURE, DEEP INCISIONAL SURGICAL SITE, INITIAL ENCOUNTER: ICD-10-CM

## 2018-10-19 DIAGNOSIS — E89.0 POSTPROCEDURAL HYPOTHYROIDISM: ICD-10-CM

## 2018-10-19 DIAGNOSIS — R06.6 HICCOUGH: ICD-10-CM

## 2018-10-19 DIAGNOSIS — I25.10 ATHEROSCLEROTIC HEART DISEASE OF NATIVE CORONARY ARTERY WITHOUT ANGINA PECTORIS: ICD-10-CM

## 2018-10-19 DIAGNOSIS — M54.42 LUMBAGO WITH SCIATICA, LEFT SIDE: ICD-10-CM

## 2018-10-19 DIAGNOSIS — E87.1 HYPO-OSMOLALITY AND HYPONATREMIA: ICD-10-CM

## 2018-10-19 DIAGNOSIS — K59.00 CONSTIPATION, UNSPECIFIED: ICD-10-CM

## 2018-10-19 DIAGNOSIS — T50.8X5A ADVERSE EFFECT OF DIAGNOSTIC AGENTS, INITIAL ENCOUNTER: ICD-10-CM

## 2018-10-19 DIAGNOSIS — J98.11 ATELECTASIS: ICD-10-CM

## 2018-10-19 DIAGNOSIS — Z95.5 PRESENCE OF CORONARY ANGIOPLASTY IMPLANT AND GRAFT: ICD-10-CM

## 2018-10-19 DIAGNOSIS — Z86.73 PERSONAL HISTORY OF TRANSIENT ISCHEMIC ATTACK (TIA), AND CEREBRAL INFARCTION WITHOUT RESIDUAL DEFICITS: ICD-10-CM

## 2018-10-19 DIAGNOSIS — Z79.82 LONG TERM (CURRENT) USE OF ASPIRIN: ICD-10-CM
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
## 2018-10-19 DIAGNOSIS — R65.20 SEVERE SEPSIS WITHOUT SEPTIC SHOCK: ICD-10-CM

## 2018-11-03 LAB
CULTURE RESULTS: SIGNIFICANT CHANGE UP
SPECIMEN SOURCE: SIGNIFICANT CHANGE UP

## 2018-11-11 PROCEDURE — 78803 RP LOCLZJ TUM SPECT 1 AREA: CPT

## 2018-11-11 PROCEDURE — 36415 COLL VENOUS BLD VENIPUNCTURE: CPT

## 2018-11-11 PROCEDURE — 74176 CT ABD & PELVIS W/O CONTRAST: CPT

## 2018-11-11 PROCEDURE — 76937 US GUIDE VASCULAR ACCESS: CPT

## 2018-11-11 PROCEDURE — 72132 CT LUMBAR SPINE W/DYE: CPT

## 2018-11-11 PROCEDURE — 76775 US EXAM ABDO BACK WALL LIM: CPT

## 2018-11-11 PROCEDURE — 87086 URINE CULTURE/COLONY COUNT: CPT

## 2018-11-11 PROCEDURE — 97161 PT EVAL LOW COMPLEX 20 MIN: CPT

## 2018-11-11 PROCEDURE — 96375 TX/PRO/DX INJ NEW DRUG ADDON: CPT | Mod: XU

## 2018-11-11 PROCEDURE — 77001 FLUOROGUIDE FOR VEIN DEVICE: CPT

## 2018-11-11 PROCEDURE — 97530 THERAPEUTIC ACTIVITIES: CPT

## 2018-11-11 PROCEDURE — 85730 THROMBOPLASTIN TIME PARTIAL: CPT

## 2018-11-11 PROCEDURE — 87075 CULTR BACTERIA EXCEPT BLOOD: CPT

## 2018-11-11 PROCEDURE — 87150 DNA/RNA AMPLIFIED PROBE: CPT

## 2018-11-11 PROCEDURE — 99285 EMERGENCY DEPT VISIT HI MDM: CPT | Mod: 25

## 2018-11-11 PROCEDURE — 80053 COMPREHEN METABOLIC PANEL: CPT

## 2018-11-11 PROCEDURE — 93306 TTE W/DOPPLER COMPLETE: CPT

## 2018-11-11 PROCEDURE — 85025 COMPLETE CBC W/AUTO DIFF WBC: CPT

## 2018-11-11 PROCEDURE — 96361 HYDRATE IV INFUSION ADD-ON: CPT

## 2018-11-11 PROCEDURE — 87186 SC STD MICRODIL/AGAR DIL: CPT

## 2018-11-11 PROCEDURE — 83935 ASSAY OF URINE OSMOLALITY: CPT

## 2018-11-11 PROCEDURE — 96365 THER/PROPH/DIAG IV INF INIT: CPT | Mod: XU

## 2018-11-11 PROCEDURE — 94640 AIRWAY INHALATION TREATMENT: CPT

## 2018-11-11 PROCEDURE — A9556: CPT

## 2018-11-11 PROCEDURE — 80048 BASIC METABOLIC PNL TOTAL CA: CPT

## 2018-11-11 PROCEDURE — 81003 URINALYSIS AUTO W/O SCOPE: CPT

## 2018-11-11 PROCEDURE — 72158 MRI LUMBAR SPINE W/O & W/DYE: CPT

## 2018-11-11 PROCEDURE — 36569 INSJ PICC 5 YR+ W/O IMAGING: CPT

## 2018-11-11 PROCEDURE — 78802 RP LOCLZJ TUM WHBDY 1 D IMG: CPT

## 2018-11-11 PROCEDURE — 83735 ASSAY OF MAGNESIUM: CPT

## 2018-11-11 PROCEDURE — 71045 X-RAY EXAM CHEST 1 VIEW: CPT

## 2018-11-11 PROCEDURE — 87040 BLOOD CULTURE FOR BACTERIA: CPT

## 2018-11-11 PROCEDURE — 93005 ELECTROCARDIOGRAM TRACING: CPT

## 2018-11-11 PROCEDURE — 97116 GAIT TRAINING THERAPY: CPT

## 2018-11-11 PROCEDURE — 87070 CULTURE OTHR SPECIMN AEROBIC: CPT

## 2018-11-11 PROCEDURE — 84540 ASSAY OF URINE/UREA-N: CPT

## 2018-11-11 PROCEDURE — 85652 RBC SED RATE AUTOMATED: CPT

## 2018-11-11 PROCEDURE — 84300 ASSAY OF URINE SODIUM: CPT

## 2018-11-11 PROCEDURE — 84100 ASSAY OF PHOSPHORUS: CPT

## 2018-11-11 PROCEDURE — 86140 C-REACTIVE PROTEIN: CPT

## 2018-11-11 PROCEDURE — 83605 ASSAY OF LACTIC ACID: CPT

## 2018-11-11 PROCEDURE — C1751: CPT

## 2018-11-11 PROCEDURE — 97162 PT EVAL MOD COMPLEX 30 MIN: CPT

## 2018-11-11 PROCEDURE — 93312 ECHO TRANSESOPHAGEAL: CPT

## 2018-11-11 PROCEDURE — 85610 PROTHROMBIN TIME: CPT

## 2018-11-11 PROCEDURE — 87102 FUNGUS ISOLATION CULTURE: CPT

## 2018-11-11 PROCEDURE — 81001 URINALYSIS AUTO W/SCOPE: CPT

## 2018-11-11 PROCEDURE — 74018 RADEX ABDOMEN 1 VIEW: CPT

## 2018-11-11 PROCEDURE — A9585: CPT

## 2018-11-11 PROCEDURE — 93970 EXTREMITY STUDY: CPT

## 2018-11-11 PROCEDURE — 87205 SMEAR GRAM STAIN: CPT

## 2018-11-11 PROCEDURE — 85027 COMPLETE CBC AUTOMATED: CPT

## 2020-01-01 NOTE — OCCUPATIONAL THERAPY INITIAL EVALUATION ADULT - BED MOBILITY LIMITATIONS, REHAB EVAL
decreased ability to use legs for bridging/pushing/impaired ability to control trunk for mobility [phone]

## 2020-07-27 NOTE — ED ADULT NURSE NOTE - CAS TRG GENERAL NORM CIRC DET
Pharmacy faxed in a request for prior authorization on:    Medication: rosuvastatin   Dosage: 10 mg tablet   Quantity requested:  90   Pharmacy for prescription has been selected.    Prior authorization request has been initiated and sent for completion.   Strong peripheral pulses

## 2020-11-08 NOTE — OCCUPATIONAL THERAPY INITIAL EVALUATION ADULT - ANTICIPATED DISCHARGE DISPOSITION, OT EVAL
Patient arrives from being assessed at  for left sided throat pain starting yesterday. Patient did have a surgery Oct 27TH and was intubated at that time. Rates pain 4/10 sore at this time with pain increased with swallowing. Tolerating soft diet and fluids. Took Ibuprofen 800mg at noon.   
no needs

## 2022-08-03 NOTE — OCCUPATIONAL THERAPY INITIAL EVALUATION ADULT - PHYSICAL ASSIST/NONPHYSICAL ASSIST:DRESS LOWER BODY, OT EVAL
Show Applicator Variable?: Yes Consent: The patient's consent was obtained including but not limited to risks of crusting, scabbing, blistering, scarring, darker or lighter pigmentary change, recurrence, incomplete removal and infection. Render Post-Care Instructions In Note?: no Post-Care Instructions: I reviewed with the patient in detail post-care instructions. Patient is to wear sunprotection, and avoid picking at any of the treated lesions. Pt may apply Vaseline to crusted or scabbing areas. Duration Of Freeze Thaw-Cycle (Seconds): 0 Detail Level: Detailed 1 person assist

## 2022-11-25 NOTE — PATIENT PROFILE ADULT. - PSH
History of appendectomy    History of partial thyroidectomy    History of percutaneous transluminal coronary angioplasty
CONSTITUTIONAL: +low grade temp    HEENT: +cough    RESPIRATORY: Denies SOB    CARDIOVASCULAR: Denies CP    GASTROINTESTINAL: +vomiting.  Denies abdominal pain, nausea, diarrhea    SKIN: Denies rash

## 2023-10-19 NOTE — ED ADULT NURSE NOTE - EXTENSIONS OF SELF_ADULT
Pt resting on cart on left side with eyes closed. Steady and nonlabored respirations noted. Pt appears in no distress. Sitter at bedside within arms reach of pt.    None